# Patient Record
Sex: MALE | Race: WHITE | Employment: UNEMPLOYED | URBAN - METROPOLITAN AREA
[De-identification: names, ages, dates, MRNs, and addresses within clinical notes are randomized per-mention and may not be internally consistent; named-entity substitution may affect disease eponyms.]

---

## 2021-01-31 ENCOUNTER — HOSPITAL ENCOUNTER (EMERGENCY)
Age: 31
Discharge: HOME | End: 2021-01-31
Attending: EMERGENCY MEDICINE

## 2021-01-31 VITALS
RESPIRATION RATE: 16 BRPM | OXYGEN SATURATION: 97 % | SYSTOLIC BLOOD PRESSURE: 138 MMHG | HEART RATE: 86 BPM | TEMPERATURE: 97 F | DIASTOLIC BLOOD PRESSURE: 93 MMHG | WEIGHT: 172.1 LBS

## 2021-01-31 DIAGNOSIS — S51.812A LACERATION OF LEFT FOREARM, INITIAL ENCOUNTER: Primary | ICD-10-CM

## 2021-01-31 PROCEDURE — 12002 RPR S/N/AX/GEN/TRNK2.6-7.5CM: CPT

## 2021-01-31 PROCEDURE — 6370000000 HC RX 637 (ALT 250 FOR IP): Performed by: EMERGENCY MEDICINE

## 2021-01-31 PROCEDURE — 99284 EMERGENCY DEPT VISIT MOD MDM: CPT

## 2021-01-31 RX ORDER — IBUPROFEN 200 MG
TABLET ORAL
Qty: 1 TUBE | Refills: 1 | Status: SHIPPED | OUTPATIENT
Start: 2021-01-31

## 2021-01-31 RX ORDER — IBUPROFEN 600 MG/1
600 TABLET ORAL ONCE
Status: COMPLETED | OUTPATIENT
Start: 2021-01-31 | End: 2021-01-31

## 2021-01-31 RX ADMIN — Medication 3 ML: at 19:50

## 2021-01-31 RX ADMIN — IBUPROFEN 600 MG: 600 TABLET, FILM COATED ORAL at 19:50

## 2021-01-31 ASSESSMENT — PAIN DESCRIPTION - LOCATION
LOCATION: ARM
LOCATION: ARM

## 2021-01-31 ASSESSMENT — PAIN SCALES - GENERAL: PAINLEVEL_OUTOF10: 1

## 2021-01-31 ASSESSMENT — PAIN DESCRIPTION - ORIENTATION
ORIENTATION: LEFT;LOWER
ORIENTATION: LEFT;LOWER

## 2021-02-01 NOTE — ED TRIAGE NOTES
Nurse entered room wearing face mask, hair cover, face shield and gloves, patient also wearing face mask, encouraged repeatedly to place over mouth and nose, states, \"I can't see if it covers my nose\" encouraged patient to keep over mouth and nose

## 2021-02-01 NOTE — ED NOTES
Patient remains in pain, states, \"it doesn't hurt as much since it's numb\" EMD aware, reviewed instructions with patient, reviewed wound care, patient able to verb under, discharged home     Mali Barnes, [facility]  01/31/21 2025

## 2021-02-01 NOTE — ED NOTES
Wound cleansed, bacitracin, mepitel, 4X4's and wrapped with a benson, patient tolerated, patient tolerated procedure well      Marc Hart RN  01/31/21 2014

## 2021-02-01 NOTE — ED PROVIDER NOTES
[address] [facility]  [facility]      Pt Name: Arvin Paiz  MRN: [id number]  [place] 1990  Date of evaluation: 1/31/2021  Provider: Arsen Lan MD    CHIEF COMPLAINT       Chief Complaint   Patient presents with    Laceration     LFA, last night,\"construction in my closet\" large lac noted approximately 5 1/2 cm by 2, some gaping, N/V intact to hand, fingers, good cap refill, good radial pulse         HISTORY OF PRESENT ILLNESS   (Location/Symptom, Timing/Onset,Context/Setting, Quality, Duration, Modifying Factors, Severity)  Note limiting factors. Arvin Paiz is a 31 y.o. male who presents to the emergency department for laceration to his left forearm. The patient states that he was cut by a sheet metal almost 24 hours ago. The patient states he did not have a ride to come to the hospital earlier this he did not come in. He is complaining of pain to the area. No other injuries. He states that he has been able to move his hand and his fingers. Nursing notes were reviewed. REVIEW OF SYSTEMS    (2-9 systems for level 4, 10 or more for level 5)     Review of Systems    Positive and pertinent negative as per HPI. Except as noted above in the ROS, all other systems were reviewed and were negative. PAST MEDICAL HISTORY   History reviewed. No pertinent past medical history. SURGICALHISTORY     History reviewed. No pertinent surgical history. CURRENT MEDICATIONS       Discharge Medication List as of 1/31/2021  8:16 PM          ALLERGIES     Patient has no known allergies. FAMILY HISTORY     History reviewed. No pertinent family history.        SOCIAL HISTORY       Social History     Socioeconomic History    Marital status: Single     Spouse name: None    Number of children: None    Years of education: None    Highest education level: None   Occupational History    None   Social Needs    Financial resource strain: None   CheckInOn.Me approximately 4-1/2 cm laceration to the posterior lateral aspect of the left forearm. It is gaping. No active bleeding. No foreign body identified. I do not amended any tendon or vasculature. Patient has normal range of motion of the elbow wrist fingers. Normal sensation to soft touch. Normal capillary refill and radial pulse. Neurological:      Mental Status: He is alert. Psychiatric:         Mood and Affect: Mood normal.         Behavior: Behavior normal.             DIAGNOSTIC RESULTS     EKG: All EKG's are interpreted by the Emergency Department Physician who either signs or Co-signs this chart in the absence of a cardiologist.    12 lead EKG shows     RADIOLOGY:   Non-plain film images such as CT, Ultrasound and MRI are read by the radiologist. Plain radiographic images are visualized and preliminarily interpreted by the emergency physician with the below findings:        Interpretation per the Radiologist below, if available at the time of this note:    No orders to display         ED BEDSIDE ULTRASOUND:   Performed by ED Physician - none    LABS:  Labs Reviewed - No data to display    All other labs were within normal range or not returned as of this dictation. EMERGENCY DEPARTMENT COURSE and DIFFERENTIAL DIAGNOSIS/MDM:   Vitals:    Vitals:    01/31/21 1937   BP: (!) 138/93   Pulse: 86   Resp: 16   Temp: 97 °F (36.1 °C)   TempSrc: Infrared   SpO2: 97%   Weight: 172 lb 1.6 oz (78.1 kg)       Adult male who comes in for laceration. Has been almost 24 hours. The wound is irrigated after topical lidocaine is applied. Due to the duration time of the laceration I will not do any approximation. Bacitracin dressing is applied by nursing staff. Patient counseled to look for signs of infection and also educated on how he should take care of the wound. Referral to a PCP is given.                CRITICAL CARE TIME   None       CONSULTS:  None    PROCEDURES:  Unless otherwise noted above, none Procedures    FINAL IMPRESSION      1. Laceration of left forearm, initial encounter          DISPOSITION/PLAN   DISPOSITION Decision To Discharge 01/31/2021 08:13:50 PM      PATIENT REFERREDTO:  [facility]) Pre-Services  [phone]          DISCHARGEMEDICATIONS:  Discharge Medication List as of 1/31/2021  8:16 PM      START taking these medications    Details   neomycin-bacitracin-polymyxin (NEOSPORIN) 5-400-5000 ointment Apply topically 4 times daily. , Disp-1 Tube, R-1, Print                (Please note that portions of this note were completed with a voice recognition program.  Efforts were made to edit the dictations but occasionally words are mis-transcribed.)    Arsen Lan MD (electronically signed)  Attending Emergency Physician        Arsen Lan MD  02/01/21 4192

## 2022-10-17 ENCOUNTER — HOSPITAL ENCOUNTER (EMERGENCY)
Age: 32
Discharge: HOME | End: 2022-10-17
Payer: MEDICAID

## 2022-10-17 ENCOUNTER — APPOINTMENT (OUTPATIENT)
Dept: GENERAL RADIOLOGY | Age: 32
End: 2022-10-17
Payer: MEDICAID

## 2022-10-17 VITALS
RESPIRATION RATE: 19 BRPM | HEART RATE: 99 BPM | WEIGHT: 176 LBS | TEMPERATURE: 97.9 F | SYSTOLIC BLOOD PRESSURE: 175 MMHG | DIASTOLIC BLOOD PRESSURE: 93 MMHG | OXYGEN SATURATION: 100 %

## 2022-10-17 DIAGNOSIS — M54.50 ACUTE BILATERAL LOW BACK PAIN WITHOUT SCIATICA: Primary | ICD-10-CM

## 2022-10-17 PROCEDURE — 99284 EMERGENCY DEPT VISIT MOD MDM: CPT

## 2022-10-17 PROCEDURE — 72100 X-RAY EXAM L-S SPINE 2/3 VWS: CPT

## 2022-10-17 PROCEDURE — 72110 X-RAY EXAM L-2 SPINE 4/>VWS: CPT

## 2022-10-17 PROCEDURE — 6360000002 HC RX W HCPCS: Performed by: PHYSICIAN ASSISTANT

## 2022-10-17 PROCEDURE — 6370000000 HC RX 637 (ALT 250 FOR IP): Performed by: PHYSICIAN ASSISTANT

## 2022-10-17 PROCEDURE — 96372 THER/PROPH/DIAG INJ SC/IM: CPT

## 2022-10-17 RX ORDER — NAPROXEN 500 MG/1
500 TABLET ORAL 2 TIMES DAILY WITH MEALS
Qty: 30 TABLET | Refills: 0 | Status: SHIPPED | OUTPATIENT
Start: 2022-10-17

## 2022-10-17 RX ORDER — ORPHENADRINE CITRATE 30 MG/ML
60 INJECTION INTRAMUSCULAR; INTRAVENOUS ONCE
Status: COMPLETED | OUTPATIENT
Start: 2022-10-17 | End: 2022-10-17

## 2022-10-17 RX ORDER — CYCLOBENZAPRINE HCL 10 MG
10 TABLET ORAL 3 TIMES DAILY PRN
Qty: 20 TABLET | Refills: 0 | Status: SHIPPED | OUTPATIENT
Start: 2022-10-17 | End: 2022-10-24

## 2022-10-17 RX ORDER — ONDANSETRON 4 MG/1
4 TABLET, ORALLY DISINTEGRATING ORAL ONCE
Status: COMPLETED | OUTPATIENT
Start: 2022-10-17 | End: 2022-10-17

## 2022-10-17 RX ORDER — LIDOCAINE 4 G/G
1 PATCH TOPICAL ONCE
Status: DISCONTINUED | OUTPATIENT
Start: 2022-10-17 | End: 2022-10-17 | Stop reason: HOSPADM

## 2022-10-17 RX ORDER — LIDOCAINE 50 MG/G
1 PATCH TOPICAL DAILY
Qty: 30 PATCH | Refills: 0 | Status: SHIPPED | OUTPATIENT
Start: 2022-10-17

## 2022-10-17 RX ORDER — KETOROLAC TROMETHAMINE 30 MG/ML
30 INJECTION, SOLUTION INTRAMUSCULAR; INTRAVENOUS ONCE
Status: COMPLETED | OUTPATIENT
Start: 2022-10-17 | End: 2022-10-17

## 2022-10-17 RX ORDER — HYDROCODONE BITARTRATE AND ACETAMINOPHEN 5; 325 MG/1; MG/1
1 TABLET ORAL ONCE
Status: COMPLETED | OUTPATIENT
Start: 2022-10-17 | End: 2022-10-17

## 2022-10-17 RX ADMIN — ONDANSETRON 4 MG: 4 TABLET, ORALLY DISINTEGRATING ORAL at 11:47

## 2022-10-17 RX ADMIN — HYDROCODONE BITARTRATE AND ACETAMINOPHEN 1 TABLET: 5; 325 TABLET ORAL at 11:47

## 2022-10-17 RX ADMIN — KETOROLAC TROMETHAMINE 30 MG: 30 INJECTION, SOLUTION INTRAMUSCULAR at 11:47

## 2022-10-17 RX ADMIN — ORPHENADRINE CITRATE 60 MG: 30 INJECTION INTRAMUSCULAR; INTRAVENOUS at 11:48

## 2022-10-17 ASSESSMENT — ENCOUNTER SYMPTOMS
CHEST TIGHTNESS: 0
COUGH: 0
RESPIRATORY NEGATIVE: 1
ABDOMINAL PAIN: 0
CONSTIPATION: 0
SHORTNESS OF BREATH: 0
COLOR CHANGE: 0
VOMITING: 0
DIARRHEA: 0
BACK PAIN: 1
NAUSEA: 0

## 2022-10-17 ASSESSMENT — PAIN - FUNCTIONAL ASSESSMENT: PAIN_FUNCTIONAL_ASSESSMENT: 0-10

## 2022-10-17 ASSESSMENT — PAIN SCALES - GENERAL: PAINLEVEL_OUTOF10: 8

## 2022-10-17 NOTE — ED PROVIDER NOTES
[facility]        Pt Name: Lew Amin  MRN: [id number]  [place] 1990  Date of evaluation: 10/17/2022  Provider: ELVIA Miller  PCP: No primary care provider on file. Note Started: 2:21 PM EDT       ELICIA. I have evaluated this patient. My supervising physician was available for consultation. CHIEF COMPLAINT       Chief Complaint   Patient presents with    Back Pain     Pt reports moving last night and now has pain to lower left back        HISTORY OF PRESENT ILLNESS   (Location, Timing/Onset, Context/Setting, Quality, Duration, Modifying Factors, Severity, Associated Signs and Symptoms)  Note limiting factors. Chief Complaint: Back Pain    Lew Amin is a 29 y.o. male with no significant past medical history who presents to the ED with complaint of some back pain. Patient states last night he was helping caring someone in a wheelchair. Patient that he felt pop in his back. States since then he has had pain to his lower back. He denies any radiation down the legs. Denies significant history of back problems in the past.  He states he is concerned that he has a bulging disc/pinched nerve. States pain is aching rated 8/10. He denies bowel/bladder incontinence, urinary tension, saddle anesthesia or distal numbness/tingling. He has been able to ambulate this morning with associated pain. States pain is worsened with movement and palpation. He did take Flexeril and ibuprofen last night with minimal improvement of symptoms. Denies fever or chills. Denies rashes or lesions. Denies chest pain, shortness of breath, abdominal pain, nausea/vomiting, urinary symptoms or changes in bowel movements. Became concerned and came to the ED for further evaluation and treatment. Nursing Notes were all reviewed and agreed with or any disagreements were addressed in the HPI.     REVIEW OF SYSTEMS    (2-9 systems for level 4, 10 or more for level 5)     Review of Systems   Constitutional:  Positive for activity change. Negative for appetite change, chills, diaphoresis, fatigue and fever. Respiratory: Negative. Negative for cough, chest tightness and shortness of breath. Cardiovascular: Negative. Negative for chest pain, palpitations and leg swelling. Gastrointestinal:  Negative for abdominal pain, constipation, diarrhea, nausea and vomiting. Genitourinary:  Negative for decreased urine volume, difficulty urinating, dysuria, flank pain, frequency, hematuria and urgency. Musculoskeletal:  Positive for arthralgias, back pain, gait problem and myalgias. Negative for joint swelling, neck pain and neck stiffness. Skin:  Negative for color change, pallor, rash and wound. Neurological:  Negative for dizziness, weakness, light-headedness, numbness and headaches. Positives and Pertinent negatives as per HPI. Except as noted above in the ROS, all other systems were reviewed and negative. PAST MEDICAL HISTORY   History reviewed. No pertinent past medical history. SURGICAL HISTORY   History reviewed. No pertinent surgical history. [address]       Discharge Medication List as of 10/17/2022  1:06 PM        CONTINUE these medications which have NOT CHANGED    Details   neomycin-bacitracin-polymyxin (NEOSPORIN) 5-400-5000 ointment Apply topically 4 times daily. , Disp-1 Tube, R-1, Print               ALLERGIES     Patient has no known allergies. FAMILYHISTORY     History reviewed. No pertinent family history.        SOCIAL HISTORY       Social History     Tobacco Use    Smoking status: Every Day     Packs/day: 0.50     Types: Cigarettes    Smokeless tobacco: Never   Substance Use Topics    Alcohol use: Never    Drug use: Yes     Types: Marijuana (Weed)       SCREENINGS    Johnson Coma Scale  Eye Opening: Spontaneous  Best Verbal Response: Oriented  Best Motor Response: Obeys commands  Mk Coma Scale Score: 15        PHYSICAL EXAM    (up to 7 for level 4, 8 or more for level 5)     ED Triage Vitals [10/17/22 1118]   BP Temp Temp src Heart Rate Resp SpO2 Height Weight   (!) 175/93 97.9 °F (36.6 °C) -- 99 19 100 % -- 176 lb (79.8 kg)       Physical Exam  Constitutional:       General: He is not in acute distress. Appearance: Normal appearance. He is well-developed. He is not ill-appearing, toxic-appearing or diaphoretic. HENT:      Head: Normocephalic and atraumatic. Right Ear: External ear normal.      Left Ear: External ear normal.   Eyes:      General:         Right eye: No discharge. Left eye: No discharge. Conjunctiva/sclera: Conjunctivae normal.   Cardiovascular:      Rate and Rhythm: Normal rate and regular rhythm. Pulses: Normal pulses. Heart sounds: Normal heart sounds. No murmur heard. No friction rub. No gallop. Pulmonary:      Effort: Pulmonary effort is normal. No respiratory distress. Breath sounds: Normal breath sounds. No stridor. No wheezing, rhonchi or rales. Chest:      Chest wall: No tenderness. Abdominal:      General: Abdomen is flat. There is no distension. Palpations: Abdomen is soft. There is no mass. Tenderness: There is no abdominal tenderness. There is no right CVA tenderness, left CVA tenderness, guarding or rebound. Hernia: No hernia is present. Musculoskeletal:         General: Normal range of motion. Cervical back: Normal range of motion and neck supple. Comments: Upon examination patient is tender ovation over the midline lumbar spine associated lumbar paraspinal musculature bilaterally. Pain is reproducible with flexion extension of the lumbar spine. He has pain with sitting up from a seated position. He is able to ambulate here in the ED unassisted. Able to stand on tippy toes and heels without difficulty. There is positive straight leg raise bilaterally.   There is full range of motion strength of bilateral hips, knees and ankles. Full plantar flexion dorsiflexion. Sensation intact light touch. Skin:     General: Skin is warm and dry. Coloration: Skin is not pale. Findings: No erythema or rash. Neurological:      Mental Status: He is alert and oriented to person, place, and time. Sensory: Sensation is intact. No sensory deficit. Motor: Motor function is intact. No weakness. Coordination: Coordination is intact. Gait: Gait is intact. Gait normal.   Psychiatric:         Behavior: Behavior normal.       DIAGNOSTIC RESULTS   LABS:    Labs Reviewed - No data to display    When ordered only abnormal lab results are displayed. All other labs were within normal range or not returned as of this dictation. EKG: When ordered, EKG's are interpreted by the Emergency Department Physician in the absence of a cardiologist.  Please see their note for interpretation of EKG. RADIOLOGY:   Non-plain film images such as CT, Ultrasound and MRI are read by the radiologist. Plain radiographic images are visualized and preliminarily interpreted by the ED Provider with the below findings:        Interpretation per the Radiologist below, if available at the time of this note:    XR LUMBAR SPINE (2-3 VIEWS)   Final Result   No acute osseous abnormality identified. XR LUMBAR SPINE (2-3 VIEWS)    Result Date: 10/17/2022  EXAMINATION: 3 XRAY VIEWS OF THE LUMBAR SPINE 10/17/2022 12:00 pm COMPARISON: None. HISTORY: ORDERING SYSTEM PROVIDED HISTORY: back pain TECHNOLOGIST PROVIDED HISTORY: Reason for exam:->back pain Reason for Exam: Back Pain (Pt reports moving last night and now has pain to lower left back ) FINDINGS: 5 lumbar-type vertebral bodies are present. No evidence of acute fracture. Overall alignment is preserved. A nonobstructive bowel gas pattern is present. No radiopaque foreign bodies. No acute osseous abnormality identified.            PROCEDURES   Unless otherwise noted below, none     Procedures    CRITICAL CARE TIME       CONSULTS:  None      EMERGENCY DEPARTMENT COURSE and DIFFERENTIAL DIAGNOSIS/MDM:   Vitals:    Vitals:    10/17/22 1118   BP: (!) 175/93   Pulse: 99   Resp: 19   Temp: 97.9 °F (36.6 °C)   SpO2: 100%   Weight: 176 lb (79.8 kg)       Patient was given the following medications:  Medications   lidocaine 4 % external patch 1 patch (1 patch TransDERmal Patch Applied 10/17/22 1151)   orphenadrine (NORFLEX) injection 60 mg (60 mg IntraMUSCular Given 10/17/22 1148)   ketorolac (TORADOL) injection 30 mg (30 mg IntraMUSCular Given 10/17/22 1147)   HYDROcodone-acetaminophen (NORCO) 5-325 MG per tablet 1 tablet (1 tablet Oral Given 10/17/22 1147)   ondansetron (ZOFRAN-ODT) disintegrating tablet 4 mg (4 mg Oral Given 10/17/22 1147)         Is this patient to be included in the SEP-1 Core Measure due to severe sepsis or septic shock? No   Exclusion criteria - the patient is NOT to be included for SEP-1 Core Measure due to: Infection is not suspected    Patient is a 72-year-old male who presents to the ED with complaint of low back pain. Patient complaint of low back pain ever since he lifted somewhat up yesterday and felt a pop in his back. He states pain is worsened with movement and palpation. He has reassuring neurologic examination with no red flag symptoms. X-ray obtained and showed no acute abnormality. He was given lidocaine patch, Norflex, Toradol and Norco here in the ED. Upon repeat evaluation he states he is feeling slightly better but still having pain. He is able to ambulate without assistance. Believe can be safely discharged home with close outpatient follow-up.   Likely some from lower back pain which I believe most likely musculoskeletal.  Low sufficient for acute fracture, dislocation, septic arthritis, gout, cauda equina, epidural abscess, epidural hematoma, spinal stenosis, significant cord compression, AAA, dissection, retroperitoneal bleed, pyelonephritis, nephrolithiasis, surgical abdomen or other emergent etiology at this time. Patient be discharged home with lidocaine patches, Flexeril and Naprosyn. Referral to [address] [place] [address] back and spine. FINAL IMPRESSION      1.  Acute bilateral low back pain without sciatica          DISPOSITION/PLAN   DISPOSITION Decision To Discharge 10/17/2022 01:04:09 PM      PATIENT REFERRED TO:  [facility] Emergency Department  [address] [facility]  [phone]  Go to   As needed, If symptoms worsen      DISCHARGE MEDICATIONS:  Discharge Medication List as of 10/17/2022  1:06 PM        START taking these medications    Details   lidocaine (LIDODERM) 5 % Place 1 patch onto the skin daily 12 hours on, 12 hours off., Disp-30 patch, R-0Print      cyclobenzaprine (FLEXERIL) 10 MG tablet Take 1 tablet by mouth 3 times daily as needed for Muscle spasms, Disp-20 tablet, R-0Print      naproxen (NAPROSYN) 500 MG tablet Take 1 tablet by mouth 2 times daily (with meals), Disp-30 tablet, R-0Print             DISCONTINUED MEDICATIONS:  Discharge Medication List as of 10/17/2022  1:06 PM                 (Please note that portions of this note were completed with a voice recognition program.  Efforts were made to edit the dictations but occasionally words are mis-transcribed.)    ELVIA Brock (electronically signed)           Bubba Jeans, PA  10/17/22 5380

## 2022-10-19 ENCOUNTER — OFFICE VISIT (OUTPATIENT)
Dept: ORTHOPEDIC SURGERY | Age: 32
End: 2022-10-19

## 2022-10-19 ENCOUNTER — TELEPHONE (OUTPATIENT)
Dept: ORTHOPEDIC SURGERY | Age: 32
End: 2022-10-19

## 2022-10-19 VITALS — HEIGHT: 71 IN | BODY MASS INDEX: 24.63 KG/M2 | WEIGHT: 175.93 LBS

## 2022-10-19 DIAGNOSIS — M54.42 ACUTE MIDLINE LOW BACK PAIN WITH BILATERAL SCIATICA: ICD-10-CM

## 2022-10-19 DIAGNOSIS — M51.26 DISCOGENIC SYNDROME, LUMBAR: ICD-10-CM

## 2022-10-19 DIAGNOSIS — F19.11 HX OF SUBSTANCE ABUSE (HCC): ICD-10-CM

## 2022-10-19 DIAGNOSIS — M54.41 ACUTE MIDLINE LOW BACK PAIN WITH BILATERAL SCIATICA: ICD-10-CM

## 2022-10-19 PROCEDURE — L0627 LO SAG RI AN/POS PNL PRE CST: HCPCS | Performed by: INTERNAL MEDICINE

## 2022-10-19 PROCEDURE — 99204 OFFICE O/P NEW MOD 45 MIN: CPT | Performed by: INTERNAL MEDICINE

## 2022-10-19 RX ORDER — TRAMADOL HYDROCHLORIDE 50 MG/1
50 TABLET ORAL EVERY 6 HOURS PRN
Qty: 20 TABLET | Refills: 0 | Status: SHIPPED | OUTPATIENT
Start: 2022-10-19 | End: 2022-10-24

## 2022-10-19 RX ORDER — PREDNISONE 50 MG/1
50 TABLET ORAL DAILY
Qty: 6 TABLET | Refills: 0 | Status: SHIPPED | OUTPATIENT
Start: 2022-10-19 | End: 2022-10-25

## 2022-10-19 NOTE — TELEPHONE ENCOUNTER
Orders and auth were faxed to [facility] and received confirmation.   [employer] will contact pt to schedule

## 2022-10-19 NOTE — PROGRESS NOTES
Chief Complaint:   Chief Complaint   Patient presents with    Lower Back Pain     Lumbar pain that started on Omid 10/16/2022, was pulling a pt up stairs that was in a wheelchair. Once inside the house he felt severe pain in his low back and by the time he went to sit down he had severe numbness in both of his feet and his L leg had radiating burning and numbness down to his knee. History of Present Illness:       Patient is a 29 y.o. male presents with the above complaint. The symptoms began 3 daysago and started without an injury wherein he was pulling a wheelchair patient up for the steps of a porch. The pain intensified thereafter prompting evaluation in the emergency room setting he was treated and released and presents here for further evaluation. . The patient describes a aching, burning pain that does radiate. The symptoms are constant  and are are worsening since the onset. The symptoms of back pain do show a discogenic provacative pattern and is  improved with  line . There is not new onset weakness or progressive weakness of the lower extremities that has developed. The patient denies new onset bowel or bladder dysfunction. There is no history of previous spinal trauma. The patient does not have history or orthopaedic lumbar spine surgery. Pain localizes to the lumbar region    Pain levels: 9    There is lower limb pain . Work-up to date has included: X-ray  Prior treatment has included NSAID-Toradol followed by Naprosyn, muscle relaxant-Flexeril. This patient reports little improvement with this treatment. The patient has no history or autoimmune disease, inflammatory arthropathy or crystal arthropathy. Past Medical History:      No past medical history on file. No past surgical history on file.       Present Medications:         Current Outpatient Medications   Medication Sig Dispense Refill    predniSONE (DELTASONE) 50 MG tablet Take 1 tablet by mouth daily for 6 days 6 tablet 0    traMADol (ULTRAM) 50 MG tablet Take 1 tablet by mouth every 6 hours as needed for Pain for up to 5 days. 20 tablet 0    lidocaine (LIDODERM) 5 % Place 1 patch onto the skin daily 12 hours on, 12 hours off. 30 patch 0    cyclobenzaprine (FLEXERIL) 10 MG tablet Take 1 tablet by mouth 3 times daily as needed for Muscle spasms 20 tablet 0    naproxen (NAPROSYN) 500 MG tablet Take 1 tablet by mouth 2 times daily (with meals) 30 tablet 0    neomycin-bacitracin-polymyxin (NEOSPORIN) 5-400-5000 ointment Apply topically 4 times daily. 1 Tube 1     No current facility-administered medications for this visit. Allergies:      No Known Allergies     Social History:         Social History     Socioeconomic History    Marital status: Single     Spouse name: Not on file    Number of children: Not on file    Years of education: Not on file    Highest education level: Not on file   Occupational History    Not on file   Tobacco Use    Smoking status: Every Day     Packs/day: 0.50     Types: Cigarettes    Smokeless tobacco: Never   Substance and Sexual Activity    Alcohol use: Never    Drug use: Yes     Types: Marijuana Rafi Shoemaker)    Sexual activity: Not on file   Other Topics Concern    Not on file   Social History Narrative    Not on file     Social Determinants of Health     Financial Resource Strain: Not on file   Food Insecurity: Not on file   Transportation Needs: Not on file   Physical Activity: Not on file   Stress: Not on file   Social Connections: Not on file   Intimate Partner Violence: Not on file   Housing Stability: Not on file        Review of Symptoms:    Pertinent items are noted in HPI    Review of systems reviewed from Patient History Form dated on today's date and   available in the patient's chart under the Media tab. Vital Signs: There were no vitals filed for this visit.      General Exam:     Constitutional: Patient is adequately groomed with no evidence of malnutrition  Mental Status: The patient is oriented to time, place and person. The patient's mood and affect are appropriate. Vascular: Examination reveals no swelling or calf tenderness. Peripheral pulses are palpable and 2+. Lymphatics: no lymphadenopathy of the inguinal region or lower extremity      Physical Exam: lower back      Primary Exam:    Inspection: No deformity atrophy appreciable curvature      Palpation: No focal trigger point tenderness      Range of Motion: 15/0 pain with extension greater than flexion      Strength: Normal lower extremity      Special Tests: SLR positive on the right greater than left      Skin: There are no rashes, ulcerations or lesions. Gait: Minimally antalgic      Reflex intact lower     Additional Comments:        Additional Examinations:           Neurologic -Light touch sensation and manual muscle testing is normal C5-C8 . Biceps and triceps reflexes are symmetric and +2. Spurlling sign is negative            Office Imaging Results/Procedures PerformedToday:          Office Procedures:     Orders Placed This Encounter   Procedures    MRI LUMBAR SPINE WO CONTRAST     Standing Status:   Future     Standing Expiration Date:   10/19/2023     Scheduling Instructions:      Danis Cline, please contact pt to schedule, Darrell Abraham will obtain auth and fwd to your facility. Pt advised to f/u in clinic 2-3 days after MRI for results. Order Specific Question:   Reason for exam:     Answer:   R/O HNP / STENOSIS     Order Specific Question:   What is the sedation requirement? Answer:   None    Breg Quick Draw Lumbar Brace     Patient was prescribed a Breg Quick Draw Lumbar Brace. The lumbar spine will require stabilization / immobilization from this semi-rigid / rigid orthosis to improve their function. The orthosis will assist in protecting the affected area, provide functional support and facilitate healing.  This orthosis is required for the following reasons:    Reduce pain by restricting mobility of the trunk    The patient was educated and fit by a healthcare professional with expert knowledge and specialization in brace application while under the direct supervision of the physician. Verbal and written instructions for the use of and application of this item were provided. They were instructed to contact the office immediately should the brace result in increased pain, decreased sensation, increased swelling or worsening of the condition. Other Outside Imaging and Testing Personally Reviewed:    XR LUMBAR SPINE (2-3 VIEWS)    Result Date: 10/17/2022  EXAMINATION: 3 XRAY VIEWS OF THE LUMBAR SPINE 10/17/2022 12:00 pm COMPARISON: None. HISTORY: ORDERING SYSTEM PROVIDED HISTORY: back pain TECHNOLOGIST PROVIDED HISTORY: Reason for exam:->back pain Reason for Exam: Back Pain (Pt reports moving last night and now has pain to lower left back ) FINDINGS: 5 lumbar-type vertebral bodies are present. No evidence of acute fracture. Overall alignment is preserved. A nonobstructive bowel gas pattern is present. No radiopaque foreign bodies. No acute osseous abnormality identified. Assessment   Impression: . Encounter Diagnoses   Name Primary?     Discogenic syndrome, lumbar     Hx of substance abuse (HCC)     Acute midline low back pain with bilateral sciatica               Plan:     MRI lumbar spine evaluate severity of discopathy/ stenosis suspected clinically  Consider Him a candidate for spine intervention injection  Activity modification, lumbar spine precautions  lumbar spinestabilization home exercise program  Medical pain management: High-dose steroids-prednisone followed by Naprosyn as needed use of Ultram short-term minimize use of narcotics and caution relative to history of substance abuse in remission  ORT on follow-up    There is a high clinical suspicion of acute disc herniation and given his current level of pain and disability and history of substance abuse, opiate sparing treatment options such as L BOO will be prudent in managing his acute injury. Proceed as outlined above    Approximately 45 minutes was spent related to previewing pertinent medical documentation prior to the patient's visit along with counseling during the patient's visit with respect to treatment options inclusive of alternatives to treatment and the complications and risks related to those treatment options along with expectations of outcome related to those treatments and inclusive of time in the documentation and ordering of testing and treatment after the visit. The nature of the finding, probable diagnosis and likely treatment was thoroughly discussed with the patient. The options, risks, complications, alternative treatment as well as some of the differential diagnosis was discussed. The patient was thoroughly informed and all questions were answered. the patient indicated understanding and satisfaction with the discussion. Orders:        Orders Placed This Encounter   Procedures    MRI LUMBAR SPINE WO CONTRAST     Standing Status:   Future     Standing Expiration Date:   10/19/2023     Scheduling Instructions:      Hernán Lee, please contact pt to schedule, Darrell Abraham will obtain auth and fwd to your facility. Pt advised to f/u in clinic 2-3 days after MRI for results. Order Specific Question:   Reason for exam:     Answer:   R/O HNP / STENOSIS     Order Specific Question:   What is the sedation requirement? Answer:   None    Breg Quick Draw Lumbar Brace     Patient was prescribed a Breg Quick Draw Lumbar Brace. The lumbar spine will require stabilization / immobilization from this semi-rigid / rigid orthosis to improve their function. The orthosis will assist in protecting the affected area, provide functional support and facilitate healing.  This orthosis is required for the following reasons:    Reduce pain by restricting mobility of the trunk    The patient was educated and fit by a healthcare professional with expert knowledge and specialization in brace application while under the direct supervision of the physician. Verbal and written instructions for the use of and application of this item were provided. They were instructed to contact the office immediately should the brace result in increased pain, decreased sensation, increased swelling or worsening of the condition. Disclaimer: \"This note was dictated with voice recognition software. Though review and correction are routine, we apologize for any errors. \"

## 2022-10-19 NOTE — TELEPHONE ENCOUNTER
General Question     Subject: Diana Molina  Patient and /or Facility Request: Rut Barrett  Contact Number: [phone]    PATIENT CALLED IN TO GET AN SCHEDULE APPT FOR [facility] FOR HIS MRI TEST. Cheryl Melendez [facility] SATES THAT THEY DON'T HAVE AN COPY OF THE ORDERS YET. Cheryl Melendez PLEASE CALL PATIENT BACK AT THE ABOVE NUMBER. ..

## 2023-08-07 ENCOUNTER — OFFICE VISIT (OUTPATIENT)
Dept: URGENT CARE | Age: 33
End: 2023-08-07

## 2023-08-07 VITALS
HEART RATE: 57 BPM | OXYGEN SATURATION: 98 % | TEMPERATURE: 98 F | BODY MASS INDEX: 28 KG/M2 | WEIGHT: 200 LBS | HEIGHT: 71 IN | SYSTOLIC BLOOD PRESSURE: 144 MMHG | DIASTOLIC BLOOD PRESSURE: 88 MMHG | RESPIRATION RATE: 17 BRPM

## 2023-08-07 DIAGNOSIS — S91.311A LACERATION OF RIGHT FOOT, INITIAL ENCOUNTER: Primary | ICD-10-CM

## 2023-08-07 DIAGNOSIS — R03.0 ELEVATED BP WITHOUT DIAGNOSIS OF HYPERTENSION: ICD-10-CM

## 2023-08-07 RX ORDER — CEPHALEXIN 500 MG/1
500 CAPSULE ORAL 4 TIMES DAILY
Qty: 28 CAPSULE | Refills: 0 | Status: SHIPPED | OUTPATIENT
Start: 2023-08-07 | End: 2023-08-14

## 2023-08-07 NOTE — PATIENT INSTRUCTIONS
Complete antibiotics as prescribed. An over the counter probiotic is also recommended. TDap given today in the left deltoid  Cleanse left foot with antibacterial soap and apply antibiotic ointment 2-3 times daily  ER evaluation for any signs of infection (fever, red/warm foot or leg, pus draining from foot)  Monitor BP at home and call PCP if persistently elevated.    Establish care with PCP (referral placed)  New Prescriptions    CEPHALEXIN (KEFLEX) 500 MG CAPSULE    Take 1 capsule by mouth 4 times daily for 7 days

## 2023-08-16 ENCOUNTER — OFFICE VISIT (OUTPATIENT)
Dept: PRIMARY CARE CLINIC | Age: 33
End: 2023-08-16
Payer: COMMERCIAL

## 2023-08-16 VITALS
HEIGHT: 71 IN | TEMPERATURE: 96.7 F | BODY MASS INDEX: 28.14 KG/M2 | RESPIRATION RATE: 16 BRPM | OXYGEN SATURATION: 97 % | WEIGHT: 201 LBS | DIASTOLIC BLOOD PRESSURE: 90 MMHG | HEART RATE: 66 BPM | SYSTOLIC BLOOD PRESSURE: 122 MMHG

## 2023-08-16 DIAGNOSIS — F17.200 SMOKER: ICD-10-CM

## 2023-08-16 DIAGNOSIS — G43.019 INTRACTABLE MIGRAINE WITHOUT AURA AND WITHOUT STATUS MIGRAINOSUS: ICD-10-CM

## 2023-08-16 DIAGNOSIS — I10 ESSENTIAL HYPERTENSION: Primary | ICD-10-CM

## 2023-08-16 LAB
ALBUMIN SERPL-MCNC: 4.9 G/DL (ref 3.4–5)
ALBUMIN/GLOB SERPL: 2 {RATIO} (ref 1.1–2.2)
ALP SERPL-CCNC: 71 U/L (ref 40–129)
ALT SERPL-CCNC: 24 U/L (ref 10–40)
ANION GAP SERPL CALCULATED.3IONS-SCNC: 14 MMOL/L (ref 3–16)
AST SERPL-CCNC: 20 U/L (ref 15–37)
BASOPHILS # BLD: 0.1 K/UL (ref 0–0.2)
BASOPHILS NFR BLD: 1 %
BILIRUB SERPL-MCNC: 0.3 MG/DL (ref 0–1)
BUN SERPL-MCNC: 18 MG/DL (ref 7–20)
CALCIUM SERPL-MCNC: 9.3 MG/DL (ref 8.3–10.6)
CHLORIDE SERPL-SCNC: 102 MMOL/L (ref 99–110)
CHOLEST SERPL-MCNC: 221 MG/DL (ref 0–199)
CO2 SERPL-SCNC: 24 MMOL/L (ref 21–32)
CREAT SERPL-MCNC: 0.9 MG/DL (ref 0.9–1.3)
DEPRECATED RDW RBC AUTO: 14.6 % (ref 12.4–15.4)
EOSINOPHIL # BLD: 0.4 K/UL (ref 0–0.6)
EOSINOPHIL NFR BLD: 4.6 %
GFR SERPLBLD CREATININE-BSD FMLA CKD-EPI: >60 ML/MIN/{1.73_M2}
GLUCOSE P FAST SERPL-MCNC: 95 MG/DL (ref 70–99)
HCT VFR BLD AUTO: 47.1 % (ref 40.5–52.5)
HDLC SERPL-MCNC: 58 MG/DL (ref 40–60)
HGB BLD-MCNC: 15.9 G/DL (ref 13.5–17.5)
LDLC SERPL CALC-MCNC: 147 MG/DL
LYMPHOCYTES # BLD: 2.8 K/UL (ref 1–5.1)
LYMPHOCYTES NFR BLD: 37 %
MCH RBC QN AUTO: 30 PG (ref 26–34)
MCHC RBC AUTO-ENTMCNC: 33.8 G/DL (ref 31–36)
MCV RBC AUTO: 88.8 FL (ref 80–100)
MONOCYTES # BLD: 0.5 K/UL (ref 0–1.3)
MONOCYTES NFR BLD: 6.6 %
NEUTROPHILS # BLD: 3.9 K/UL (ref 1.7–7.7)
NEUTROPHILS NFR BLD: 50.8 %
PLATELET # BLD AUTO: 316 K/UL (ref 135–450)
PMV BLD AUTO: 8.1 FL (ref 5–10.5)
POTASSIUM SERPL-SCNC: 4.7 MMOL/L (ref 3.5–5.1)
PROT SERPL-MCNC: 7.3 G/DL (ref 6.4–8.2)
RBC # BLD AUTO: 5.31 M/UL (ref 4.2–5.9)
SODIUM SERPL-SCNC: 140 MMOL/L (ref 136–145)
TRIGL SERPL-MCNC: 81 MG/DL (ref 0–150)
VLDLC SERPL CALC-MCNC: 16 MG/DL
WBC # BLD AUTO: 7.7 K/UL (ref 4–11)

## 2023-08-16 PROCEDURE — 3080F DIAST BP >= 90 MM HG: CPT | Performed by: NURSE PRACTITIONER

## 2023-08-16 PROCEDURE — G8427 DOCREV CUR MEDS BY ELIG CLIN: HCPCS | Performed by: NURSE PRACTITIONER

## 2023-08-16 PROCEDURE — 4004F PT TOBACCO SCREEN RCVD TLK: CPT | Performed by: NURSE PRACTITIONER

## 2023-08-16 PROCEDURE — 36415 COLL VENOUS BLD VENIPUNCTURE: CPT | Performed by: NURSE PRACTITIONER

## 2023-08-16 PROCEDURE — 3074F SYST BP LT 130 MM HG: CPT | Performed by: NURSE PRACTITIONER

## 2023-08-16 PROCEDURE — 99203 OFFICE O/P NEW LOW 30 MIN: CPT | Performed by: NURSE PRACTITIONER

## 2023-08-16 PROCEDURE — G8419 CALC BMI OUT NRM PARAM NOF/U: HCPCS | Performed by: NURSE PRACTITIONER

## 2023-08-16 RX ORDER — LISINOPRIL 10 MG/1
10 TABLET ORAL DAILY
Qty: 30 TABLET | Refills: 5 | Status: SHIPPED | OUTPATIENT
Start: 2023-08-16

## 2023-08-16 RX ORDER — SUMATRIPTAN 50 MG/1
50 TABLET, FILM COATED ORAL
Qty: 1 TABLET | Refills: 0 | Status: SHIPPED | OUTPATIENT
Start: 2023-08-16 | End: 2023-08-16

## 2023-08-16 SDOH — ECONOMIC STABILITY: FOOD INSECURITY: WITHIN THE PAST 12 MONTHS, THE FOOD YOU BOUGHT JUST DIDN'T LAST AND YOU DIDN'T HAVE MONEY TO GET MORE.: NEVER TRUE

## 2023-08-16 SDOH — ECONOMIC STABILITY: HOUSING INSECURITY
IN THE LAST 12 MONTHS, WAS THERE A TIME WHEN YOU DID NOT HAVE A STEADY PLACE TO SLEEP OR SLEPT IN A SHELTER (INCLUDING NOW)?: NO

## 2023-08-16 SDOH — ECONOMIC STABILITY: FOOD INSECURITY: WITHIN THE PAST 12 MONTHS, YOU WORRIED THAT YOUR FOOD WOULD RUN OUT BEFORE YOU GOT MONEY TO BUY MORE.: NEVER TRUE

## 2023-08-16 SDOH — ECONOMIC STABILITY: INCOME INSECURITY: HOW HARD IS IT FOR YOU TO PAY FOR THE VERY BASICS LIKE FOOD, HOUSING, MEDICAL CARE, AND HEATING?: NOT HARD AT ALL

## 2023-08-16 ASSESSMENT — ENCOUNTER SYMPTOMS
VOMITING: 0
BACK PAIN: 0
NAUSEA: 0
SHORTNESS OF BREATH: 0
SORE THROAT: 0
COLOR CHANGE: 0
DIARRHEA: 0
RHINORRHEA: 1

## 2023-08-16 ASSESSMENT — PATIENT HEALTH QUESTIONNAIRE - PHQ9
1. LITTLE INTEREST OR PLEASURE IN DOING THINGS: 0
SUM OF ALL RESPONSES TO PHQ QUESTIONS 1-9: 0
2. FEELING DOWN, DEPRESSED OR HOPELESS: 0
SUM OF ALL RESPONSES TO PHQ QUESTIONS 1-9: 0
SUM OF ALL RESPONSES TO PHQ9 QUESTIONS 1 & 2: 0

## 2023-08-16 NOTE — ASSESSMENT & PLAN NOTE
begin lisinopril  Limit cigarettes  Monitor and record BP daily   Return in 4 weeks to discuss bp treatment and labs   Go to er for sob cp

## 2023-08-16 NOTE — PROGRESS NOTES
PROGRESS NOTE  Date of Service:  8/16/2023    SUBJECTIVE:  Patient ID: Giovanni Abebe is a 34 y.o. male    HPI: new patient exam  Was on on hydrochlorothiazide propanalol and lisinopril   Last time on medication one year ago   Current smoker   Last eye exam wear contacts   Soft stool   No blood in stool   Headache 2 times a week takes excedrin tylenol former imitrex and tompamax   Former neurology had scans   Some anxiety   Exercise manual labor only work   Diet all food groups   Etoh- not weekly     Past Medical History:   Diagnosis Date    Hypertension     Migraines       Past Surgical History:   Procedure Laterality Date    NOSE SURGERY        Social History     Tobacco Use    Smoking status: Every Day     Packs/day: 0.50     Types: Cigarettes    Smokeless tobacco: Never   Substance Use Topics    Alcohol use: Yes      Family History   Problem Relation Age of Onset    Depression Mother      Current Outpatient Medications on File Prior to Visit   Medication Sig Dispense Refill    lidocaine (LIDODERM) 5 % Place 1 patch onto the skin daily 12 hours on, 12 hours off. (Patient not taking: Reported on 8/7/2023) 30 patch 0    naproxen (NAPROSYN) 500 MG tablet Take 1 tablet by mouth 2 times daily (with meals) (Patient not taking: Reported on 8/7/2023) 30 tablet 0    neomycin-bacitracin-polymyxin (NEOSPORIN) 5-400-5000 ointment Apply topically 4 times daily. (Patient not taking: Reported on 8/7/2023) 1 Tube 1     No current facility-administered medications on file prior to visit. No Known Allergies     Review of Systems   Constitutional:  Negative for chills, fatigue and fever. HENT:  Positive for rhinorrhea. Negative for sore throat. Eyes:  Negative for visual disturbance. Respiratory:  Negative for shortness of breath. Cardiovascular:  Negative for chest pain. Gastrointestinal:  Negative for diarrhea, nausea and vomiting.    Genitourinary:  Negative for decreased urine volume, difficulty urinating,

## 2023-08-16 NOTE — ASSESSMENT & PLAN NOTE
take medication as needed for migraine   Seek ere help or office visit if no relief and worsening symptoms or change in cognition

## 2023-10-22 ENCOUNTER — HOSPITAL ENCOUNTER (INPATIENT)
Age: 33
LOS: 2 days | Discharge: HOME | DRG: 315 | End: 2023-10-24
Attending: EMERGENCY MEDICINE | Admitting: HOSPITALIST
Payer: COMMERCIAL

## 2023-10-22 ENCOUNTER — ANESTHESIA EVENT (OUTPATIENT)
Dept: OPERATING ROOM | Age: 33
DRG: 315 | End: 2023-10-22
Payer: COMMERCIAL

## 2023-10-22 ENCOUNTER — APPOINTMENT (OUTPATIENT)
Dept: GENERAL RADIOLOGY | Age: 33
DRG: 315 | End: 2023-10-22
Payer: COMMERCIAL

## 2023-10-22 ENCOUNTER — ANESTHESIA (OUTPATIENT)
Dept: OPERATING ROOM | Age: 33
DRG: 315 | End: 2023-10-22
Payer: COMMERCIAL

## 2023-10-22 DIAGNOSIS — S62.102B OPEN WRIST FRACTURE, LEFT, INITIAL ENCOUNTER: Primary | ICD-10-CM

## 2023-10-22 PROBLEM — S62.102A WRIST FRACTURE, CLOSED, LEFT, INITIAL ENCOUNTER: Status: ACTIVE | Noted: 2023-10-22

## 2023-10-22 LAB
ABO + RH BLD: NORMAL
ANION GAP SERPL CALCULATED.3IONS-SCNC: 22 MMOL/L (ref 3–16)
BASOPHILS # BLD: 0.2 K/UL (ref 0–0.2)
BASOPHILS NFR BLD: 1 %
BLD GP AB SCN SERPL QL: NORMAL
BUN SERPL-MCNC: 12 MG/DL (ref 7–20)
CALCIUM SERPL-MCNC: 8.9 MG/DL (ref 8.3–10.6)
CHLORIDE SERPL-SCNC: 103 MMOL/L (ref 99–110)
CO2 SERPL-SCNC: 16 MMOL/L (ref 21–32)
CREAT SERPL-MCNC: 1 MG/DL (ref 0.9–1.3)
DEPRECATED RDW RBC AUTO: 13.1 % (ref 12.4–15.4)
EOSINOPHIL # BLD: 0.2 K/UL (ref 0–0.6)
EOSINOPHIL NFR BLD: 1 %
GFR SERPLBLD CREATININE-BSD FMLA CKD-EPI: >60 ML/MIN/{1.73_M2}
GLUCOSE SERPL-MCNC: 120 MG/DL (ref 70–99)
HCT VFR BLD AUTO: 47.1 % (ref 40.5–52.5)
HGB BLD-MCNC: 16 G/DL (ref 13.5–17.5)
INR PPP: 0.92 (ref 0.84–1.16)
LYMPHOCYTES # BLD: 5.3 K/UL (ref 1–5.1)
LYMPHOCYTES NFR BLD: 33 %
MCH RBC QN AUTO: 30 PG (ref 26–34)
MCHC RBC AUTO-ENTMCNC: 34 G/DL (ref 31–36)
MCV RBC AUTO: 88 FL (ref 80–100)
MONOCYTES # BLD: 1 K/UL (ref 0–1.3)
MONOCYTES NFR BLD: 6 %
NEUTROPHILS # BLD: 9.6 K/UL (ref 1.7–7.7)
NEUTROPHILS NFR BLD: 58 %
NEUTS BAND NFR BLD MANUAL: 1 % (ref 0–7)
PATH INTERP BLD-IMP: YES
PLATELET # BLD AUTO: 411 K/UL (ref 135–450)
PMV BLD AUTO: 7.9 FL (ref 5–10.5)
POTASSIUM SERPL-SCNC: 3.9 MMOL/L (ref 3.5–5.1)
PROTHROMBIN TIME: 12.3 SEC (ref 11.5–14.8)
RBC # BLD AUTO: 5.36 M/UL (ref 4.2–5.9)
SODIUM SERPL-SCNC: 141 MMOL/L (ref 136–145)
WBC # BLD AUTO: 16.2 K/UL (ref 4–11)

## 2023-10-22 PROCEDURE — 7100000001 HC PACU RECOVERY - ADDTL 15 MIN: Performed by: ORTHOPAEDIC SURGERY

## 2023-10-22 PROCEDURE — 0PSL04Z REPOSITION LEFT ULNA WITH INTERNAL FIXATION DEVICE, OPEN APPROACH: ICD-10-PCS | Performed by: ORTHOPAEDIC SURGERY

## 2023-10-22 PROCEDURE — 86900 BLOOD TYPING SEROLOGIC ABO: CPT

## 2023-10-22 PROCEDURE — 96365 THER/PROPH/DIAG IV INF INIT: CPT

## 2023-10-22 PROCEDURE — 6360000002 HC RX W HCPCS: Performed by: ANESTHESIOLOGY

## 2023-10-22 PROCEDURE — 6360000002 HC RX W HCPCS: Performed by: ORTHOPAEDIC SURGERY

## 2023-10-22 PROCEDURE — 2720000010 HC SURG SUPPLY STERILE: Performed by: ORTHOPAEDIC SURGERY

## 2023-10-22 PROCEDURE — 3600000004 HC SURGERY LEVEL 4 BASE: Performed by: ORTHOPAEDIC SURGERY

## 2023-10-22 PROCEDURE — 96376 TX/PRO/DX INJ SAME DRUG ADON: CPT

## 2023-10-22 PROCEDURE — 85025 COMPLETE CBC W/AUTO DIFF WBC: CPT

## 2023-10-22 PROCEDURE — 2709999900 HC NON-CHARGEABLE SUPPLY: Performed by: ORTHOPAEDIC SURGERY

## 2023-10-22 PROCEDURE — 73090 X-RAY EXAM OF FOREARM: CPT

## 2023-10-22 PROCEDURE — 80048 BASIC METABOLIC PNL TOTAL CA: CPT

## 2023-10-22 PROCEDURE — 90471 IMMUNIZATION ADMIN: CPT | Performed by: EMERGENCY MEDICINE

## 2023-10-22 PROCEDURE — 73100 X-RAY EXAM OF WRIST: CPT

## 2023-10-22 PROCEDURE — 6370000000 HC RX 637 (ALT 250 FOR IP): Performed by: STUDENT IN AN ORGANIZED HEALTH CARE EDUCATION/TRAINING PROGRAM

## 2023-10-22 PROCEDURE — 2580000003 HC RX 258: Performed by: EMERGENCY MEDICINE

## 2023-10-22 PROCEDURE — 2580000003 HC RX 258: Performed by: ORTHOPAEDIC SURGERY

## 2023-10-22 PROCEDURE — 96375 TX/PRO/DX INJ NEW DRUG ADDON: CPT

## 2023-10-22 PROCEDURE — 85610 PROTHROMBIN TIME: CPT

## 2023-10-22 PROCEDURE — 3700000001 HC ADD 15 MINUTES (ANESTHESIA): Performed by: ORTHOPAEDIC SURGERY

## 2023-10-22 PROCEDURE — 99223 1ST HOSP IP/OBS HIGH 75: CPT | Performed by: ORTHOPAEDIC SURGERY

## 2023-10-22 PROCEDURE — 1200000000 HC SEMI PRIVATE

## 2023-10-22 PROCEDURE — 3600000014 HC SURGERY LEVEL 4 ADDTL 15MIN: Performed by: ORTHOPAEDIC SURGERY

## 2023-10-22 PROCEDURE — 2580000003 HC RX 258: Performed by: HOSPITALIST

## 2023-10-22 PROCEDURE — 6370000000 HC RX 637 (ALT 250 FOR IP): Performed by: ORTHOPAEDIC SURGERY

## 2023-10-22 PROCEDURE — 6360000002 HC RX W HCPCS: Performed by: EMERGENCY MEDICINE

## 2023-10-22 PROCEDURE — 6360000002 HC RX W HCPCS: Performed by: HOSPITALIST

## 2023-10-22 PROCEDURE — C1769 GUIDE WIRE: HCPCS | Performed by: ORTHOPAEDIC SURGERY

## 2023-10-22 PROCEDURE — A4217 STERILE WATER/SALINE, 500 ML: HCPCS | Performed by: ORTHOPAEDIC SURGERY

## 2023-10-22 PROCEDURE — 90715 TDAP VACCINE 7 YRS/> IM: CPT | Performed by: EMERGENCY MEDICINE

## 2023-10-22 PROCEDURE — 86850 RBC ANTIBODY SCREEN: CPT

## 2023-10-22 PROCEDURE — 2500000003 HC RX 250 WO HCPCS: Performed by: ANESTHESIOLOGY

## 2023-10-22 PROCEDURE — 99285 EMERGENCY DEPT VISIT HI MDM: CPT

## 2023-10-22 PROCEDURE — 7100000000 HC PACU RECOVERY - FIRST 15 MIN: Performed by: ORTHOPAEDIC SURGERY

## 2023-10-22 PROCEDURE — 3700000000 HC ANESTHESIA ATTENDED CARE: Performed by: ORTHOPAEDIC SURGERY

## 2023-10-22 PROCEDURE — 86901 BLOOD TYPING SEROLOGIC RH(D): CPT

## 2023-10-22 PROCEDURE — C1713 ANCHOR/SCREW BN/BN,TIS/BN: HCPCS | Performed by: ORTHOPAEDIC SURGERY

## 2023-10-22 PROCEDURE — 0PSJ04Z REPOSITION LEFT RADIUS WITH INTERNAL FIXATION DEVICE, OPEN APPROACH: ICD-10-PCS | Performed by: ORTHOPAEDIC SURGERY

## 2023-10-22 DEVICE — BONE SCREW, FULLY THREADED, T8
Type: IMPLANTABLE DEVICE | Site: ARM | Status: FUNCTIONAL
Brand: VARIAX

## 2023-10-22 DEVICE — LOCKING SCREW, FULLY THREADED,T8
Type: IMPLANTABLE DEVICE | Site: ARM | Status: FUNCTIONAL
Brand: VARIAX

## 2023-10-22 DEVICE — BIOACTIVE BONE GRAFT SUBSTITUTE, FOAM PACK; BETA-TRICALCIUM PHOSPHATE, TYPE I BOVINE COLLAGEN, AND BIOACTIVE GLASS
Type: IMPLANTABLE DEVICE | Site: ARM | Status: FUNCTIONAL
Brand: VITOSS BBTRAUMA

## 2023-10-22 DEVICE — COMPRESSION PLATE BROAD STRAIGHT
Type: IMPLANTABLE DEVICE | Site: ARM | Status: FUNCTIONAL
Brand: VARIAX

## 2023-10-22 DEVICE — BONE SCREW
Type: IMPLANTABLE DEVICE | Site: ARM | Status: FUNCTIONAL
Brand: VARIAX

## 2023-10-22 DEVICE — BROAD LOCKING PLATE, 2.7
Type: IMPLANTABLE DEVICE | Site: ARM | Status: FUNCTIONAL
Brand: VARIAX

## 2023-10-22 RX ORDER — SODIUM CHLORIDE 9 MG/ML
INJECTION, SOLUTION INTRAVENOUS CONTINUOUS
Status: DISCONTINUED | OUTPATIENT
Start: 2023-10-22 | End: 2023-10-22

## 2023-10-22 RX ORDER — BISACODYL 5 MG/1
5 TABLET, DELAYED RELEASE ORAL DAILY
Status: DISCONTINUED | OUTPATIENT
Start: 2023-10-22 | End: 2023-10-24 | Stop reason: HOSPADM

## 2023-10-22 RX ORDER — SODIUM CHLORIDE 0.9 % (FLUSH) 0.9 %
5-40 SYRINGE (ML) INJECTION EVERY 12 HOURS SCHEDULED
Status: DISCONTINUED | OUTPATIENT
Start: 2023-10-22 | End: 2023-10-24 | Stop reason: HOSPADM

## 2023-10-22 RX ORDER — OXYCODONE HYDROCHLORIDE 10 MG/1
10 TABLET ORAL EVERY 4 HOURS PRN
Status: DISCONTINUED | OUTPATIENT
Start: 2023-10-22 | End: 2023-10-24

## 2023-10-22 RX ORDER — SODIUM CHLORIDE 0.9 % (FLUSH) 0.9 %
5-40 SYRINGE (ML) INJECTION PRN
Status: DISCONTINUED | OUTPATIENT
Start: 2023-10-22 | End: 2023-10-22 | Stop reason: HOSPADM

## 2023-10-22 RX ORDER — KETOROLAC TROMETHAMINE 30 MG/ML
30 INJECTION, SOLUTION INTRAMUSCULAR; INTRAVENOUS ONCE
Status: COMPLETED | OUTPATIENT
Start: 2023-10-22 | End: 2023-10-22

## 2023-10-22 RX ORDER — ONDANSETRON 2 MG/ML
4 INJECTION INTRAMUSCULAR; INTRAVENOUS
Status: DISCONTINUED | OUTPATIENT
Start: 2023-10-22 | End: 2023-10-22 | Stop reason: HOSPADM

## 2023-10-22 RX ORDER — MORPHINE SULFATE 2 MG/ML
2 INJECTION, SOLUTION INTRAMUSCULAR; INTRAVENOUS
Status: DISCONTINUED | OUTPATIENT
Start: 2023-10-22 | End: 2023-10-22

## 2023-10-22 RX ORDER — ONDANSETRON 4 MG/1
4 TABLET, ORALLY DISINTEGRATING ORAL EVERY 8 HOURS PRN
Status: DISCONTINUED | OUTPATIENT
Start: 2023-10-22 | End: 2023-10-24 | Stop reason: HOSPADM

## 2023-10-22 RX ORDER — MORPHINE SULFATE 4 MG/ML
4 INJECTION, SOLUTION INTRAMUSCULAR; INTRAVENOUS
Status: DISCONTINUED | OUTPATIENT
Start: 2023-10-22 | End: 2023-10-22

## 2023-10-22 RX ORDER — FENTANYL CITRATE 50 UG/ML
INJECTION, SOLUTION INTRAMUSCULAR; INTRAVENOUS PRN
Status: DISCONTINUED | OUTPATIENT
Start: 2023-10-22 | End: 2023-10-22 | Stop reason: SDUPTHER

## 2023-10-22 RX ORDER — ASPIRIN 81 MG/1
81 TABLET ORAL 2 TIMES DAILY
Status: DISCONTINUED | OUTPATIENT
Start: 2023-10-22 | End: 2023-10-24 | Stop reason: HOSPADM

## 2023-10-22 RX ORDER — MORPHINE SULFATE 10 MG/ML
10 INJECTION, SOLUTION INTRAMUSCULAR; INTRAVENOUS ONCE
Status: COMPLETED | OUTPATIENT
Start: 2023-10-22 | End: 2023-10-22

## 2023-10-22 RX ORDER — DEXAMETHASONE SODIUM PHOSPHATE 4 MG/ML
INJECTION, SOLUTION INTRA-ARTICULAR; INTRALESIONAL; INTRAMUSCULAR; INTRAVENOUS; SOFT TISSUE PRN
Status: DISCONTINUED | OUTPATIENT
Start: 2023-10-22 | End: 2023-10-22 | Stop reason: SDUPTHER

## 2023-10-22 RX ORDER — SODIUM CHLORIDE 0.9 % (FLUSH) 0.9 %
5-40 SYRINGE (ML) INJECTION PRN
Status: DISCONTINUED | OUTPATIENT
Start: 2023-10-22 | End: 2023-10-22

## 2023-10-22 RX ORDER — SODIUM CHLORIDE 9 MG/ML
INJECTION, SOLUTION INTRAVENOUS PRN
Status: DISCONTINUED | OUTPATIENT
Start: 2023-10-22 | End: 2023-10-22 | Stop reason: HOSPADM

## 2023-10-22 RX ORDER — ONDANSETRON 2 MG/ML
4 INJECTION INTRAMUSCULAR; INTRAVENOUS EVERY 6 HOURS PRN
Status: DISCONTINUED | OUTPATIENT
Start: 2023-10-22 | End: 2023-10-22

## 2023-10-22 RX ORDER — SENNA AND DOCUSATE SODIUM 50; 8.6 MG/1; MG/1
1 TABLET, FILM COATED ORAL 2 TIMES DAILY
Status: DISCONTINUED | OUTPATIENT
Start: 2023-10-22 | End: 2023-10-24 | Stop reason: HOSPADM

## 2023-10-22 RX ORDER — SODIUM CHLORIDE 0.9 % (FLUSH) 0.9 %
5-40 SYRINGE (ML) INJECTION PRN
Status: DISCONTINUED | OUTPATIENT
Start: 2023-10-22 | End: 2023-10-24 | Stop reason: HOSPADM

## 2023-10-22 RX ORDER — OXYCODONE HYDROCHLORIDE 5 MG/1
5 TABLET ORAL PRN
Status: DISCONTINUED | OUTPATIENT
Start: 2023-10-22 | End: 2023-10-22 | Stop reason: HOSPADM

## 2023-10-22 RX ORDER — PROPOFOL 10 MG/ML
INJECTION, EMULSION INTRAVENOUS PRN
Status: DISCONTINUED | OUTPATIENT
Start: 2023-10-22 | End: 2023-10-22 | Stop reason: SDUPTHER

## 2023-10-22 RX ORDER — ACETAMINOPHEN 325 MG/1
650 TABLET ORAL EVERY 4 HOURS PRN
Status: DISCONTINUED | OUTPATIENT
Start: 2023-10-22 | End: 2023-10-22

## 2023-10-22 RX ORDER — BUPIVACAINE HYDROCHLORIDE 5 MG/ML
INJECTION, SOLUTION EPIDURAL; INTRACAUDAL
Status: COMPLETED | OUTPATIENT
Start: 2023-10-22 | End: 2023-10-22

## 2023-10-22 RX ORDER — POLYETHYLENE GLYCOL 3350 17 G/17G
17 POWDER, FOR SOLUTION ORAL DAILY
Status: DISCONTINUED | OUTPATIENT
Start: 2023-10-22 | End: 2023-10-24 | Stop reason: HOSPADM

## 2023-10-22 RX ORDER — SUMATRIPTAN 50 MG/1
50 TABLET, FILM COATED ORAL
Status: ACTIVE | OUTPATIENT
Start: 2023-10-22 | End: 2023-10-23

## 2023-10-22 RX ORDER — SODIUM CHLORIDE 0.9 % (FLUSH) 0.9 %
5-40 SYRINGE (ML) INJECTION EVERY 12 HOURS SCHEDULED
Status: DISCONTINUED | OUTPATIENT
Start: 2023-10-22 | End: 2023-10-22 | Stop reason: HOSPADM

## 2023-10-22 RX ORDER — OXYCODONE HYDROCHLORIDE 10 MG/1
10 TABLET ORAL PRN
Status: DISCONTINUED | OUTPATIENT
Start: 2023-10-22 | End: 2023-10-22 | Stop reason: HOSPADM

## 2023-10-22 RX ORDER — ONDANSETRON 2 MG/ML
4 INJECTION INTRAMUSCULAR; INTRAVENOUS EVERY 6 HOURS PRN
Status: DISCONTINUED | OUTPATIENT
Start: 2023-10-22 | End: 2023-10-24 | Stop reason: HOSPADM

## 2023-10-22 RX ORDER — KETOROLAC TROMETHAMINE 30 MG/ML
30 INJECTION, SOLUTION INTRAMUSCULAR; INTRAVENOUS EVERY 6 HOURS PRN
Status: DISCONTINUED | OUTPATIENT
Start: 2023-10-22 | End: 2023-10-22

## 2023-10-22 RX ORDER — NICOTINE 21 MG/24HR
1 PATCH, TRANSDERMAL 24 HOURS TRANSDERMAL DAILY
Status: DISCONTINUED | OUTPATIENT
Start: 2023-10-22 | End: 2023-10-24 | Stop reason: HOSPADM

## 2023-10-22 RX ORDER — ROCURONIUM BROMIDE 10 MG/ML
INJECTION, SOLUTION INTRAVENOUS PRN
Status: DISCONTINUED | OUTPATIENT
Start: 2023-10-22 | End: 2023-10-22 | Stop reason: SDUPTHER

## 2023-10-22 RX ORDER — POLYETHYLENE GLYCOL 3350 17 G/17G
17 POWDER, FOR SOLUTION ORAL DAILY PRN
Status: DISCONTINUED | OUTPATIENT
Start: 2023-10-22 | End: 2023-10-22

## 2023-10-22 RX ORDER — MIDAZOLAM HYDROCHLORIDE 1 MG/ML
INJECTION INTRAMUSCULAR; INTRAVENOUS PRN
Status: DISCONTINUED | OUTPATIENT
Start: 2023-10-22 | End: 2023-10-22 | Stop reason: SDUPTHER

## 2023-10-22 RX ORDER — ONDANSETRON 4 MG/1
4 TABLET, ORALLY DISINTEGRATING ORAL EVERY 8 HOURS PRN
Status: DISCONTINUED | OUTPATIENT
Start: 2023-10-22 | End: 2023-10-22

## 2023-10-22 RX ORDER — CEFAZOLIN SODIUM 1 G/3ML
INJECTION, POWDER, FOR SOLUTION INTRAMUSCULAR; INTRAVENOUS PRN
Status: DISCONTINUED | OUTPATIENT
Start: 2023-10-22 | End: 2023-10-22 | Stop reason: SDUPTHER

## 2023-10-22 RX ORDER — ACETAMINOPHEN 325 MG/1
650 TABLET ORAL EVERY 6 HOURS
Status: DISCONTINUED | OUTPATIENT
Start: 2023-10-22 | End: 2023-10-22 | Stop reason: SDUPTHER

## 2023-10-22 RX ORDER — MORPHINE SULFATE 4 MG/ML
4 INJECTION, SOLUTION INTRAMUSCULAR; INTRAVENOUS ONCE
Status: COMPLETED | OUTPATIENT
Start: 2023-10-22 | End: 2023-10-22

## 2023-10-22 RX ORDER — KETOROLAC TROMETHAMINE 30 MG/ML
30 INJECTION, SOLUTION INTRAMUSCULAR; INTRAVENOUS EVERY 6 HOURS
Status: DISCONTINUED | OUTPATIENT
Start: 2023-10-22 | End: 2023-10-24 | Stop reason: HOSPADM

## 2023-10-22 RX ORDER — ONDANSETRON 2 MG/ML
INJECTION INTRAMUSCULAR; INTRAVENOUS PRN
Status: DISCONTINUED | OUTPATIENT
Start: 2023-10-22 | End: 2023-10-22 | Stop reason: SDUPTHER

## 2023-10-22 RX ORDER — SODIUM CHLORIDE 9 MG/ML
INJECTION, SOLUTION INTRAVENOUS CONTINUOUS
Status: ACTIVE | OUTPATIENT
Start: 2023-10-22 | End: 2023-10-23

## 2023-10-22 RX ORDER — SODIUM CHLORIDE 9 MG/ML
INJECTION, SOLUTION INTRAVENOUS PRN
Status: DISCONTINUED | OUTPATIENT
Start: 2023-10-22 | End: 2023-10-22

## 2023-10-22 RX ORDER — LIDOCAINE HYDROCHLORIDE 10 MG/ML
INJECTION, SOLUTION EPIDURAL; INFILTRATION; INTRACAUDAL; PERINEURAL PRN
Status: DISCONTINUED | OUTPATIENT
Start: 2023-10-22 | End: 2023-10-22 | Stop reason: SDUPTHER

## 2023-10-22 RX ORDER — ACETAMINOPHEN 325 MG/1
650 TABLET ORAL EVERY 6 HOURS
Status: DISCONTINUED | OUTPATIENT
Start: 2023-10-22 | End: 2023-10-24 | Stop reason: HOSPADM

## 2023-10-22 RX ORDER — FAMOTIDINE 20 MG/1
20 TABLET, FILM COATED ORAL 2 TIMES DAILY
Status: DISCONTINUED | OUTPATIENT
Start: 2023-10-22 | End: 2023-10-24 | Stop reason: HOSPADM

## 2023-10-22 RX ORDER — SODIUM CHLORIDE 0.9 % (FLUSH) 0.9 %
5-40 SYRINGE (ML) INJECTION EVERY 12 HOURS SCHEDULED
Status: DISCONTINUED | OUTPATIENT
Start: 2023-10-22 | End: 2023-10-22

## 2023-10-22 RX ORDER — MAGNESIUM HYDROXIDE 1200 MG/15ML
LIQUID ORAL CONTINUOUS PRN
Status: COMPLETED | OUTPATIENT
Start: 2023-10-22 | End: 2023-10-22

## 2023-10-22 RX ORDER — MAGNESIUM HYDROXIDE/ALUMINUM HYDROXICE/SIMETHICONE 120; 1200; 1200 MG/30ML; MG/30ML; MG/30ML
15 SUSPENSION ORAL EVERY 6 HOURS PRN
Status: DISCONTINUED | OUTPATIENT
Start: 2023-10-22 | End: 2023-10-24 | Stop reason: HOSPADM

## 2023-10-22 RX ORDER — SODIUM CHLORIDE 9 MG/ML
INJECTION, SOLUTION INTRAVENOUS PRN
Status: DISCONTINUED | OUTPATIENT
Start: 2023-10-22 | End: 2023-10-24 | Stop reason: HOSPADM

## 2023-10-22 RX ORDER — OXYCODONE HYDROCHLORIDE 5 MG/1
5 TABLET ORAL EVERY 4 HOURS PRN
Status: DISCONTINUED | OUTPATIENT
Start: 2023-10-22 | End: 2023-10-24

## 2023-10-22 RX ORDER — LISINOPRIL 10 MG/1
10 TABLET ORAL DAILY
Status: DISCONTINUED | OUTPATIENT
Start: 2023-10-22 | End: 2023-10-22

## 2023-10-22 RX ORDER — HYDRALAZINE HYDROCHLORIDE 20 MG/ML
10 INJECTION INTRAMUSCULAR; INTRAVENOUS ONCE
Status: COMPLETED | OUTPATIENT
Start: 2023-10-22 | End: 2023-10-22

## 2023-10-22 RX ADMIN — HYDROMORPHONE HYDROCHLORIDE 0.5 MG: 1 INJECTION, SOLUTION INTRAMUSCULAR; INTRAVENOUS; SUBCUTANEOUS at 10:20

## 2023-10-22 RX ADMIN — MORPHINE SULFATE 10 MG: 10 INJECTION, SOLUTION INTRAMUSCULAR; INTRAVENOUS at 00:53

## 2023-10-22 RX ADMIN — FENTANYL CITRATE 100 MCG: 50 INJECTION INTRAMUSCULAR; INTRAVENOUS at 10:02

## 2023-10-22 RX ADMIN — ACETAMINOPHEN 650 MG: 325 TABLET ORAL at 22:59

## 2023-10-22 RX ADMIN — SODIUM CHLORIDE 2000 MG: 900 INJECTION INTRAVENOUS at 16:50

## 2023-10-22 RX ADMIN — MORPHINE SULFATE 4 MG: 4 INJECTION, SOLUTION INTRAMUSCULAR; INTRAVENOUS at 05:11

## 2023-10-22 RX ADMIN — KETOROLAC TROMETHAMINE 30 MG: 30 INJECTION, SOLUTION INTRAMUSCULAR; INTRAVENOUS at 20:41

## 2023-10-22 RX ADMIN — DEXAMETHASONE SODIUM PHOSPHATE 8 MG: 4 INJECTION, SOLUTION INTRAMUSCULAR; INTRAVENOUS at 12:47

## 2023-10-22 RX ADMIN — OXYCODONE HYDROCHLORIDE 10 MG: 10 TABLET ORAL at 20:40

## 2023-10-22 RX ADMIN — MORPHINE SULFATE 4 MG: 4 INJECTION, SOLUTION INTRAMUSCULAR; INTRAVENOUS at 02:42

## 2023-10-22 RX ADMIN — SODIUM CHLORIDE 2000 MG: 900 INJECTION INTRAVENOUS at 01:01

## 2023-10-22 RX ADMIN — SODIUM CHLORIDE, PRESERVATIVE FREE 10 ML: 5 INJECTION INTRAVENOUS at 20:41

## 2023-10-22 RX ADMIN — FAMOTIDINE 20 MG: 20 TABLET ORAL at 20:40

## 2023-10-22 RX ADMIN — SODIUM CHLORIDE, PRESERVATIVE FREE 10 ML: 5 INJECTION INTRAVENOUS at 08:40

## 2023-10-22 RX ADMIN — MORPHINE SULFATE 4 MG: 4 INJECTION, SOLUTION INTRAMUSCULAR; INTRAVENOUS at 14:40

## 2023-10-22 RX ADMIN — LIDOCAINE HYDROCHLORIDE 30 MG: 10 INJECTION, SOLUTION EPIDURAL; INFILTRATION; INTRACAUDAL; PERINEURAL at 10:02

## 2023-10-22 RX ADMIN — KETOROLAC TROMETHAMINE 30 MG: 30 INJECTION, SOLUTION INTRAMUSCULAR; INTRAVENOUS at 07:10

## 2023-10-22 RX ADMIN — ROCURONIUM BROMIDE 40 MG: 10 INJECTION INTRAVENOUS at 10:05

## 2023-10-22 RX ADMIN — HYDROMORPHONE HYDROCHLORIDE 0.5 MG: 1 INJECTION, SOLUTION INTRAMUSCULAR; INTRAVENOUS; SUBCUTANEOUS at 13:36

## 2023-10-22 RX ADMIN — HYDRALAZINE HYDROCHLORIDE 10 MG: 20 INJECTION INTRAMUSCULAR; INTRAVENOUS at 13:43

## 2023-10-22 RX ADMIN — HYDROMORPHONE HYDROCHLORIDE 0.25 MG: 1 INJECTION, SOLUTION INTRAMUSCULAR; INTRAVENOUS; SUBCUTANEOUS at 13:47

## 2023-10-22 RX ADMIN — KETOROLAC TROMETHAMINE 30 MG: 30 INJECTION, SOLUTION INTRAMUSCULAR; INTRAVENOUS at 02:42

## 2023-10-22 RX ADMIN — HYDROMORPHONE HYDROCHLORIDE 0.5 MG: 1 INJECTION, SOLUTION INTRAMUSCULAR; INTRAVENOUS; SUBCUTANEOUS at 11:10

## 2023-10-22 RX ADMIN — SENNOSIDES AND DOCUSATE SODIUM 1 TABLET: 8.6; 5 TABLET ORAL at 20:40

## 2023-10-22 RX ADMIN — MIDAZOLAM 2 MG: 1 INJECTION INTRAMUSCULAR; INTRAVENOUS at 10:02

## 2023-10-22 RX ADMIN — HYDROMORPHONE HYDROCHLORIDE 0.5 MG: 1 INJECTION, SOLUTION INTRAMUSCULAR; INTRAVENOUS; SUBCUTANEOUS at 12:55

## 2023-10-22 RX ADMIN — OXYCODONE HYDROCHLORIDE 10 MG: 10 TABLET ORAL at 16:43

## 2023-10-22 RX ADMIN — PROPOFOL 200 MG: 10 INJECTION, EMULSION INTRAVENOUS at 10:05

## 2023-10-22 RX ADMIN — HYDROMORPHONE HYDROCHLORIDE 0.5 MG: 1 INJECTION, SOLUTION INTRAMUSCULAR; INTRAVENOUS; SUBCUTANEOUS at 22:58

## 2023-10-22 RX ADMIN — ACETAMINOPHEN 650 MG: 325 TABLET ORAL at 16:43

## 2023-10-22 RX ADMIN — ASPIRIN 81 MG: 81 TABLET, COATED ORAL at 20:40

## 2023-10-22 RX ADMIN — HYDROMORPHONE HYDROCHLORIDE 0.5 MG: 1 INJECTION, SOLUTION INTRAMUSCULAR; INTRAVENOUS; SUBCUTANEOUS at 10:35

## 2023-10-22 RX ADMIN — HYDROMORPHONE HYDROCHLORIDE 0.5 MG: 1 INJECTION, SOLUTION INTRAMUSCULAR; INTRAVENOUS; SUBCUTANEOUS at 18:22

## 2023-10-22 RX ADMIN — KETOROLAC TROMETHAMINE 30 MG: 30 INJECTION, SOLUTION INTRAMUSCULAR; INTRAVENOUS at 14:23

## 2023-10-22 RX ADMIN — MORPHINE SULFATE 4 MG: 4 INJECTION, SOLUTION INTRAMUSCULAR; INTRAVENOUS at 08:36

## 2023-10-22 RX ADMIN — CEFAZOLIN SODIUM 2 G: 1 INJECTION, POWDER, FOR SOLUTION INTRAMUSCULAR; INTRAVENOUS at 10:12

## 2023-10-22 RX ADMIN — HYDROMORPHONE HYDROCHLORIDE 0.5 MG: 1 INJECTION, SOLUTION INTRAMUSCULAR; INTRAVENOUS; SUBCUTANEOUS at 13:56

## 2023-10-22 RX ADMIN — ONDANSETRON 4 MG: 2 INJECTION INTRAMUSCULAR; INTRAVENOUS at 12:47

## 2023-10-22 RX ADMIN — SODIUM CHLORIDE: 9 INJECTION, SOLUTION INTRAVENOUS at 05:15

## 2023-10-22 RX ADMIN — HYDROMORPHONE HYDROCHLORIDE 0.25 MG: 1 INJECTION, SOLUTION INTRAMUSCULAR; INTRAVENOUS; SUBCUTANEOUS at 13:27

## 2023-10-22 ASSESSMENT — PAIN DESCRIPTION - ONSET
ONSET: ON-GOING
ONSET: ON-GOING
ONSET: AWAKENED FROM SLEEP
ONSET: ON-GOING
ONSET: ON-GOING

## 2023-10-22 ASSESSMENT — ENCOUNTER SYMPTOMS
DIARRHEA: 0
SHORTNESS OF BREATH: 0
NAUSEA: 0
CHOKING: 0
CONSTIPATION: 0
ANAL BLEEDING: 0
COLOR CHANGE: 0
APNEA: 0
BLOOD IN STOOL: 0
EYE REDNESS: 0
PHOTOPHOBIA: 0
STRIDOR: 0
RECTAL PAIN: 0
ABDOMINAL PAIN: 0
VOMITING: 0
EYE ITCHING: 0
CHEST TIGHTNESS: 0
WHEEZING: 0
EYE DISCHARGE: 0
COUGH: 0
ABDOMINAL DISTENTION: 0
BACK PAIN: 0
SHORTNESS OF BREATH: 0
EYE PAIN: 0

## 2023-10-22 ASSESSMENT — PAIN - FUNCTIONAL ASSESSMENT
PAIN_FUNCTIONAL_ASSESSMENT: PREVENTS OR INTERFERES SOME ACTIVE ACTIVITIES AND ADLS

## 2023-10-22 ASSESSMENT — PAIN DESCRIPTION - LOCATION
LOCATION: ARM
LOCATION: WRIST
LOCATION: ARM

## 2023-10-22 ASSESSMENT — PAIN DESCRIPTION - ORIENTATION
ORIENTATION: LEFT
ORIENTATION: RIGHT
ORIENTATION: RIGHT
ORIENTATION: LEFT

## 2023-10-22 ASSESSMENT — PAIN DESCRIPTION - DESCRIPTORS
DESCRIPTORS: SHARP;SHOOTING
DESCRIPTORS: THROBBING;SHARP
DESCRIPTORS: ACHING
DESCRIPTORS: SHARP;SHOOTING
DESCRIPTORS: ACHING

## 2023-10-22 ASSESSMENT — PAIN SCALES - GENERAL
PAINLEVEL_OUTOF10: 10
PAINLEVEL_OUTOF10: 7
PAINLEVEL_OUTOF10: 5
PAINLEVEL_OUTOF10: 6
PAINLEVEL_OUTOF10: 2
PAINLEVEL_OUTOF10: 4
PAINLEVEL_OUTOF10: 7
PAINLEVEL_OUTOF10: 7
PAINLEVEL_OUTOF10: 5
PAINLEVEL_OUTOF10: 6
PAINLEVEL_OUTOF10: 10
PAINLEVEL_OUTOF10: 9
PAINLEVEL_OUTOF10: 9

## 2023-10-22 ASSESSMENT — PAIN SCALES - WONG BAKER: WONGBAKER_NUMERICALRESPONSE: 4

## 2023-10-22 ASSESSMENT — PAIN DESCRIPTION - PAIN TYPE
TYPE: SURGICAL PAIN

## 2023-10-22 ASSESSMENT — PAIN DESCRIPTION - FREQUENCY
FREQUENCY: CONTINUOUS

## 2023-10-22 NOTE — ED PROVIDER NOTES
DISPOSITION/PLAN   DISPOSITION      Admission    (Please note that portions ofthis note were completed with a voice recognition program.  Efforts were made to edit the dictations but occasionally words are mis-transcribed.)    Nitin Hooper MD(electronically signed)  Attending Emergency Physician        Nitin Hooper MD  10/22/23 2320

## 2023-10-22 NOTE — H&P
V2.0  History and Physical      Name:  Arvind Stanton /Age/Sex: 1990  (34 y.o. male)   MRN & CSN:  [id number] & [id number] Encounter Date/Time: 10/22/2023 7:45 AM EDT   Location:  [id number] PCP: Saud Olson, APRN [address] Day: 1    Assessment and Plan:   Arvind Stanton is a 34 y.o. male with a pmh of tobacco use disorder, anxiety, remote opiate use disorder who presents with Wrist fracture, closed, left, initial encounter    Hospital Problems             Last Modified POA    * (Principal) Wrist fracture, closed, left, initial encounter 10/22/2023 Yes       Plan:  Displaced fractures of the distal shaft of the left radius and ulna n.p.o., IV analgesia and antiemetics. IV fluids consult orthopedics for surgical intervention 2. Hypertension: Medications. Patient has a prescription for lisinopril which she intends to get filled upon discharge  Tobacco use disorder: Quarter pack per day smoker nicotine replacement therapy ordered  Marijuana use: Patient last used 3 weeks ago. opiate use disorder: Reports he has not    Disposition:   Current Living situation: Home  Expected Disposition: Home  Estimated D/C: In 2 days  Diet Diet NPO   DVT Prophylaxis [] Lovenox, []  Heparin, [x] SCDs, [] Ambulation,  [] Eliquis, [] Xarelto, [] Coumadin   Code Status Full Code   Surrogate Decision Maker/ POA      Personally reviewed Lab Studies and Imaging             History from:     patient    History of Present Illness:     Chief Complaint: Left wrist pain  Arvind Stanton is a 34 y.o. male with pmh of hypertension, anxiety who presents status post fall with a left wrist fracture. Patient was outside playing with his son. He was chasing around a corner when he lost his footing and fell on his left wrist.  At present his distal left arm is splinted there is visible blood on the bandages and his pain is uncontrolled.   Review of Systems:        Pertinent positives and negatives discussed in HPI

## 2023-10-22 NOTE — CONSULTS
[facility] Orthopedic Surgery  Consult Note        This patient is seen in consultation at the request of Dr William Fish MD    Reason for Consult:  Open left radius ulna fracture    CHIEF COMPLAINT:  same    History Obtained From:  patient, electronic medical record    HISTORY OF PRESENT ILLNESS:    Shawn Cohen is a 34 y.o. male who presents to the emergency department with forearm fracture which happened around 0030 on 10/22/2023, early hours of the morning. Patient presented with mechanical fall. Reports explained with his kids in a parking lot and he fell broke his left arm as it went into a flexed wrist position. 10 of 10 sharp pain no other associated symptoms. Mild numbness but no escalating pain or parasthesias. He is RHD. Past Medical History:        Diagnosis Date    Hypertension     Migraines        Past Surgical History:        Procedure Laterality Date    NOSE SURGERY         Medications prior to admission:   Prior to Admission medications    Medication Sig Start Date End Date Taking?  Authorizing Provider   SUMAtriptan (IMITREX) 50 MG tablet Take 1 tablet by mouth once as needed for Migraine 8/16/23 8/16/23  REBA Arias - CNP       Current Medications:   Current Facility-Administered Medications: tetanus-diphth-acell pertussis (BOOSTRIX) injection 0.5 mL, 0.5 mL, IntraMUSCular, Once  sodium chloride flush 0.9 % injection 5-40 mL, 5-40 mL, IntraVENous, 2 times per day  sodium chloride flush 0.9 % injection 5-40 mL, 5-40 mL, IntraVENous, PRN  0.9 % sodium chloride infusion, , IntraVENous, PRN  ondansetron (ZOFRAN-ODT) disintegrating tablet 4 mg, 4 mg, Oral, Q8H PRN **OR** ondansetron (ZOFRAN) injection 4 mg, 4 mg, IntraVENous, Q6H PRN  polyethylene glycol (GLYCOLAX) packet 17 g, 17 g, Oral, Daily PRN  acetaminophen (TYLENOL) tablet 650 mg, 650 mg, Oral, Q4H PRN  morphine (PF) injection 2 mg, 2 mg, IntraVENous, Q2H PRN **OR** morphine (PF) injection 4 mg, 4 mg, IntraVENous, Q2H

## 2023-10-22 NOTE — ED NOTES
Report called to [facility] on 3W. All questions answered.      Hammad Hugo RN  10/22/23 2248

## 2023-10-22 NOTE — OP NOTE
check in 3-5 days, then  wound check . And staple removal in 2 weeks, with ROM as tolerated but no weight bearing/heavy lifting for 6-8 weeks until evidence of radiographic/clinical union. Sincerely,  . Sincerely,    Storm Calabrese MD [address] [place] [address], [address] [place] [address], [facility]  Email: [email or web address]  Office: [phone]    10/22/23  2:01 PM        Electronically signed by Storm Calabrese MD on 10/22/2023 at 2:01 PM

## 2023-10-22 NOTE — PLAN OF CARE
Problem: Discharge Planning  Goal: Discharge to home or other facility with appropriate resources  10/22/2023 1736 by Guicho Garcia RN  Outcome: Progressing  Flowsheets (Taken 10/22/2023 1500)  Discharge to home or other facility with appropriate resources: Identify barriers to discharge with patient and caregiver  10/22/2023 0530 by Paul Laird RN  Outcome: Progressing  Flowsheets (Taken 10/22/2023 0530)  Discharge to home or other facility with appropriate resources:   Identify barriers to discharge with patient and caregiver   Identify discharge learning needs (meds, wound care, etc)   Refer to discharge planning if patient needs post-hospital services based on physician order or complex needs related to functional status, cognitive ability or social support system   Arrange for needed discharge resources and transportation as appropriate   Arrange for interpreters to assist at discharge as needed     Problem: Safety - Adult  Goal: Free from fall injury  10/22/2023 1736 by Guicho Garcia RN  Outcome: Progressing  Flowsheets (Taken 10/22/2023 1734)  Free From Fall Injury: Instruct family/caregiver on patient safety  10/22/2023 0530 by Paul Laird RN  Outcome: Progressing  Flowsheets (Taken 10/22/2023 0530)  Free From Fall Injury:   Instruct family/caregiver on patient safety   Based on caregiver fall risk screen, instruct family/caregiver to ask for assistance with transferring infant if caregiver noted to have fall risk factors     Problem: ABCDS Injury Assessment  Goal: Absence of physical injury  Outcome: Progressing

## 2023-10-22 NOTE — PLAN OF CARE
Problem: Discharge Planning  Goal: Discharge to home or other facility with appropriate resources  Outcome: Progressing  Flowsheets (Taken 10/22/2023 0530)  Discharge to home or other facility with appropriate resources:   Identify barriers to discharge with patient and caregiver   Identify discharge learning needs (meds, wound care, etc)   Refer to discharge planning if patient needs post-hospital services based on physician order or complex needs related to functional status, cognitive ability or social support system   Arrange for needed discharge resources and transportation as appropriate   Arrange for interpreters to assist at discharge as needed     Problem: Safety - Adult  Goal: Free from fall injury  Outcome: Progressing  Flowsheets (Taken 10/22/2023 0530)  Free From Fall Injury:   Instruct family/caregiver on patient safety   Based on caregiver fall risk screen, instruct family/caregiver to ask for assistance with transferring infant if caregiver noted to have fall risk factors

## 2023-10-23 LAB
ANION GAP SERPL CALCULATED.3IONS-SCNC: 12 MMOL/L (ref 3–16)
BASOPHILS # BLD: 0 K/UL (ref 0–0.2)
BASOPHILS NFR BLD: 0.2 %
BUN SERPL-MCNC: 11 MG/DL (ref 7–20)
CALCIUM SERPL-MCNC: 8 MG/DL (ref 8.3–10.6)
CHLORIDE SERPL-SCNC: 104 MMOL/L (ref 99–110)
CO2 SERPL-SCNC: 22 MMOL/L (ref 21–32)
CREAT SERPL-MCNC: 0.9 MG/DL (ref 0.9–1.3)
DEPRECATED RDW RBC AUTO: 13 % (ref 12.4–15.4)
EOSINOPHIL # BLD: 0 K/UL (ref 0–0.6)
EOSINOPHIL NFR BLD: 0.1 %
GFR SERPLBLD CREATININE-BSD FMLA CKD-EPI: >60 ML/MIN/{1.73_M2}
GLUCOSE SERPL-MCNC: 111 MG/DL (ref 70–99)
HCT VFR BLD AUTO: 39.9 % (ref 40.5–52.5)
HGB BLD-MCNC: 13.8 G/DL (ref 13.5–17.5)
LYMPHOCYTES # BLD: 1.8 K/UL (ref 1–5.1)
LYMPHOCYTES NFR BLD: 13.8 %
MCH RBC QN AUTO: 30.1 PG (ref 26–34)
MCHC RBC AUTO-ENTMCNC: 34.5 G/DL (ref 31–36)
MCV RBC AUTO: 87.1 FL (ref 80–100)
MONOCYTES # BLD: 1.2 K/UL (ref 0–1.3)
MONOCYTES NFR BLD: 9.3 %
NEUTROPHILS # BLD: 10 K/UL (ref 1.7–7.7)
NEUTROPHILS NFR BLD: 76.6 %
PATH INTERP BLD-IMP: NORMAL
PLATELET # BLD AUTO: 297 K/UL (ref 135–450)
PMV BLD AUTO: 7.6 FL (ref 5–10.5)
POTASSIUM SERPL-SCNC: 4.1 MMOL/L (ref 3.5–5.1)
RBC # BLD AUTO: 4.58 M/UL (ref 4.2–5.9)
SODIUM SERPL-SCNC: 138 MMOL/L (ref 136–145)
WBC # BLD AUTO: 13.1 K/UL (ref 4–11)

## 2023-10-23 PROCEDURE — 6360000002 HC RX W HCPCS: Performed by: NURSE PRACTITIONER

## 2023-10-23 PROCEDURE — 94760 N-INVAS EAR/PLS OXIMETRY 1: CPT

## 2023-10-23 PROCEDURE — 36415 COLL VENOUS BLD VENIPUNCTURE: CPT

## 2023-10-23 PROCEDURE — 6370000000 HC RX 637 (ALT 250 FOR IP): Performed by: ORTHOPAEDIC SURGERY

## 2023-10-23 PROCEDURE — 6370000000 HC RX 637 (ALT 250 FOR IP): Performed by: STUDENT IN AN ORGANIZED HEALTH CARE EDUCATION/TRAINING PROGRAM

## 2023-10-23 PROCEDURE — 80048 BASIC METABOLIC PNL TOTAL CA: CPT

## 2023-10-23 PROCEDURE — 6360000002 HC RX W HCPCS: Performed by: ORTHOPAEDIC SURGERY

## 2023-10-23 PROCEDURE — 97165 OT EVAL LOW COMPLEX 30 MIN: CPT

## 2023-10-23 PROCEDURE — 2580000003 HC RX 258: Performed by: NURSE PRACTITIONER

## 2023-10-23 PROCEDURE — 2580000003 HC RX 258: Performed by: ORTHOPAEDIC SURGERY

## 2023-10-23 PROCEDURE — 97530 THERAPEUTIC ACTIVITIES: CPT

## 2023-10-23 PROCEDURE — 85025 COMPLETE CBC W/AUTO DIFF WBC: CPT

## 2023-10-23 PROCEDURE — 1200000000 HC SEMI PRIVATE

## 2023-10-23 RX ORDER — CEPHALEXIN 500 MG/1
500 CAPSULE ORAL 3 TIMES DAILY
Qty: 21 CAPSULE | Refills: 0 | Status: SHIPPED | OUTPATIENT
Start: 2023-10-23 | End: 2023-10-30

## 2023-10-23 RX ORDER — OXYCODONE HYDROCHLORIDE 5 MG/1
5-10 TABLET ORAL EVERY 6 HOURS PRN
Qty: 40 TABLET | Refills: 0 | Status: SHIPPED | OUTPATIENT
Start: 2023-10-23 | End: 2023-10-24 | Stop reason: HOSPADM

## 2023-10-23 RX ADMIN — HYDROMORPHONE HYDROCHLORIDE 0.5 MG: 1 INJECTION, SOLUTION INTRAMUSCULAR; INTRAVENOUS; SUBCUTANEOUS at 15:59

## 2023-10-23 RX ADMIN — SODIUM CHLORIDE 2000 MG: 900 INJECTION INTRAVENOUS at 01:36

## 2023-10-23 RX ADMIN — ACETAMINOPHEN 650 MG: 325 TABLET ORAL at 10:28

## 2023-10-23 RX ADMIN — ACETAMINOPHEN 650 MG: 325 TABLET ORAL at 05:37

## 2023-10-23 RX ADMIN — ASPIRIN 81 MG: 81 TABLET, COATED ORAL at 20:31

## 2023-10-23 RX ADMIN — HYDROMORPHONE HYDROCHLORIDE 0.5 MG: 1 INJECTION, SOLUTION INTRAMUSCULAR; INTRAVENOUS; SUBCUTANEOUS at 03:51

## 2023-10-23 RX ADMIN — HYDROMORPHONE HYDROCHLORIDE 0.5 MG: 1 INJECTION, SOLUTION INTRAMUSCULAR; INTRAVENOUS; SUBCUTANEOUS at 08:20

## 2023-10-23 RX ADMIN — FAMOTIDINE 20 MG: 20 TABLET ORAL at 08:20

## 2023-10-23 RX ADMIN — KETOROLAC TROMETHAMINE 30 MG: 30 INJECTION, SOLUTION INTRAMUSCULAR; INTRAVENOUS at 13:53

## 2023-10-23 RX ADMIN — KETOROLAC TROMETHAMINE 30 MG: 30 INJECTION, SOLUTION INTRAMUSCULAR; INTRAVENOUS at 08:20

## 2023-10-23 RX ADMIN — SODIUM CHLORIDE, PRESERVATIVE FREE 10 ML: 5 INJECTION INTRAVENOUS at 08:26

## 2023-10-23 RX ADMIN — OXYCODONE HYDROCHLORIDE 10 MG: 10 TABLET ORAL at 05:37

## 2023-10-23 RX ADMIN — HYDROMORPHONE HYDROCHLORIDE 0.5 MG: 1 INJECTION, SOLUTION INTRAMUSCULAR; INTRAVENOUS; SUBCUTANEOUS at 22:46

## 2023-10-23 RX ADMIN — OXYCODONE HYDROCHLORIDE 10 MG: 10 TABLET ORAL at 13:53

## 2023-10-23 RX ADMIN — CEFAZOLIN 1000 MG: 1 INJECTION, POWDER, FOR SOLUTION INTRAMUSCULAR; INTRAVENOUS at 18:08

## 2023-10-23 RX ADMIN — ASPIRIN 81 MG: 81 TABLET, COATED ORAL at 08:20

## 2023-10-23 RX ADMIN — OXYCODONE HYDROCHLORIDE 10 MG: 10 TABLET ORAL at 10:20

## 2023-10-23 RX ADMIN — ACETAMINOPHEN 650 MG: 325 TABLET ORAL at 18:05

## 2023-10-23 RX ADMIN — KETOROLAC TROMETHAMINE 30 MG: 30 INJECTION, SOLUTION INTRAMUSCULAR; INTRAVENOUS at 03:51

## 2023-10-23 RX ADMIN — CEFAZOLIN 1000 MG: 1 INJECTION, POWDER, FOR SOLUTION INTRAMUSCULAR; INTRAVENOUS at 10:34

## 2023-10-23 RX ADMIN — OXYCODONE HYDROCHLORIDE 10 MG: 10 TABLET ORAL at 18:05

## 2023-10-23 RX ADMIN — FAMOTIDINE 20 MG: 20 TABLET ORAL at 20:31

## 2023-10-23 RX ADMIN — HYDROMORPHONE HYDROCHLORIDE 0.5 MG: 1 INJECTION, SOLUTION INTRAMUSCULAR; INTRAVENOUS; SUBCUTANEOUS at 12:19

## 2023-10-23 RX ADMIN — SODIUM CHLORIDE, PRESERVATIVE FREE 10 ML: 5 INJECTION INTRAVENOUS at 20:34

## 2023-10-23 RX ADMIN — KETOROLAC TROMETHAMINE 30 MG: 30 INJECTION, SOLUTION INTRAMUSCULAR; INTRAVENOUS at 20:33

## 2023-10-23 RX ADMIN — OXYCODONE HYDROCHLORIDE 10 MG: 10 TABLET ORAL at 01:00

## 2023-10-23 ASSESSMENT — PAIN DESCRIPTION - ORIENTATION
ORIENTATION: LEFT

## 2023-10-23 ASSESSMENT — PAIN SCALES - GENERAL
PAINLEVEL_OUTOF10: 7
PAINLEVEL_OUTOF10: 8
PAINLEVEL_OUTOF10: 5
PAINLEVEL_OUTOF10: 7
PAINLEVEL_OUTOF10: 7
PAINLEVEL_OUTOF10: 2
PAINLEVEL_OUTOF10: 8
PAINLEVEL_OUTOF10: 7
PAINLEVEL_OUTOF10: 5
PAINLEVEL_OUTOF10: 6
PAINLEVEL_OUTOF10: 7
PAINLEVEL_OUTOF10: 7

## 2023-10-23 ASSESSMENT — PAIN DESCRIPTION - ONSET: ONSET: ON-GOING

## 2023-10-23 ASSESSMENT — PAIN DESCRIPTION - LOCATION
LOCATION: ARM
LOCATION: WRIST
LOCATION: ARM
LOCATION: WRIST
LOCATION: WRIST
LOCATION: ARM
LOCATION: ARM
LOCATION: WRIST

## 2023-10-23 ASSESSMENT — PAIN DESCRIPTION - DESCRIPTORS
DESCRIPTORS: ACHING;DISCOMFORT
DESCRIPTORS: STABBING

## 2023-10-23 ASSESSMENT — PAIN DESCRIPTION - PAIN TYPE: TYPE: SURGICAL PAIN

## 2023-10-23 ASSESSMENT — PAIN SCALES - WONG BAKER
WONGBAKER_NUMERICALRESPONSE: 2

## 2023-10-23 ASSESSMENT — PAIN - FUNCTIONAL ASSESSMENT: PAIN_FUNCTIONAL_ASSESSMENT: PREVENTS OR INTERFERES SOME ACTIVE ACTIVITIES AND ADLS

## 2023-10-23 ASSESSMENT — PAIN DESCRIPTION - FREQUENCY: FREQUENCY: CONTINUOUS

## 2023-10-23 NOTE — PLAN OF CARE
Problem: Discharge Planning  Goal: Discharge to home or other facility with appropriate resources  10/23/2023 0505 by Mary Alva RN  Outcome: Progressing  Flowsheets (Taken 10/22/2023 2053)  Discharge to home or other facility with appropriate resources:   Identify barriers to discharge with patient and caregiver   Arrange for needed discharge resources and transportation as appropriate   Identify discharge learning needs (meds, wound care, etc)  10/22/2023 1736 by Conrad Daley RN  Outcome: Progressing  Flowsheets (Taken 10/22/2023 1500)  Discharge to home or other facility with appropriate resources: Identify barriers to discharge with patient and caregiver     Problem: Safety - Adult  Goal: Free from fall injury  10/23/2023 0505 by Mary Alva RN  Outcome: Progressing  Flowsheets (Taken 10/22/2023 2327)  Free From Fall Injury: Instruct family/caregiver on patient safety  10/22/2023 1736 by Conrad Daley RN  Outcome: Progressing  Flowsheets (Taken 10/22/2023 1734)  Free From Fall Injury: Instruct family/caregiver on patient safety     Problem: ABCDS Injury Assessment  Goal: Absence of physical injury  10/23/2023 0505 by Mary Alva RN  Outcome: Progressing  Flowsheets (Taken 10/22/2023 2327)  Absence of Physical Injury: Implement safety measures based on patient assessment  10/22/2023 1736 by Conrad Daley RN  Outcome: Progressing

## 2023-10-23 NOTE — DISCHARGE INSTRUCTIONS
Call for increase in pain or swelling in operative extremity. Call for loss of movement or sensation in operative extremity. Call for fever over 101 F. Elevate and ice left wrist to reduce pain and swelling  Nonweightbearing on left hand  Keep left wrist splint and ACE clean and dry, DO NOT remove  Take antibiotics as ordered. Followup Dr Chelsey Garcia in office in 2 weeks  Alannah Lopes MD [facility] and [address] [place] [address] [place], [address] [place] [address], [facility]  Email: [email or web address]  Office: [phone]

## 2023-10-23 NOTE — PLAN OF CARE
Problem: Discharge Planning  Goal: Discharge to home or other facility with appropriate resources  10/23/2023 1112 by Lidya Ireland RN  Outcome: Progressing  Flowsheets (Taken 10/23/2023 7481)  Discharge to home or other facility with appropriate resources: Identify barriers to discharge with patient and caregiver  10/23/2023 0505 by Monique Cain RN  Outcome: Progressing  Flowsheets (Taken 10/22/2023 2053)  Discharge to home or other facility with appropriate resources:   Identify barriers to discharge with patient and caregiver   Arrange for needed discharge resources and transportation as appropriate   Identify discharge learning needs (meds, wound care, etc)     Problem: Safety - Adult  Goal: Free from fall injury  10/23/2023 1112 by Lidya Ireland RN  Outcome: Progressing  Flowsheets (Taken 10/23/2023 0842)  Free From Fall Injury: Instruct family/caregiver on patient safety  10/23/2023 0505 by Monique Cain RN  Outcome: Progressing  Flowsheets (Taken 10/22/2023 2327)  Free From Fall Injury: Instruct family/caregiver on patient safety     Problem: ABCDS Injury Assessment  Goal: Absence of physical injury  10/23/2023 1112 by Lidya Ireland RN  Outcome: Progressing  Flowsheets (Taken 10/23/2023 0842)  Absence of Physical Injury: Implement safety measures based on patient assessment  10/23/2023 0505 by Monique Cain RN  Outcome: Progressing  Flowsheets (Taken 10/22/2023 2327)  Absence of Physical Injury: Implement safety measures based on patient assessment

## 2023-10-23 NOTE — CARE COORDINATION
A quick chart review reveals that a full assessment is not required at this time. He is from home. Waiting on ortho clearance. OT signed off today. He is a likely discharge in the morning if we can wean down his pain medication overnight. Respectfully submitted,    Isabella Angela MSW, [facility]   [phone]    Electronically signed by DEWEY Rousseau, LSW on 10/23/2023 at 4:02 PM

## 2023-10-24 VITALS
DIASTOLIC BLOOD PRESSURE: 87 MMHG | WEIGHT: 196.21 LBS | BODY MASS INDEX: 27.37 KG/M2 | RESPIRATION RATE: 18 BRPM | TEMPERATURE: 98.9 F | HEART RATE: 76 BPM | SYSTOLIC BLOOD PRESSURE: 148 MMHG | OXYGEN SATURATION: 97 %

## 2023-10-24 PROCEDURE — 2580000003 HC RX 258: Performed by: NURSE PRACTITIONER

## 2023-10-24 PROCEDURE — 6360000002 HC RX W HCPCS: Performed by: NURSE PRACTITIONER

## 2023-10-24 PROCEDURE — 6370000000 HC RX 637 (ALT 250 FOR IP): Performed by: NURSE PRACTITIONER

## 2023-10-24 PROCEDURE — 6370000000 HC RX 637 (ALT 250 FOR IP): Performed by: STUDENT IN AN ORGANIZED HEALTH CARE EDUCATION/TRAINING PROGRAM

## 2023-10-24 PROCEDURE — 94760 N-INVAS EAR/PLS OXIMETRY 1: CPT

## 2023-10-24 PROCEDURE — 6360000002 HC RX W HCPCS: Performed by: ORTHOPAEDIC SURGERY

## 2023-10-24 PROCEDURE — 6370000000 HC RX 637 (ALT 250 FOR IP): Performed by: ORTHOPAEDIC SURGERY

## 2023-10-24 RX ORDER — HYDROMORPHONE HYDROCHLORIDE 2 MG/1
2 TABLET ORAL EVERY 6 HOURS PRN
Qty: 20 TABLET | Refills: 0 | Status: SHIPPED | OUTPATIENT
Start: 2023-10-24 | End: 2023-10-29

## 2023-10-24 RX ORDER — HYDROMORPHONE HYDROCHLORIDE 2 MG/1
2 TABLET ORAL EVERY 6 HOURS PRN
Status: DISCONTINUED | OUTPATIENT
Start: 2023-10-24 | End: 2023-10-24 | Stop reason: HOSPADM

## 2023-10-24 RX ORDER — GABAPENTIN 100 MG/1
200 CAPSULE ORAL ONCE
Status: COMPLETED | OUTPATIENT
Start: 2023-10-24 | End: 2023-10-24

## 2023-10-24 RX ORDER — ASPIRIN 81 MG/1
81 TABLET ORAL 2 TIMES DAILY
Qty: 28 TABLET | Refills: 0 | Status: SHIPPED | OUTPATIENT
Start: 2023-10-24 | End: 2023-11-07

## 2023-10-24 RX ORDER — HYDROMORPHONE HYDROCHLORIDE 2 MG/1
1 TABLET ORAL EVERY 4 HOURS PRN
Status: DISCONTINUED | OUTPATIENT
Start: 2023-10-24 | End: 2023-10-24

## 2023-10-24 RX ADMIN — HYDROMORPHONE HYDROCHLORIDE 2 MG: 2 TABLET ORAL at 10:55

## 2023-10-24 RX ADMIN — ACETAMINOPHEN 650 MG: 325 TABLET ORAL at 00:11

## 2023-10-24 RX ADMIN — ASPIRIN 81 MG: 81 TABLET, COATED ORAL at 10:02

## 2023-10-24 RX ADMIN — KETOROLAC TROMETHAMINE 30 MG: 30 INJECTION, SOLUTION INTRAMUSCULAR; INTRAVENOUS at 14:45

## 2023-10-24 RX ADMIN — KETOROLAC TROMETHAMINE 30 MG: 30 INJECTION, SOLUTION INTRAMUSCULAR; INTRAVENOUS at 03:20

## 2023-10-24 RX ADMIN — HYDROMORPHONE HYDROCHLORIDE 0.5 MG: 1 INJECTION, SOLUTION INTRAMUSCULAR; INTRAVENOUS; SUBCUTANEOUS at 01:51

## 2023-10-24 RX ADMIN — KETOROLAC TROMETHAMINE 30 MG: 30 INJECTION, SOLUTION INTRAMUSCULAR; INTRAVENOUS at 10:06

## 2023-10-24 RX ADMIN — CEFAZOLIN 1000 MG: 1 INJECTION, POWDER, FOR SOLUTION INTRAMUSCULAR; INTRAVENOUS at 10:22

## 2023-10-24 RX ADMIN — CEFAZOLIN 1000 MG: 1 INJECTION, POWDER, FOR SOLUTION INTRAMUSCULAR; INTRAVENOUS at 03:26

## 2023-10-24 RX ADMIN — OXYCODONE HYDROCHLORIDE 10 MG: 10 TABLET ORAL at 06:32

## 2023-10-24 RX ADMIN — ACETAMINOPHEN 650 MG: 325 TABLET ORAL at 11:50

## 2023-10-24 RX ADMIN — GABAPENTIN 200 MG: 100 CAPSULE ORAL at 01:51

## 2023-10-24 RX ADMIN — ACETAMINOPHEN 650 MG: 325 TABLET ORAL at 06:32

## 2023-10-24 RX ADMIN — FAMOTIDINE 20 MG: 20 TABLET ORAL at 10:03

## 2023-10-24 ASSESSMENT — PAIN DESCRIPTION - ORIENTATION
ORIENTATION: LEFT

## 2023-10-24 ASSESSMENT — PAIN SCALES - GENERAL
PAINLEVEL_OUTOF10: 5
PAINLEVEL_OUTOF10: 7
PAINLEVEL_OUTOF10: 9
PAINLEVEL_OUTOF10: 7
PAINLEVEL_OUTOF10: 7

## 2023-10-24 ASSESSMENT — PAIN - FUNCTIONAL ASSESSMENT
PAIN_FUNCTIONAL_ASSESSMENT: PREVENTS OR INTERFERES SOME ACTIVE ACTIVITIES AND ADLS

## 2023-10-24 ASSESSMENT — PAIN DESCRIPTION - DESCRIPTORS
DESCRIPTORS: STABBING;THROBBING
DESCRIPTORS: THROBBING;STABBING
DESCRIPTORS: THROBBING;STABBING
DESCRIPTORS: STABBING;THROBBING

## 2023-10-24 ASSESSMENT — PAIN DESCRIPTION - LOCATION
LOCATION: ARM
LOCATION: ARM;HAND

## 2023-10-24 NOTE — PLAN OF CARE
Problem: Discharge Planning  Goal: Discharge to home or other facility with appropriate resources  Outcome: Progressing  Flowsheets (Taken 10/23/2023 2036)  Discharge to home or other facility with appropriate resources:   Identify barriers to discharge with patient and caregiver   Arrange for needed discharge resources and transportation as appropriate   Identify discharge learning needs (meds, wound care, etc)     Problem: Safety - Adult  Goal: Free from fall injury  Outcome: Progressing  Flowsheets (Taken 10/24/2023 0451)  Free From Fall Injury: Instruct family/caregiver on patient safety     Problem: ABCDS Injury Assessment  Goal: Absence of physical injury  Outcome: Progressing  Flowsheets (Taken 10/24/2023 0451)  Absence of Physical Injury: Implement safety measures based on patient assessment     Electronically signed by Phuong Ernst RN on 10/24/2023 at 4:51 AM

## 2023-10-24 NOTE — PLAN OF CARE
Problem: Discharge Planning  Goal: Discharge to home or other facility with appropriate resources  10/24/2023 0451 by Bertha Rizvi RN  Outcome: Progressing     Problem: Safety - Adult  Goal: Free from fall injury  10/24/2023 0451 by Bertha Rizvi RN  Outcome: Progressing     Problem: ABCDS Injury Assessment  Goal: Absence of physical injury  10/24/2023 0451 by Bertha Rizvi RN  Outcome: Progressing

## 2023-10-24 NOTE — DISCHARGE SUMMARY
V2.0  Discharge Summary    Name:  Avani Goldstein /Age/Sex: 1990 (34 y.o. male)   Admit Date: 10/22/2023  Discharge Date: 10/24/23    MRN & CSN:  [id number] & [id number] Encounter Date and Time 10/24/23 12:57 PM EDT    Attending:  Sandra Brooks MD Discharging Provider: Sandra Brooks MD       Hospital Course:     Patient is a 37-year-old male past medical history of tobacco abuse, hypertension, who was admitted for displaced open fracture of distal shaft of left radius and ulna. Patient was seen by orthopedics. He underwent open reduction total fixation. He was continued on IV antibiotics and IV pain medications. He was then weaned off of IV pain medications. He was then cleared for discharge by orthopedic surgery on oral antibiotics and oral pain medications. Since patient has benefited the most from hospitalization and since orthopedic surgery has cleared him for discharge, he will be discharged back home in stable condition. He was advised to follow-up with orthopedic surgery outpatient for transition of care. Discharge Instruction:     Primary care physician: REBA Davis CNP within 2 weeks  Diet: regular diet   Activity: activity as tolerated  Disposition: Discharged to:   [x]Home, [][facility], []SNF, []Acute Rehab, []Hospice   Condition on discharge: Stable    Discharge Medications:        Medication List        START taking these medications      aspirin 81 MG EC tablet  Take 1 tablet by mouth in the morning and at bedtime for 14 days     cephALEXin 500 MG capsule  Commonly known as: KEFLEX  Take 1 capsule by mouth 3 times daily for 7 days     HYDROmorphone 2 MG tablet  Commonly known as: DILAUDID  Take 1 tablet by mouth every 6 hours as needed for Pain for up to 5 days.  Max Daily Amount: 8 mg            CONTINUE taking these medications      SUMAtriptan 50 MG tablet  Commonly known as: IMITREX  Take 1 tablet by mouth once as needed for Migraine               Where to Get Your

## 2023-10-25 ENCOUNTER — TELEPHONE (OUTPATIENT)
Dept: PRIMARY CARE CLINIC | Age: 33
End: 2023-10-25

## 2023-10-25 NOTE — TELEPHONE ENCOUNTER
LVM for call back. Need to:   Schedule HOSPITAL FOLLOW UP visit type. Need MA to document TCM notes in this phone encounter. Once both tasks are completed, please close phone encounter.

## 2023-10-26 NOTE — TELEPHONE ENCOUNTER
Care Transitions Initial Follow Up Call    Outreach made within 2 business days of discharge: Yes    Patient: Brenda Teran Patient : 1990   MRN: [id number]  Reason for Admission: Open Wound Fracture of his left wrist. Pt stated that he got plates and screws in his radius and ulna. Discharge Date: 10/24/23       Spoke with: Brenda Teran     Discharge department/facility: [facility] Interactive Patient Contact:  Was patient able to fill all prescriptions: Yes  Was patient instructed to bring all medications to the follow-up visit: Yes  Is patient taking all medications as directed in the discharge summary?  Yes  Does patient understand their discharge instructions: Yes  Does patient have questions or concerns that need addressed prior to 7-14 day follow up office visit: no    Scheduled appointment with PCP within 7-14 days    Follow Up  Future Appointments   Date Time Provider [address] [place] [address]   10/27/2023 10:45 AM REBA Henriquez - CNP [address] [facility] [place]

## 2023-10-26 NOTE — TELEPHONE ENCOUNTER
LVM for pt to give the office a call back to complete TCM. Pt has an appointment scheduled with NP on 10/27/2023.

## 2023-10-27 ENCOUNTER — TELEPHONE (OUTPATIENT)
Dept: ORTHOPEDIC SURGERY | Age: 33
End: 2023-10-27

## 2023-10-27 ENCOUNTER — OFFICE VISIT (OUTPATIENT)
Dept: PRIMARY CARE CLINIC | Age: 33
End: 2023-10-27
Payer: COMMERCIAL

## 2023-10-27 VITALS
DIASTOLIC BLOOD PRESSURE: 90 MMHG | RESPIRATION RATE: 16 BRPM | HEART RATE: 83 BPM | WEIGHT: 204 LBS | TEMPERATURE: 98.5 F | HEIGHT: 71 IN | BODY MASS INDEX: 28.56 KG/M2 | OXYGEN SATURATION: 98 % | SYSTOLIC BLOOD PRESSURE: 140 MMHG

## 2023-10-27 DIAGNOSIS — Z09 FOLLOW-UP EXAM: Primary | ICD-10-CM

## 2023-10-27 DIAGNOSIS — Z09 HOSPITAL DISCHARGE FOLLOW-UP: ICD-10-CM

## 2023-10-27 DIAGNOSIS — S62.102A WRIST FRACTURE, CLOSED, LEFT, INITIAL ENCOUNTER: Primary | ICD-10-CM

## 2023-10-27 DIAGNOSIS — F17.200 SMOKER: ICD-10-CM

## 2023-10-27 DIAGNOSIS — S62.102B: ICD-10-CM

## 2023-10-27 DIAGNOSIS — I10 ESSENTIAL HYPERTENSION: ICD-10-CM

## 2023-10-27 DIAGNOSIS — G43.019 INTRACTABLE MIGRAINE WITHOUT AURA AND WITHOUT STATUS MIGRAINOSUS: ICD-10-CM

## 2023-10-27 PROCEDURE — 3080F DIAST BP >= 90 MM HG: CPT | Performed by: NURSE PRACTITIONER

## 2023-10-27 PROCEDURE — 1111F DSCHRG MED/CURRENT MED MERGE: CPT | Performed by: NURSE PRACTITIONER

## 2023-10-27 PROCEDURE — G8484 FLU IMMUNIZE NO ADMIN: HCPCS | Performed by: NURSE PRACTITIONER

## 2023-10-27 PROCEDURE — G8427 DOCREV CUR MEDS BY ELIG CLIN: HCPCS | Performed by: NURSE PRACTITIONER

## 2023-10-27 PROCEDURE — G8419 CALC BMI OUT NRM PARAM NOF/U: HCPCS | Performed by: NURSE PRACTITIONER

## 2023-10-27 PROCEDURE — 99213 OFFICE O/P EST LOW 20 MIN: CPT | Performed by: NURSE PRACTITIONER

## 2023-10-27 PROCEDURE — 3077F SYST BP >= 140 MM HG: CPT | Performed by: NURSE PRACTITIONER

## 2023-10-27 PROCEDURE — 4004F PT TOBACCO SCREEN RCVD TLK: CPT | Performed by: NURSE PRACTITIONER

## 2023-10-27 RX ORDER — LISINOPRIL 10 MG/1
10 TABLET ORAL DAILY
Qty: 90 TABLET | Refills: 1 | Status: SHIPPED | OUTPATIENT
Start: 2023-10-27 | End: 2024-01-25

## 2023-10-27 RX ORDER — SUMATRIPTAN 50 MG/1
50 TABLET, FILM COATED ORAL AS NEEDED
Qty: 9 TABLET | Refills: 2 | Status: SHIPPED | OUTPATIENT
Start: 2023-10-27

## 2023-10-27 RX ORDER — LISINOPRIL 10 MG/1
TABLET ORAL
COMMUNITY
Start: 2023-10-24 | End: 2023-10-27

## 2023-10-27 ASSESSMENT — ENCOUNTER SYMPTOMS
SHORTNESS OF BREATH: 0
DIARRHEA: 0
CHEST TIGHTNESS: 0
NAUSEA: 0
VOMITING: 0

## 2023-10-27 NOTE — TELEPHONE ENCOUNTER
Prescription Refill     Medication Name:  PAIN MED     Pharmacy: [facility] [id number] Myra Lloyd, [address] [place] [phone]    Patient Contact Number:  [phone]     Pt WENT TO PCP AND ALL LOOKS WELL, BUT HE WILL BE OUT OF PAIN MED, SO NEW RX WILL NEED TO BE SENT TO HIS PHARMACY, PLEASE?

## 2023-10-30 ENCOUNTER — TELEPHONE (OUTPATIENT)
Dept: PRIMARY CARE CLINIC | Age: 33
End: 2023-10-30

## 2023-10-30 RX ORDER — HYDROMORPHONE HYDROCHLORIDE 2 MG/1
2 TABLET ORAL EVERY 6 HOURS PRN
Qty: 12 TABLET | Refills: 0 | Status: SHIPPED | OUTPATIENT
Start: 2023-10-30 | End: 2023-11-02

## 2023-10-30 NOTE — TELEPHONE ENCOUNTER
PLEASE ADVISE    PATIENT IS OUT OF RX. LOOKING TO HAVE REFILL SENT TO LOCAL PHARMACY PRIOR TO UPCOMING FU APPT.      Pharmacy: [facility] [id number] - [address] [place] [address], [address] [place] [address] Road -  [phone]

## 2023-10-30 NOTE — TELEPHONE ENCOUNTER
Patient continues to call regarding pain medication request.  Post-op from 10/22/23. Please call patient or notify staff of appropriate medication being called into pharmacy and staff will call patient.

## 2023-10-30 NOTE — TELEPHONE ENCOUNTER
Please advise. Patient underwent RADIUS AND ULNA OPEN REDUCTION INTERNAL FIXATION, IRRIGATION AND DEBRIDEMENT OPEN FRACTURE LEFT ARM 10/22/23. Post-op appointment scheduled for 11/9/23.

## 2023-10-30 NOTE — TELEPHONE ENCOUNTER
Pt called in stating he seen INNA Salazar 10/27/23 and she stated to contact the surgeon for medication     Pt stated he contacted the surgeons office and it sent him to the call center and they told the patient they have sent in a message and the pt still has not received any information     Would like a call back with what INNA Salazar suggests to do

## 2023-10-31 NOTE — TELEPHONE ENCOUNTER
Please let pt know the surgeon is the responsible person for his pain management. Please contact the surgeon for their recommendations for pain management.   Thank you   Dimas Phelan

## 2023-11-09 ENCOUNTER — OFFICE VISIT (OUTPATIENT)
Dept: ORTHOPEDIC SURGERY | Age: 33
End: 2023-11-09

## 2023-11-09 VITALS — RESPIRATION RATE: 12 BRPM | WEIGHT: 204 LBS | HEIGHT: 71 IN | BODY MASS INDEX: 28.56 KG/M2

## 2023-11-09 DIAGNOSIS — Z87.81 STATUS POST OPEN REDUCTION AND INTERNAL FIXATION (ORIF) OF FRACTURE: Primary | ICD-10-CM

## 2023-11-09 DIAGNOSIS — Z98.890 STATUS POST OPEN REDUCTION AND INTERNAL FIXATION (ORIF) OF FRACTURE: Primary | ICD-10-CM

## 2023-11-09 RX ORDER — ASCORBIC ACID 500 MG
1000 TABLET ORAL DAILY
Qty: 120 TABLET | Refills: 0 | Status: SHIPPED | OUTPATIENT
Start: 2023-11-09 | End: 2024-01-08

## 2023-11-09 RX ORDER — GABAPENTIN 100 MG/1
100 CAPSULE ORAL 2 TIMES DAILY
Qty: 60 CAPSULE | Refills: 2 | Status: SHIPPED | OUTPATIENT
Start: 2023-11-09 | End: 2024-02-07

## 2023-11-09 RX ORDER — ACETAMINOPHEN 500 MG
1000 TABLET ORAL EVERY 6 HOURS
Qty: 240 TABLET | Refills: 1 | Status: SHIPPED | OUTPATIENT
Start: 2023-11-09 | End: 2024-01-08

## 2023-11-09 NOTE — PROGRESS NOTES
History of Present Illness: George Morrell is a pleasant 34 y.o. male who presents for a post operative visit. He is 3 out following a left radius/ulna ORIF. Overall He is doing okay and feels that their pain is well controlled with current pain medications. He has been compliant with wearing the splint and weight bearing restrictions at all times. He plans to do physical therapy once cleared by us . He denies fevers, chills, numbness, tingling, and shortness of breath. Medical History:  Patient's medications, allergies, past medical, surgical, social and family histories were reviewed and updated as appropriate. No notes on file    Review of Systems  A 14 point review of systems was completed by the patient     and is available in the media section of the scanned medical record and was reviewed on 11/9/2023. Vital Signs:  Vitals:    11/09/23 1119   Resp: 12       General/Appearance: Alert and oriented and in no apparent distress. Skin:  There are no skin lesions, cellulitis, or extreme edema. The patient has warm and well-perfused Bilateral upper extremities with brisk capillary refill. Left forearm/ Wrist Exam:    Inspection: Cast  off  Staples  still in place. Mild ecchymosis and swelling are present as can be expected. There is no erythema, drainage or other signs of infection    Palpation:  No crepitus to gentle motion    Active Range of Motion: Full finger ROM, thumb extension, finger abduction, extension, and thumb apposition and flexion    Passive Range of Motion:  some pain with passive motion of the fingers    Strength:  WNL     Special Tests:  Deferred. Neurovascular: Sensation to light touch is intact, no motor deficits, palpable radial pulses 2+    Radiology:     Plain radiographs of the left rad/ulna wrist  comprising 2 views:   were obtained and reviewed in the office: Shows postsurgical changes from the   open reduction and internal fixation.   All the fixation remains in good

## 2023-11-13 ENCOUNTER — TELEPHONE (OUTPATIENT)
Dept: ORTHOPEDIC SURGERY | Age: 33
End: 2023-11-13

## 2023-11-13 DIAGNOSIS — Z98.890 STATUS POST OPEN REDUCTION AND INTERNAL FIXATION (ORIF) OF FRACTURE: Primary | ICD-10-CM

## 2023-11-13 DIAGNOSIS — Z87.81 STATUS POST OPEN REDUCTION AND INTERNAL FIXATION (ORIF) OF FRACTURE: Primary | ICD-10-CM

## 2023-11-13 NOTE — TELEPHONE ENCOUNTER
General Question     Subject: CALLING REGARDING A PRESCRIPTION CREAM THAT IS TO BE SENT TO HIS HOUSE.   Patient: Rika Dunbar  Contact Number: [phone]

## 2023-11-14 ENCOUNTER — TELEPHONE (OUTPATIENT)
Dept: ORTHOPEDIC SURGERY | Age: 33
End: 2023-11-14

## 2023-11-14 NOTE — TELEPHONE ENCOUNTER
General Question     Subject: MEDICATION CLARIFICATION  Patient and /or Facility Request: Eemlina Osbornei  Contact Number: [phone]    PHARMACY CALLED ASKING TO SPEAK WITH CLINIC REGARDING DICLOFENAC SODIUM POWDER, REQUESTING A CALL BACK FOR CLARIFICATION ON PRESCRIPTION. PLEASE CALL PT BACK AT THE ABOVE NUMBER.

## 2024-01-09 ENCOUNTER — OFFICE VISIT (OUTPATIENT)
Dept: ORTHOPEDIC SURGERY | Age: 34
End: 2024-01-09

## 2024-01-09 VITALS — RESPIRATION RATE: 12 BRPM | WEIGHT: 204 LBS | HEIGHT: 71 IN | BODY MASS INDEX: 28.56 KG/M2

## 2024-01-09 DIAGNOSIS — Z87.81 STATUS POST OPEN REDUCTION AND INTERNAL FIXATION (ORIF) OF FRACTURE: Primary | ICD-10-CM

## 2024-01-09 DIAGNOSIS — Z98.890 STATUS POST OPEN REDUCTION AND INTERNAL FIXATION (ORIF) OF FRACTURE: Primary | ICD-10-CM

## 2024-01-09 PROCEDURE — 99024 POSTOP FOLLOW-UP VISIT: CPT

## 2024-01-09 RX ORDER — CYCLOBENZAPRINE HCL 10 MG
10 TABLET ORAL NIGHTLY PRN
Qty: 10 TABLET | Refills: 0 | Status: SHIPPED | OUTPATIENT
Start: 2024-01-09 | End: 2024-01-19

## 2024-01-09 NOTE — PROGRESS NOTES
History of Present Illness:  Carson Goldstein is a pleasant 33 y.o. male who presents for a post operative visit. He is 2.5 months out following a left radius/ulna ORIF. Overall He is doing okay. He feels limitation in his ROM and occasional muscle spasms.  He has been compliant with wearing the splint and weight bearing restrictions at all times.   He denies fevers, chills, numbness, tingling, and shortness of breath.    Medical History:  Patient's medications, allergies, past medical, surgical, social and family histories were reviewed and updated as appropriate.    No notes on file    Review of Systems  A 14 point review of systems was completed by the patient     and is available in the media section of the scanned medical record and was reviewed on 1/9/2024.      Vital Signs:  Vitals:    01/09/24 1354   Resp: 12       General/Appearance: Alert and oriented and in no apparent distress.    Skin:  There are no skin lesions, cellulitis, or extreme edema. The patient has warm and well-perfused Bilateral upper extremities with brisk capillary refill.      Left forearm/ Wrist Exam:    Inspection: Incisions well healed. There is no erythema, drainage or other signs of infection    Palpation:  No crepitus to gentle motion    Active Range of Motion: Full finger ROM, thumb extension, finger abduction, extension, and thumb apposition and flexion    Passive Range of Motion:  Full passive ROM with some discomfort.     Strength:  4+/5  strength, 4+/5 wrist extension/flexion    Special Tests:  Deferred.    Neurovascular: Sensation to light touch is intact, no motor deficits, palpable radial pulses 2+    Radiology:     Plain radiographs of the left rad/ulna wrist  comprising 2 views:   were obtained and reviewed in the office: Shows postsurgical changes from the   open reduction and internal fixation.  All the fixation remains in good placement without any signs of loosening, fractures, or hardware penetration.     Impression:

## 2024-01-16 ENCOUNTER — HOSPITAL ENCOUNTER (OUTPATIENT)
Dept: OCCUPATIONAL THERAPY | Age: 34
Setting detail: THERAPIES SERIES
Discharge: HOME | End: 2024-01-16
Payer: COMMERCIAL

## 2024-01-16 PROCEDURE — 97112 NEUROMUSCULAR REEDUCATION: CPT | Performed by: OCCUPATIONAL THERAPIST

## 2024-01-16 PROCEDURE — 97022 WHIRLPOOL THERAPY: CPT | Performed by: OCCUPATIONAL THERAPIST

## 2024-01-16 PROCEDURE — 97165 OT EVAL LOW COMPLEX 30 MIN: CPT | Performed by: OCCUPATIONAL THERAPIST

## 2024-01-16 NOTE — FLOWSHEET NOTE
[facility]- Outpatient Rehabilitation and Therapy [address], [place] [address] office: [phone] fax: [phone]      Physical Therapy: TREATMENT/PROGRESS NOTE   Patient: Carson Goldstein (33 y.o. male)   Treatment Date: 2024   :  1990 MRN: [id number]   Visit #: Visit count could not be calculated. Make sure you are using a visit which is associated with an episode.   Insurance Allowable Auth Needed   ? []Yes    []No    Insurance: Payor: [employer] / Plan: [employer] [place] MEDICAID / Product Type: *No Product type* /   Insurance ID: [id number] - (Medicaid Managed)  Secondary Insurance (if applicable):    Treatment Diagnosis:   L wrist pain M25.531   Medical Diagnosis:    Z98.890, Z87.81 (ICD-10-CM) - Status post open reduction and internal fixation (ORIF) of fracture      Referring Physician: Summer Carr PA  PCP: Haylee Becker APRN - [place]                             Plan of care signed (Y/N):     Date of Patient follow up with Physician:      Progress Report/POC: EVAL today  POC update due: (10 visits /OR AUTH LIMITS, whichever is less)    2/15/2024     Precautions/ Contra-indications:   Latex allergy:   No  Pacemaker:     No  Othe    Preferred Language for Healthcare:   [x]English       []other:    SUBJECTIVE EXAMINATION     Patient Report/Comments: see eval     Test used Initial score  2024   Pain Summary VAS 3-6/10    Functional questionnaire Quick DASH   75%    Other:                OBJECTIVE EXAMINATION     Observation:     Test measurements: see eval    Exercises/Interventions:     Therapeutic Ex (79534)                                                                              Manual Intervention (06101)                                          NMR re-education (91961)        Pt educ HEP A/PROM forearm and wrist                                  Therapeutic Activity (97869)                              Modalities: Fluido

## 2024-01-16 NOTE — PLAN OF CARE
presentation with stable and/or uncomplicated characteristics; no personal factors and/or comorbidities that impact the plan of care  [x] Clinical decision making of LOW (07703 - Typically 20 minutes face-to-face) complexity using standardized patient assessment instrument and/or measurable assessment of functional outcome.    GOALS     Patient stated goal: \"To get the use of my hand back\"    [] Progressing: [] Met: [] Not Met: [] Adjusted    Therapist goals for Patient:   Short Term Goals: To be achieved in: 2 weeks  1. Independent in HEP and progression per patient tolerance, in order to prevent re-injury.     [] Progressing: [] Met: [] Not Met: [] Adjusted   2. Patient will have a decrease in pain to facilitate improvement in movement, function, and ADLs as indicated by Functional Deficits.    [] Progressing: [] Met: [] Not Met: [] Adjusted    Long Term Goals to be achieved in 8 weeks (through 3/16/24), including patient directed goals to address patient identified performance deficits:  1) Pt to be independent in graded HEP progression with a good level of effort and compliance.  [] Progressing: [] Met: [] Not Met: [] Adjusted   2) Pt to report a score of </= 25 % on the Quick DASH disability questionnaire for increased performance with carrying, moving, and handling objects.  [] Progressing: [] Met: [] Not Met: [] Adjusted   3) Pt will demonstrate increased ROM to WFL for improved independence with self care tasks, home mgt tasks, recreational endeavors, and vocational tasks such as manipulating hand tools and power tools. .  [] Progressing: [] Met: [] Not Met: [] Adjusted   4) Pt will demonstrate increased strength to at least 50# for improved independence with self care tasks, home mgt tasks, recreational endeavors, and vocational tasks such as manipulating hand tools and power tools. ..  [] Progressing: [] Met: [] Not Met: [] Adjusted   5) Pt will have a decrease in pain to 2/10 to facilitate self care

## 2024-01-17 RX ORDER — CEPHALEXIN 500 MG/1
CAPSULE ORAL
Qty: 21 CAPSULE | OUTPATIENT
Start: 2024-01-17

## 2024-01-18 RX ORDER — CYCLOBENZAPRINE HCL 10 MG
TABLET ORAL
Qty: 21 TABLET | Refills: 0 | Status: SHIPPED | OUTPATIENT
Start: 2024-01-18

## 2024-01-18 RX ORDER — CEPHALEXIN 500 MG/1
CAPSULE ORAL
Qty: 21 CAPSULE | OUTPATIENT
Start: 2024-01-18

## 2024-01-18 RX ORDER — MULTIVIT WITH MINERALS/LUTEIN
1000 TABLET ORAL DAILY
Qty: 60 TABLET | Refills: 2 | Status: SHIPPED | OUTPATIENT
Start: 2024-01-18

## 2024-01-29 ENCOUNTER — HOSPITAL ENCOUNTER (OUTPATIENT)
Dept: OCCUPATIONAL THERAPY | Age: 34
Setting detail: THERAPIES SERIES
Discharge: HOME | End: 2024-01-29
Payer: COMMERCIAL

## 2024-01-29 PROCEDURE — 97140 MANUAL THERAPY 1/> REGIONS: CPT | Performed by: OCCUPATIONAL THERAPIST

## 2024-01-29 PROCEDURE — 97022 WHIRLPOOL THERAPY: CPT | Performed by: OCCUPATIONAL THERAPIST

## 2024-01-29 PROCEDURE — 97110 THERAPEUTIC EXERCISES: CPT | Performed by: OCCUPATIONAL THERAPIST

## 2024-01-29 NOTE — FLOWSHEET NOTE
[facility]- Outpatient Rehabilitation and Therapy [address], [address], [place] [address] office: [phone] fax: [phone]      Physical Therapy: TREATMENT/PROGRESS NOTE   Patient: Carson Goldstein (33 y.o. male)   Treatment Date: 2024   :  1990 MRN: [id number]   Visit #: Visit count could not be calculated. Make sure you are using a visit which is associated with an episode.   Insurance Allowable Auth Needed   ? []Yes    []No    Insurance: Payor: [employer] / Plan: [employer] [place] MEDICAID / Product Type: *No Product type* /   Insurance ID: [id number] - (Medicaid Managed)  Secondary Insurance (if applicable):    Treatment Diagnosis:   L wrist pain M25.531   Medical Diagnosis:    Z98.890, Z87.81 (ICD-10-CM) - Status post open reduction and internal fixation (ORIF) of fracture      Referring Physician: Johan Payan MD  PCP: Haylee Becker APRN - [place]                             Plan of care signed (Y/N):     Date of Patient follow up with Physician:      Progress Report/POC: EVAL today  POC update due: (10 visits /OR AUTH LIMITS, whichever is less)    2024     Precautions/ Contra-indications:   Latex allergy:   No  Pacemaker:     No  Othe    Preferred Language for Healthcare:   [x]English       []other:    SUBJECTIVE EXAMINATION     Patient Report/Comments: Pt reports that he is making good progress with motion and decreased pain     Test used Initial score  2024   Pain Summary VAS 3-6/10    Functional questionnaire Quick DASH   75%    Other:                OBJECTIVE EXAMINATION         Objective Measures:      PAIN 3-6/10 4-5/10   Quick DASH %    Digits tip to DPFC in cm WFL    Thumb ROM MP  IP    Thumb opposition  R:  L:    Thumb Radial/Palmar abd ROM R:  L:    Wrist ROM Ext/Flex R:  L:57/35   70/60   Rad/Uln dev ROM R:  L:    Forearm ROM  Sup/pron R:  L:   30/61   Elbow ROM Ext/flex R:  L:    Shoulder Flex  Shoulder Abd  Shoulder IR/ER

## 2024-01-31 ENCOUNTER — HOSPITAL ENCOUNTER (OUTPATIENT)
Dept: OCCUPATIONAL THERAPY | Age: 34
Setting detail: THERAPIES SERIES
Discharge: HOME | End: 2024-01-31
Payer: COMMERCIAL

## 2024-01-31 PROCEDURE — 97140 MANUAL THERAPY 1/> REGIONS: CPT | Performed by: OCCUPATIONAL THERAPIST

## 2024-01-31 PROCEDURE — 97110 THERAPEUTIC EXERCISES: CPT | Performed by: OCCUPATIONAL THERAPIST

## 2024-01-31 PROCEDURE — 97035 APP MDLTY 1+ULTRASOUND EA 15: CPT | Performed by: OCCUPATIONAL THERAPIST

## 2024-01-31 PROCEDURE — 97022 WHIRLPOOL THERAPY: CPT | Performed by: OCCUPATIONAL THERAPIST

## 2024-02-05 ENCOUNTER — HOSPITAL ENCOUNTER (OUTPATIENT)
Dept: OCCUPATIONAL THERAPY | Age: 34
Setting detail: THERAPIES SERIES
Discharge: HOME | End: 2024-02-05
Payer: COMMERCIAL

## 2024-02-05 PROCEDURE — 97140 MANUAL THERAPY 1/> REGIONS: CPT | Performed by: OCCUPATIONAL THERAPIST

## 2024-02-05 PROCEDURE — 97022 WHIRLPOOL THERAPY: CPT | Performed by: OCCUPATIONAL THERAPIST

## 2024-02-05 PROCEDURE — 97110 THERAPEUTIC EXERCISES: CPT | Performed by: OCCUPATIONAL THERAPIST

## 2024-02-05 NOTE — FLOWSHEET NOTE
[facility]- Outpatient Rehabilitation and Therapy [address], [address], [place] [address] office: [phone] fax: [phone]      Physical Therapy: TREATMENT/PROGRESS NOTE   Patient: Carson Goldstein (33 y.o. male)   Treatment Date: 2024   :  1990 MRN: [id number]   Visit #: Visit count could not be calculated. Make sure you are using a visit which is associated with an episode.   Insurance Allowable Auth Needed   ? []Yes    []No    Insurance: Payor: [employer] / Plan: [employer] [place] MEDICAID / Product Type: *No Product type* /   Insurance ID: [id number] - (Medicaid Managed)  Secondary Insurance (if applicable):    Treatment Diagnosis:   L wrist pain M25.531   Medical Diagnosis:    Z98.890, Z87.81 (ICD-10-CM) - Status post open reduction and internal fixation (ORIF) of fracture      Referring Physician: Johan Payan MD  PCP: Haylee Becker APRN - CNP         Date of Injury:10/23/24     Date and type of Surgery:10/24/23                          Plan of care signed (Y/N):     Date of Patient follow up with Physician:      Progress Report/POC: EVAL today  POC update due: (10 visits /OR AUTH LIMITS, whichever is less)    3/6/2024     Precautions/ Contra-indications:   Latex allergy:   No  Pacemaker:     No  Othe    Preferred Language for Healthcare:   [x]English       []other:    SUBJECTIVE EXAMINATION     Patient Report/Comments: Pt reports tightness at dorsal wrist and hand     Test used Initial score  2024   Pain Summary VAS 3-6/10    Functional questionnaire Quick DASH   75%    Other:                OBJECTIVE EXAMINATION         Objective Measures:   24   PAIN 3-6/10 4-5/10    Quick DASH %     Digits tip to DPFC in cm WFL     Thumb ROM MP  IP     Thumb opposition  R:  L:     Thumb Radial/Palmar abd ROM R:  L:     Wrist ROM Ext/Flex R:  L:57/35   70/60    Rad/Uln dev ROM R:  L:     Forearm ROM  Sup/pron R:  L:   30 42/70   Elbow ROM

## 2024-02-07 ENCOUNTER — APPOINTMENT (OUTPATIENT)
Dept: OCCUPATIONAL THERAPY | Age: 34
End: 2024-02-07
Payer: COMMERCIAL

## 2024-02-12 ENCOUNTER — HOSPITAL ENCOUNTER (OUTPATIENT)
Dept: OCCUPATIONAL THERAPY | Age: 34
Setting detail: THERAPIES SERIES
Discharge: HOME | End: 2024-02-12
Payer: COMMERCIAL

## 2024-02-12 PROCEDURE — 97140 MANUAL THERAPY 1/> REGIONS: CPT | Performed by: OCCUPATIONAL THERAPIST

## 2024-02-12 PROCEDURE — 97035 APP MDLTY 1+ULTRASOUND EA 15: CPT | Performed by: OCCUPATIONAL THERAPIST

## 2024-02-12 PROCEDURE — 97110 THERAPEUTIC EXERCISES: CPT | Performed by: OCCUPATIONAL THERAPIST

## 2024-02-12 PROCEDURE — 97112 NEUROMUSCULAR REEDUCATION: CPT | Performed by: OCCUPATIONAL THERAPIST

## 2024-02-12 NOTE — FLOWSHEET NOTE
[facility]- Outpatient Rehabilitation and Therapy [address], [address], [place] [address] office: [phone] fax: [phone]      Physical Therapy: TREATMENT/PROGRESS NOTE   Patient: Carson Goldstein (33 y.o. male)   Treatment Date: 2024   :  1990 MRN: [id number]   Visit #: Visit count could not be calculated. Make sure you are using a visit which is associated with an episode.   Insurance Allowable Auth Needed   ? []Yes    []No    Insurance: Payor: [employer] / Plan: [employer] [place] MEDICAID / Product Type: *No Product type* /   Insurance ID: [id number] - (Medicaid Managed)  Secondary Insurance (if applicable):    Treatment Diagnosis:   L wrist pain M25.531   Medical Diagnosis:    Z98.890, Z87.81 (ICD-10-CM) - Status post open reduction and internal fixation (ORIF) of fracture      Referring Physician: Johan Payan MD  PCP: Haylee Becker APRN - CNP         Date of Injury:10/23/24     Date and type of Surgery:10/24/23                          Plan of care signed (Y/N):     Date of Patient follow up with Physician:      Progress Report/POC: EVAL today  POC update due: (10 visits /OR AUTH LIMITS, whichever is less)    3/13/2024     Precautions/ Contra-indications:   Latex allergy:   No  Pacemaker:     No  Othe    Preferred Language for Healthcare:   [x]English       []other:    SUBJECTIVE EXAMINATION     Patient Report/Comments: Pt reports tightness at dorsal wrist and hand     Test used Initial score  2024   Pain Summary VAS 3-6/10    Functional questionnaire Quick DASH   75%    Other:                OBJECTIVE EXAMINATION         Objective Measures:   24   PAIN 3-6/10 4-5/10    Quick DASH %     Digits tip to DPFC in cm WFL     Thumb ROM MP  IP     Thumb opposition  R:  L:     Thumb Radial/Palmar abd ROM R:  L:     Wrist ROM Ext/Flex R:  L:57/35   70/60    Rad/Uln dev ROM R:  L:     Forearm ROM  Sup/pron R:  L:   30 42/70   Elbow ROM

## 2024-02-14 ENCOUNTER — HOSPITAL ENCOUNTER (OUTPATIENT)
Dept: OCCUPATIONAL THERAPY | Age: 34
Setting detail: THERAPIES SERIES
Discharge: HOME | End: 2024-02-14
Payer: COMMERCIAL

## 2024-02-14 PROCEDURE — 97110 THERAPEUTIC EXERCISES: CPT | Performed by: OCCUPATIONAL THERAPIST

## 2024-02-14 PROCEDURE — 97022 WHIRLPOOL THERAPY: CPT | Performed by: OCCUPATIONAL THERAPIST

## 2024-02-14 PROCEDURE — 97035 APP MDLTY 1+ULTRASOUND EA 15: CPT | Performed by: OCCUPATIONAL THERAPIST

## 2024-02-14 PROCEDURE — 97140 MANUAL THERAPY 1/> REGIONS: CPT | Performed by: OCCUPATIONAL THERAPIST

## 2024-02-14 NOTE — FLOWSHEET NOTE
in the patients goals  The patient has a musculoskeletal condition(s) with a corresponding ICD-10 code that is of complexity and severity that require skilled therapeutic intervention. This has a direct and significant impact on the need for therapy and significantly impacts the rate of recovery.     Treatment/Activity Tolerance:  [x] Patient tolerated treatment well [] Patient limited by fatique  [] Patient limited by pain  [] Patient limited by other medical complications  [] Other:     Return to Play: NA    Prognosis for POC: [x] Good [] Fair  [] Poor    Patient requires continued skilled intervention: [x] Yes  [] No      GOALS     Patient stated goal: \"To get the use of my hand back\"    [] Progressing: [] Met: [] Not Met: [] Adjusted     Therapist goals for Patient:   Short Term Goals: To be achieved in: 2 weeks  1. Independent in HEP and progression per patient tolerance, in order to prevent re-injury.     [] Progressing: [] Met: [] Not Met: [] Adjusted   2. Patient will have a decrease in pain to facilitate improvement in movement, function, and ADLs as indicated by Functional Deficits.    [] Progressing: [] Met: [] Not Met: [] Adjusted     Long Term Goals to be achieved in 8 weeks (through 3/16/24), including patient directed goals to address patient identified performance deficits:  1) Pt to be independent in graded HEP progression with a good level of effort and compliance.  [] Progressing: [] Met: [] Not Met: [] Adjusted   2) Pt to report a score of </= 25 % on the Quick DASH disability questionnaire for increased performance with carrying, moving, and handling objects.  [] Progressing: [] Met: [] Not Met: [] Adjusted   3) Pt will demonstrate increased ROM to WFL for improved independence with self care tasks, home mgt tasks, recreational endeavors, and vocational tasks such as manipulating hand tools and power tools. .  [] Progressing: [] Met: [] Not Met: [] Adjusted   4) Pt will demonstrate increased

## 2024-02-18 SDOH — HEALTH STABILITY: PHYSICAL HEALTH: ON AVERAGE, HOW MANY MINUTES DO YOU ENGAGE IN EXERCISE AT THIS LEVEL?: 30 MIN

## 2024-02-18 SDOH — HEALTH STABILITY: PHYSICAL HEALTH: ON AVERAGE, HOW MANY DAYS PER WEEK DO YOU ENGAGE IN MODERATE TO STRENUOUS EXERCISE (LIKE A BRISK WALK)?: 3 DAYS

## 2024-02-19 ENCOUNTER — OFFICE VISIT (OUTPATIENT)
Dept: FAMILY MEDICINE CLINIC | Age: 34
End: 2024-02-19
Payer: COMMERCIAL

## 2024-02-19 VITALS
WEIGHT: 212.2 LBS | OXYGEN SATURATION: 97 % | SYSTOLIC BLOOD PRESSURE: 165 MMHG | HEIGHT: 70 IN | DIASTOLIC BLOOD PRESSURE: 103 MMHG | HEART RATE: 83 BPM | BODY MASS INDEX: 30.38 KG/M2

## 2024-02-19 DIAGNOSIS — I10 ESSENTIAL HYPERTENSION: Primary | ICD-10-CM

## 2024-02-19 DIAGNOSIS — G43.019 INTRACTABLE MIGRAINE WITHOUT AURA AND WITHOUT STATUS MIGRAINOSUS: ICD-10-CM

## 2024-02-19 DIAGNOSIS — K21.9 GASTROESOPHAGEAL REFLUX DISEASE, UNSPECIFIED WHETHER ESOPHAGITIS PRESENT: ICD-10-CM

## 2024-02-19 PROCEDURE — 3077F SYST BP >= 140 MM HG: CPT | Performed by: SURGERY

## 2024-02-19 PROCEDURE — G8427 DOCREV CUR MEDS BY ELIG CLIN: HCPCS | Performed by: SURGERY

## 2024-02-19 PROCEDURE — 4004F PT TOBACCO SCREEN RCVD TLK: CPT | Performed by: SURGERY

## 2024-02-19 PROCEDURE — G8419 CALC BMI OUT NRM PARAM NOF/U: HCPCS | Performed by: SURGERY

## 2024-02-19 PROCEDURE — G8484 FLU IMMUNIZE NO ADMIN: HCPCS | Performed by: SURGERY

## 2024-02-19 PROCEDURE — 3080F DIAST BP >= 90 MM HG: CPT | Performed by: SURGERY

## 2024-02-19 PROCEDURE — 99204 OFFICE O/P NEW MOD 45 MIN: CPT | Performed by: SURGERY

## 2024-02-19 RX ORDER — GABAPENTIN 100 MG/1
100 CAPSULE ORAL 2 TIMES DAILY
Qty: 60 CAPSULE | Refills: 2 | OUTPATIENT
Start: 2024-02-19

## 2024-02-19 RX ORDER — OMEPRAZOLE 20 MG/1
20 CAPSULE, DELAYED RELEASE ORAL
Qty: 30 CAPSULE | Refills: 0 | Status: SHIPPED | OUTPATIENT
Start: 2024-02-19

## 2024-02-19 RX ORDER — LOSARTAN POTASSIUM AND HYDROCHLOROTHIAZIDE 12.5; 5 MG/1; MG/1
1 TABLET ORAL DAILY
Qty: 30 TABLET | Refills: 0 | Status: SHIPPED | OUTPATIENT
Start: 2024-02-19

## 2024-02-19 RX ORDER — CEPHALEXIN 500 MG/1
CAPSULE ORAL
Qty: 21 CAPSULE | OUTPATIENT
Start: 2024-02-19

## 2024-02-19 RX ORDER — CYCLOBENZAPRINE HCL 10 MG
TABLET ORAL
Qty: 21 TABLET | Refills: 0 | Status: SHIPPED | OUTPATIENT
Start: 2024-02-19

## 2024-02-19 ASSESSMENT — ENCOUNTER SYMPTOMS
SORE THROAT: 0
SHORTNESS OF BREATH: 0
COUGH: 0
DIARRHEA: 0
NAUSEA: 0
CONSTIPATION: 0
ABDOMINAL PAIN: 0

## 2024-02-19 ASSESSMENT — PATIENT HEALTH QUESTIONNAIRE - PHQ9
2. FEELING DOWN, DEPRESSED OR HOPELESS: 0
1. LITTLE INTEREST OR PLEASURE IN DOING THINGS: 0
SUM OF ALL RESPONSES TO PHQ QUESTIONS 1-9: 0
SUM OF ALL RESPONSES TO PHQ9 QUESTIONS 1 & 2: 0
SUM OF ALL RESPONSES TO PHQ QUESTIONS 1-9: 0

## 2024-02-19 NOTE — PROGRESS NOTES
2/19/2024    This is a 33 y.o. male   Chief Complaint   Patient presents with    Established New Doctor     HTN meds not  working, itching in the throat x 4 months acid reflux x ongoing    .    Here to establish care.    He has had high blood pressure since he was 20 occasions in the past he says he does not feel lisinopril has ever really worked well for him.  He does check blood pressures at at home he says his low number is 140 over around 93 but not really ever lower than that.    He also gets migraines typically once a week.  He gets no aura but does get photophobia nausea and sometimes vomiting.  Sumatriptan works pretty well most of the time.  He has had these ever since he was a child.    E also notes itching sensation in his throat. He thinks possibly this is from damage done when he used to snort drugs. He has not used drugs for a few years now and the itching only started in the past 4 months or so.     He does get reflux and has been using famotidine PRN, additionally his girlfriend notes he is snoring more than he used to. His weight is up ome since decreased activity due to arm fracture and repair. He plans to address this now that he is more functional again with his arm.          Past Medical History:   Diagnosis Date    Hypertension     Migraines        Past Surgical History:   Procedure Laterality Date    FOREARM SURGERY Left 10/22/2023    RADIUS AND ULNA OPEN REDUCTION INTERNAL FIXATION, IRRIGATION AND DEBRIDEMENT OPEN FRACTURE LEFT ARM performed by Johan Payan MD at [facility] OR    NOSE SURGERY         Social History     Socioeconomic History    Marital status: Single     Spouse name: Not on file    Number of children: Not on file    Years of education: Not on file    Highest education level: Not on file   Occupational History    Not on file   Tobacco Use    Smoking status: Former     Current packs/day: 1.00     Average packs/day: 0.5 packs/day for 16.1 years (8.1 ttl pk-yrs)     Types:

## 2024-02-21 ENCOUNTER — HOSPITAL ENCOUNTER (OUTPATIENT)
Dept: OCCUPATIONAL THERAPY | Age: 34
Setting detail: THERAPIES SERIES
Discharge: HOME | End: 2024-02-21
Payer: COMMERCIAL

## 2024-02-21 DIAGNOSIS — Z98.890 STATUS POST OPEN REDUCTION AND INTERNAL FIXATION (ORIF) OF FRACTURE: Primary | ICD-10-CM

## 2024-02-21 DIAGNOSIS — S62.102D CLOSED FRACTURE OF LEFT WRIST WITH ROUTINE HEALING, SUBSEQUENT ENCOUNTER: ICD-10-CM

## 2024-02-21 DIAGNOSIS — Z87.81 STATUS POST OPEN REDUCTION AND INTERNAL FIXATION (ORIF) OF FRACTURE: Primary | ICD-10-CM

## 2024-02-21 PROCEDURE — 97035 APP MDLTY 1+ULTRASOUND EA 15: CPT | Performed by: OCCUPATIONAL THERAPIST

## 2024-02-21 PROCEDURE — 97022 WHIRLPOOL THERAPY: CPT | Performed by: OCCUPATIONAL THERAPIST

## 2024-02-21 PROCEDURE — 97140 MANUAL THERAPY 1/> REGIONS: CPT | Performed by: OCCUPATIONAL THERAPIST

## 2024-02-21 PROCEDURE — 97110 THERAPEUTIC EXERCISES: CPT | Performed by: OCCUPATIONAL THERAPIST

## 2024-02-21 RX ORDER — GABAPENTIN 100 MG/1
100 CAPSULE ORAL 2 TIMES DAILY
Qty: 60 CAPSULE | Refills: 2 | OUTPATIENT
Start: 2024-02-21 | End: 2024-05-21

## 2024-02-21 NOTE — FLOWSHEET NOTE
[facility]- Outpatient Rehabilitation and Therapy [address], [place] [address] office: [phone] fax: [phone]      Physical Therapy: TREATMENT/PROGRESS NOTE   Patient: Carson Goldstein (33 y.o. male)   Treatment Date: 2024   :  1990 MRN: [id number]   Visit #: Visit count could not be calculated. Make sure you are using a visit which is associated with an episode.   Insurance Allowable Auth Needed   ? []Yes    []No    Insurance: Payor: [employer] / Plan: [employer] [place] MEDICAID / Product Type: *No Product type* /   Insurance ID: [id number] - (Medicaid Managed)  Secondary Insurance (if applicable):    Treatment Diagnosis:   L wrist pain M25.531   Medical Diagnosis:    Z98.890, Z87.81 (ICD-10-CM) - Status post open reduction and internal fixation (ORIF) of fracture      Referring Physician: Johan Payan MD  PCP: Mary Ann Aparicio DO         Date of Injury:10/23/24     Date and type of Surgery:10/24/23                          Plan of care signed (Y/N):     Date of Patient follow up with Physician:      Progress Report/POC: EVAL today  POC update due: (10 visits /OR AUTH LIMITS, whichever is less)    3/22/2024     Precautions/ Contra-indications:   Latex allergy:   No  Pacemaker:     No  Othe    Preferred Language for Healthcare:   [x]English       []other:    SUBJECTIVE EXAMINATION     Patient Report/Comments: Pt reports tightness at dorsal wrist and hand.  Received order today for dry needling. Will address next session     Test used Initial score  2024   Pain Summary VAS 3-6/10    Functional questionnaire Quick DASH   75%    Other:                OBJECTIVE EXAMINATION         Objective Measures:   24   PAIN 3-6/10 4-5/10     Quick DASH %      Digits tip to DPFC in cm WFL      Thumb ROM MP  IP      Thumb opposition  R:  L:      Thumb Radial/Palmar abd ROM R:  L:      Wrist ROM Ext/Flex R:  L:57/35   70/60     Rad/Uln dev

## 2024-03-04 ENCOUNTER — HOSPITAL ENCOUNTER (OUTPATIENT)
Dept: OCCUPATIONAL THERAPY | Age: 34
Setting detail: THERAPIES SERIES
Discharge: HOME | End: 2024-03-04
Payer: COMMERCIAL

## 2024-03-04 PROCEDURE — 97035 APP MDLTY 1+ULTRASOUND EA 15: CPT | Performed by: OCCUPATIONAL THERAPIST

## 2024-03-04 PROCEDURE — 97140 MANUAL THERAPY 1/> REGIONS: CPT | Performed by: OCCUPATIONAL THERAPIST

## 2024-03-04 PROCEDURE — 97022 WHIRLPOOL THERAPY: CPT | Performed by: OCCUPATIONAL THERAPIST

## 2024-03-04 PROCEDURE — 97110 THERAPEUTIC EXERCISES: CPT | Performed by: OCCUPATIONAL THERAPIST

## 2024-03-04 NOTE — FLOWSHEET NOTE
[facility]- Outpatient Rehabilitation and Therapy [address], [place] [address] office: [phone] fax: [phone]      Physical Therapy: TREATMENT/PROGRESS NOTE   Patient: Carson Goldstein (33 y.o. male)   Treatment Date: 2024   :  1990 MRN: [id number]   Visit #: Visit count could not be calculated. Make sure you are using a visit which is associated with an episode.   Insurance Allowable Auth Needed   ? []Yes    []No    Insurance: Payor: [employer] / Plan: [employer] [place] MEDICAID / Product Type: *No Product type* /   Insurance ID: [id number] - (Medicaid Managed)  Secondary Insurance (if applicable):    Treatment Diagnosis:   L wrist pain M25.531   Medical Diagnosis:    Z98.890, Z87.81 (ICD-10-CM) - Status post open reduction and internal fixation (ORIF) of fracture      Referring Physician: Johan Payan MD  PCP: Mary Ann Aparicio DO         Date of Injury:10/23/24     Date and type of Surgery:10/24/23                          Plan of care signed (Y/N):     Date of Patient follow up with Physician:      Progress Report/POC: EVAL today  POC update due: (10 visits /OR AUTH LIMITS, whichever is less)    4/3/2024     Precautions/ Contra-indications:   Latex allergy:   No  Pacemaker:     No  Othe    Preferred Language for Healthcare:   [x]English       []other:    SUBJECTIVE EXAMINATION     Patient Report/Comments: Pt reports tightness at dorsal wrist and hand.  Received order today for dry needling. Will address next session     Test used Initial score  2024   Pain Summary VAS 3-6/10    Functional questionnaire Quick DASH   75%    Other:                OBJECTIVE EXAMINATION         Objective Measures:   1/29/24 2/5/24 2/21/24 3/4/24   PAIN 3-610 -5/10      Quick DASH %       Digits tip to DPFC in cm WFL       Thumb ROM MP  IP       Thumb opposition  R:  L:       Thumb Radial/Palmar abd ROM R:  L:       Wrist ROM Ext/Flex R:  L:57/35   70/60

## 2024-03-06 ENCOUNTER — HOSPITAL ENCOUNTER (OUTPATIENT)
Dept: OCCUPATIONAL THERAPY | Age: 34
Setting detail: THERAPIES SERIES
Discharge: HOME | End: 2024-03-06
Payer: COMMERCIAL

## 2024-03-06 PROCEDURE — 97022 WHIRLPOOL THERAPY: CPT | Performed by: OCCUPATIONAL THERAPIST

## 2024-03-06 PROCEDURE — 97035 APP MDLTY 1+ULTRASOUND EA 15: CPT | Performed by: OCCUPATIONAL THERAPIST

## 2024-03-06 PROCEDURE — 20560 NDL INSJ W/O NJX 1 OR 2 MUSC: CPT | Performed by: OCCUPATIONAL THERAPIST

## 2024-03-06 PROCEDURE — 97140 MANUAL THERAPY 1/> REGIONS: CPT | Performed by: OCCUPATIONAL THERAPIST

## 2024-03-06 PROCEDURE — 97110 THERAPEUTIC EXERCISES: CPT | Performed by: OCCUPATIONAL THERAPIST

## 2024-03-06 NOTE — FLOWSHEET NOTE
[facility]- Outpatient Rehabilitation and Therapy [address], [address], [place] [address] office: [phone] fax: [phone]      Physical Therapy: TREATMENT/PROGRESS NOTE   Patient: Carson Goldstein (33 y.o. male)   Treatment Date: 2024   :  1990 MRN: [id number]   Visit #: Visit count could not be calculated. Make sure you are using a visit which is associated with an episode.   Insurance Allowable Auth Needed   ? []Yes    []No    Insurance: Payor: [employer] / Plan: [employer] [place] MEDICAID / Product Type: *No Product type* /   Insurance ID: [id number] - (Medicaid Managed)  Secondary Insurance (if applicable):    Treatment Diagnosis:   L wrist pain M25.531   Medical Diagnosis:    Z98.890, Z87.81 (ICD-10-CM) - Status post open reduction and internal fixation (ORIF) of fracture      Referring Physician: Johan Payan MD  PCP: Mary Ann Aparicio DO         Date of Injury:10/23/24     Date and type of Surgery:10/24/23                          Plan of care signed (Y/N):     Date of Patient follow up with Physician:      Progress Report/POC: EVAL today  POC update due: (10 visits /OR AUTH LIMITS, whichever is less)    2024     Precautions/ Contra-indications:   Latex allergy:   No  Pacemaker:     No  Othe    Preferred Language for Healthcare:   [x]English       []other:    SUBJECTIVE EXAMINATION     Patient Report/Comments: Pt reports tightness in L forearm. Pt reports being eager to try dry needling today.     Test used Initial score  2024   Pain Summary VAS 3-6/10    Functional questionnaire Quick DASH   75%    Other:                OBJECTIVE EXAMINATION         Objective Measures:   1/29/24 2/5/24 2/21/24 3/4/24    PAIN 3-6/10 4-5/10       Quick DASH %        Digits tip to DPFC in cm WFL        Thumb ROM MP  IP        Thumb opposition  R:  L:        Thumb Radial/Palmar abd ROM R:  L:        Wrist ROM Ext/Flex R:  L:57/35   70/60       Rad/Uln dev ROM

## 2024-03-11 ENCOUNTER — HOSPITAL ENCOUNTER (OUTPATIENT)
Dept: OCCUPATIONAL THERAPY | Age: 34
Setting detail: THERAPIES SERIES
End: 2024-03-11
Payer: COMMERCIAL

## 2024-03-13 ENCOUNTER — HOSPITAL ENCOUNTER (OUTPATIENT)
Dept: OCCUPATIONAL THERAPY | Age: 34
Setting detail: THERAPIES SERIES
Discharge: HOME | End: 2024-03-13
Payer: COMMERCIAL

## 2024-03-13 PROCEDURE — 97022 WHIRLPOOL THERAPY: CPT | Performed by: OCCUPATIONAL THERAPIST

## 2024-03-13 PROCEDURE — 97140 MANUAL THERAPY 1/> REGIONS: CPT | Performed by: OCCUPATIONAL THERAPIST

## 2024-03-13 PROCEDURE — 20560 NDL INSJ W/O NJX 1 OR 2 MUSC: CPT | Performed by: OCCUPATIONAL THERAPIST

## 2024-03-13 PROCEDURE — 97110 THERAPEUTIC EXERCISES: CPT | Performed by: OCCUPATIONAL THERAPIST

## 2024-03-13 NOTE — FLOWSHEET NOTE
dev ROM R:  L:        Forearm ROM  Sup/pron R:  L:19/61   30/61 42/70 60/78 69/70 68/72   Elbow ROM Ext/flex R:  L:        Shoulder Flex  Shoulder Abd  Shoulder IR/ER          Edema in cm circumf.  MCPJs R:  L:        Edema in cm circumf.  Wrist R:  L:         strength in lbs R:107#  L:42#     L 66#     Pinch Strengthin lbs: lat  R:  L:        Pinch Strength in lbs:  3 point R:  L:           MMT:           Observations:  (including splints, bandages, incisions, scars):              Exercises/Interventions:     Therapeutic Ex (15510)  2/12/24 2/14/24 2/21/24 3/4/24 3/6/24 3/13/24                               Yellow putty  Mallet, pulling, rolling, pinching  Yellow putty  Mallet, pulling, rolling, pinching Yellow putty  Mallet, pulling, rolling, pinching Yellow putty  Mallet, pulling, rolling, pinching              #5  6' #6  6' 6 #7  #8 6 6' #6    Green flex bar  Forearm and wrist x 25 ea direction Green flex bar  Forearm and wrist x 25 ea direction          Mallet sup/pron Mallet sup/pron Mallet sup/pron Mallet sup/pron    Pitch back #2 x 100 Pitch back #2 x 100  #4 x 100              Manual Intervention (03070)          STM, DTM, PROM STM, DTM, PROM STM, DTM, PROM; jt mob STM, DTM, PROM; jt mob STM, DTM, PROM; jt mob STM, DTM, PROM; jt mob                          DN  DN            NMR re-education (09762)          Verbal and tactile cues                                            Therapeutic Activity (43300)                                      Modalities:  fluido fluido fluido fluido fluido    8'     8' 8' 8' 8' 8'       Spoke with   regarding the use of Dry Needling     Dry needling manual therapy: consisted on the placement of 3 needles in the following muscles:  prox forearm extensor wad..  A 40 mm needle was inserted, piston, rotated, and coned to produce intramuscular mobilization.  These techniques were used to restore functional range of motion, reduce muscle spasm and induce healing in the

## 2024-03-20 ENCOUNTER — APPOINTMENT (OUTPATIENT)
Dept: OCCUPATIONAL THERAPY | Age: 34
End: 2024-03-20
Payer: COMMERCIAL

## 2024-03-21 ENCOUNTER — OFFICE VISIT (OUTPATIENT)
Dept: FAMILY MEDICINE CLINIC | Age: 34
End: 2024-03-21
Payer: COMMERCIAL

## 2024-03-21 VITALS
HEIGHT: 70 IN | SYSTOLIC BLOOD PRESSURE: 126 MMHG | WEIGHT: 217.2 LBS | DIASTOLIC BLOOD PRESSURE: 70 MMHG | BODY MASS INDEX: 31.09 KG/M2 | HEART RATE: 86 BPM | OXYGEN SATURATION: 97 %

## 2024-03-21 DIAGNOSIS — K59.1 FUNCTIONAL DIARRHEA: Primary | ICD-10-CM

## 2024-03-21 DIAGNOSIS — K21.9 GASTROESOPHAGEAL REFLUX DISEASE, UNSPECIFIED WHETHER ESOPHAGITIS PRESENT: ICD-10-CM

## 2024-03-21 DIAGNOSIS — I10 ESSENTIAL HYPERTENSION: ICD-10-CM

## 2024-03-21 PROCEDURE — 99214 OFFICE O/P EST MOD 30 MIN: CPT | Performed by: SURGERY

## 2024-03-21 PROCEDURE — G8417 CALC BMI ABV UP PARAM F/U: HCPCS | Performed by: SURGERY

## 2024-03-21 PROCEDURE — 4004F PT TOBACCO SCREEN RCVD TLK: CPT | Performed by: SURGERY

## 2024-03-21 PROCEDURE — G8484 FLU IMMUNIZE NO ADMIN: HCPCS | Performed by: SURGERY

## 2024-03-21 PROCEDURE — 3074F SYST BP LT 130 MM HG: CPT | Performed by: SURGERY

## 2024-03-21 PROCEDURE — 3078F DIAST BP <80 MM HG: CPT | Performed by: SURGERY

## 2024-03-21 PROCEDURE — G8427 DOCREV CUR MEDS BY ELIG CLIN: HCPCS | Performed by: SURGERY

## 2024-03-21 RX ORDER — LOSARTAN POTASSIUM AND HYDROCHLOROTHIAZIDE 12.5; 5 MG/1; MG/1
1 TABLET ORAL DAILY
Qty: 30 TABLET | Refills: 0 | Status: SHIPPED | OUTPATIENT
Start: 2024-03-21

## 2024-03-21 RX ORDER — LOPERAMIDE HYDROCHLORIDE 2 MG/1
2 CAPSULE ORAL 4 TIMES DAILY PRN
Qty: 80 CAPSULE | Refills: 0 | Status: SHIPPED | OUTPATIENT
Start: 2024-03-21 | End: 2024-04-10

## 2024-03-21 RX ORDER — OMEPRAZOLE 20 MG/1
20 CAPSULE, DELAYED RELEASE ORAL 2 TIMES DAILY
Qty: 30 CAPSULE | Refills: 0 | Status: SHIPPED | OUTPATIENT
Start: 2024-03-21

## 2024-03-21 ASSESSMENT — ENCOUNTER SYMPTOMS
SHORTNESS OF BREATH: 0
RHINORRHEA: 0
SORE THROAT: 0
ABDOMINAL DISTENTION: 0
PHOTOPHOBIA: 0
COUGH: 0
DIARRHEA: 1
VOMITING: 0
RECTAL PAIN: 0
ABDOMINAL PAIN: 0
BLOOD IN STOOL: 0

## 2024-03-21 NOTE — PROGRESS NOTES
1 tablet by mouth daily  Dispense: 30 tablet; Refill: 0    2. Gastroesophageal reflux disease, unspecified whether esophagitis present  - Good daytime control wearing off in evening will increase to twice daily  - omeprazole (PRILOSEC) 20 MG delayed release capsule; Take 1 capsule by mouth 2 times daily  Dispense: 30 capsule; Refill: 0    3. Functional diarrhea  -May be a side effect of above medications, no signs or symptoms of infectious etiology, no red flags  - loperamide (ANTI-DIARRHEAL) 2 MG capsule; Take 1 capsule by mouth 4 times daily as needed for Diarrhea  Dispense: 80 capsule; Refill: 0      Orders Placed This Encounter   Medications    losartan-hydroCHLOROthiazide (HYZAAR) 50-12.5 MG per tablet     Sig: Take 1 tablet by mouth daily     Dispense:  30 tablet     Refill:  0    omeprazole (PRILOSEC) 20 MG delayed release capsule     Sig: Take 1 capsule by mouth 2 times daily     Dispense:  30 capsule     Refill:  0    loperamide (ANTI-DIARRHEAL) 2 MG capsule     Sig: Take 1 capsule by mouth 4 times daily as needed for Diarrhea     Dispense:  80 capsule     Refill:  0       Return in about 3 months (around 6/21/2024).    Mary Ann Aparicio,       
admitted with STEMI, history DM2, pt on heparin gtt and tridil gtt

## 2024-03-22 ENCOUNTER — TELEPHONE (OUTPATIENT)
Dept: ORTHOPEDIC SURGERY | Age: 34
End: 2024-03-22

## 2024-03-28 ENCOUNTER — TELEPHONE (OUTPATIENT)
Dept: FAMILY MEDICINE CLINIC | Age: 34
End: 2024-03-28

## 2024-04-01 DIAGNOSIS — K21.9 GASTROESOPHAGEAL REFLUX DISEASE, UNSPECIFIED WHETHER ESOPHAGITIS PRESENT: ICD-10-CM

## 2024-04-01 RX ORDER — OMEPRAZOLE 20 MG/1
20 CAPSULE, DELAYED RELEASE ORAL 2 TIMES DAILY
Qty: 30 CAPSULE | Refills: 0 | Status: SHIPPED | OUTPATIENT
Start: 2024-04-01

## 2024-04-01 NOTE — TELEPHONE ENCOUNTER
3/21/2024    Future Appointments   Date Time Provider Department Center   4/9/2024  2:00 PM Summer Carr PA [facility]

## 2024-04-02 RX ORDER — SUMATRIPTAN 50 MG/1
TABLET, FILM COATED ORAL
Qty: 9 TABLET | Refills: 2 | OUTPATIENT
Start: 2024-04-02

## 2024-04-03 RX ORDER — GABAPENTIN 100 MG/1
100 CAPSULE ORAL 2 TIMES DAILY
Qty: 60 CAPSULE | Refills: 2 | Status: SHIPPED | OUTPATIENT
Start: 2024-04-03 | End: 2024-07-02

## 2024-04-03 RX ORDER — GABAPENTIN 100 MG/1
100 CAPSULE ORAL 2 TIMES DAILY
Qty: 60 CAPSULE | Refills: 2 | Status: SHIPPED | OUTPATIENT
Start: 2024-04-03 | End: 2024-04-03

## 2024-04-10 ENCOUNTER — TELEPHONE (OUTPATIENT)
Dept: FAMILY MEDICINE CLINIC | Age: 34
End: 2024-04-10

## 2024-04-10 DIAGNOSIS — K21.9 GASTROESOPHAGEAL REFLUX DISEASE, UNSPECIFIED WHETHER ESOPHAGITIS PRESENT: ICD-10-CM

## 2024-04-10 RX ORDER — OMEPRAZOLE 20 MG/1
20 CAPSULE, DELAYED RELEASE ORAL 2 TIMES DAILY
Qty: 30 CAPSULE | Refills: 0 | Status: SHIPPED | OUTPATIENT
Start: 2024-04-10

## 2024-04-10 NOTE — TELEPHONE ENCOUNTER
L[facility] called.  They knocked patient's Omeprazole 20 mg into the sink. (They had it open while he took his medication and the cat knocked into the sink)  Can we send for an early fill to [facility]

## 2024-04-11 ENCOUNTER — OFFICE VISIT (OUTPATIENT)
Dept: ORTHOPEDIC SURGERY | Age: 34
End: 2024-04-11
Payer: COMMERCIAL

## 2024-04-11 ENCOUNTER — TELEPHONE (OUTPATIENT)
Dept: FAMILY MEDICINE CLINIC | Age: 34
End: 2024-04-11

## 2024-04-11 VITALS — BODY MASS INDEX: 30.1 KG/M2 | HEIGHT: 71 IN | WEIGHT: 215 LBS

## 2024-04-11 DIAGNOSIS — Z87.81 STATUS POST OPEN REDUCTION AND INTERNAL FIXATION (ORIF) OF FRACTURE: Primary | ICD-10-CM

## 2024-04-11 DIAGNOSIS — Z98.890 STATUS POST OPEN REDUCTION AND INTERNAL FIXATION (ORIF) OF FRACTURE: Primary | ICD-10-CM

## 2024-04-11 DIAGNOSIS — S62.102D CLOSED FRACTURE OF LEFT WRIST WITH ROUTINE HEALING, SUBSEQUENT ENCOUNTER: ICD-10-CM

## 2024-04-11 PROCEDURE — 99213 OFFICE O/P EST LOW 20 MIN: CPT

## 2024-04-11 PROCEDURE — G8427 DOCREV CUR MEDS BY ELIG CLIN: HCPCS

## 2024-04-11 PROCEDURE — G8417 CALC BMI ABV UP PARAM F/U: HCPCS

## 2024-04-11 PROCEDURE — 4004F PT TOBACCO SCREEN RCVD TLK: CPT

## 2024-04-11 NOTE — TELEPHONE ENCOUNTER
Patient sent a message from my chart.  If I make him an appointment will you do this?    Message from patient: Letter to probation for my medical marijuana for migraines. I've stated they help me and they requested a letter from you. I have medical marijuana card, if you need to see it.    Please respond via my chart

## 2024-04-12 NOTE — PROGRESS NOTES
History of Present Illness:  Carson Goldstein is a pleasant 33 y.o. male who presents for a post operative visit. He is 6 months out following a left radius/ulna ORIF. Overall He is doing okay. He continues to have occasional muscle spasms in the anterior forearm and pain at the DRUJ with wrist extension and sharp pains at the base of  his left thumb.  He has not yet returned to work.   He denies fevers, chills, numbness, tingling, and shortness of breath.    Medical History:  Patient's medications, allergies, past medical, surgical, social and family histories were reviewed and updated as appropriate.    No notes on file    Review of Systems  A 14 point review of systems was completed by the patient     and is available in the media section of the scanned medical record and was reviewed on 4/12/2024.      Vital Signs:  There were no vitals filed for this visit.      General/Appearance: Alert and oriented and in no apparent distress.    Skin:  There are no skin lesions, cellulitis, or extreme edema. The patient has warm and well-perfused Bilateral upper extremities with brisk capillary refill.      Left forearm/ Wrist Exam:    Inspection: Incisions well healed. There is no erythema, drainage or other signs of infection    Palpation:  No crepitus to gentle motion. Mild tenderness over distal ulnar plate and DRUJ.     Active Range of Motion: Full finger ROM, thumb extension, finger abduction, extension, and thumb apposition and flexion    Passive Range of Motion:  Full passive ROM with some discomfort.     Strength:  4+/5  strength, 4+/5 wrist extension/flexion    Special Tests:  Deferred.    Neurovascular: Sensation to light touch is intact, no motor deficits, palpable radial pulses 2+    Radiology:     Plain radiographs of the left rad/ulna wrist  comprising 2 views:   were obtained and reviewed in the office: Shows postsurgical changes from the   open reduction and internal fixation.  All the fixation remains in

## 2024-04-15 DIAGNOSIS — K21.9 GASTROESOPHAGEAL REFLUX DISEASE, UNSPECIFIED WHETHER ESOPHAGITIS PRESENT: ICD-10-CM

## 2024-04-15 RX ORDER — OMEPRAZOLE 20 MG/1
20 CAPSULE, DELAYED RELEASE ORAL 2 TIMES DAILY
Qty: 30 CAPSULE | Refills: 0 | OUTPATIENT
Start: 2024-04-15

## 2024-04-15 NOTE — TELEPHONE ENCOUNTER
Pharmacy is requesting a 90 day supply     Last ov 03/21/2024   Future Appointments   Date Time Provider Department Center   4/17/2024  7:20 AM Sulema Tineo OT [facility] [place] [facility]   4/18/2024  8:20 AM Mary Ann Aparicio DO [facility] [place] [facility]   4/22/2024  7:20 AM Sulema Tineo OT [facility] [place] [facility]   4/24/2024  7:20 AM Sulema Tineo OT [facility] [place] [facility]   4/29/2024  7:20 AM Sulema Tineo OT [facility] [place] [facility]   5/1/2024  7:20 AM Sulema Tineo OT [facility] [place] [facility]

## 2024-04-18 ENCOUNTER — OFFICE VISIT (OUTPATIENT)
Dept: FAMILY MEDICINE CLINIC | Age: 34
End: 2024-04-18
Payer: COMMERCIAL

## 2024-04-18 VITALS
BODY MASS INDEX: 30.8 KG/M2 | SYSTOLIC BLOOD PRESSURE: 165 MMHG | HEART RATE: 79 BPM | WEIGHT: 220 LBS | OXYGEN SATURATION: 98 % | DIASTOLIC BLOOD PRESSURE: 92 MMHG | HEIGHT: 71 IN

## 2024-04-18 DIAGNOSIS — G43.019 INTRACTABLE MIGRAINE WITHOUT AURA AND WITHOUT STATUS MIGRAINOSUS: Primary | ICD-10-CM

## 2024-04-18 DIAGNOSIS — I10 ESSENTIAL HYPERTENSION: ICD-10-CM

## 2024-04-18 PROCEDURE — 3080F DIAST BP >= 90 MM HG: CPT | Performed by: SURGERY

## 2024-04-18 PROCEDURE — 99214 OFFICE O/P EST MOD 30 MIN: CPT | Performed by: SURGERY

## 2024-04-18 PROCEDURE — G8417 CALC BMI ABV UP PARAM F/U: HCPCS | Performed by: SURGERY

## 2024-04-18 PROCEDURE — 4004F PT TOBACCO SCREEN RCVD TLK: CPT | Performed by: SURGERY

## 2024-04-18 PROCEDURE — 3077F SYST BP >= 140 MM HG: CPT | Performed by: SURGERY

## 2024-04-18 PROCEDURE — G8427 DOCREV CUR MEDS BY ELIG CLIN: HCPCS | Performed by: SURGERY

## 2024-04-18 RX ORDER — LOSARTAN POTASSIUM AND HYDROCHLOROTHIAZIDE 12.5; 1 MG/1; MG/1
1 TABLET ORAL DAILY
Qty: 90 TABLET | Refills: 1 | Status: SHIPPED | OUTPATIENT
Start: 2024-04-18

## 2024-04-18 NOTE — PROGRESS NOTES
4/18/2024    This is a 33 y.o. male   Chief Complaint   Patient presents with    Letter     For Medical marijuana.    .    Here today for a letter to his  regarding medical marijuana usage.  See letter for full details.     Additionally he notes he has been monitoring his blood pressure at home and it has not been less than 155 systolic.  He denies any symptoms of hypertension.         Patient Active Problem List   Diagnosis    Essential hypertension    Intractable migraine without aura and without status migrainosus    Smoker    Wrist fracture, closed, left, initial encounter    Open wrist fracture, left, initial encounter    Gastroesophageal reflux disease       Current Outpatient Medications   Medication Sig Dispense Refill    losartan-hydroCHLOROthiazide (HYZAAR) 100-12.5 MG per tablet Take 1 tablet by mouth daily 90 tablet 1    omeprazole (PRILOSEC) 20 MG delayed release capsule Take 1 capsule by mouth 2 times daily 30 capsule 0    gabapentin (NEURONTIN) 100 MG capsule Take 1 capsule by mouth 2 times daily for 90 days. 60 capsule 2    losartan-hydroCHLOROthiazide (HYZAAR) 50-12.5 MG per tablet Take 1 tablet by mouth daily 30 tablet 0    cyclobenzaprine (FLEXERIL) 10 MG tablet TAKE ONE TABLET BY MOUTH ONCE NIGHTLY AS NEEDED FOR MUSCLE SPASMS 21 tablet 0    Ascorbic Acid (VITAMIN C) 1000 MG tablet TAKE 1 TABLET BY MOUTH DAILY 60 tablet 2    IBUPROFEN IB PO Take by mouth      SUMAtriptan (IMITREX) 50 MG tablet Take 1 tablet by mouth as needed for Migraine 9 tablet 2     No current facility-administered medications for this visit.       No Known Allergies    Social History     Tobacco Use    Smoking status: Former     Current packs/day: 1.00     Average packs/day: 0.5 packs/day for 16.3 years (8.3 ttl pk-yrs)     Types: Cigarettes     Start date: 2008    Smokeless tobacco: Current   Substance Use Topics    Alcohol use: Yes     Alcohol/week: 3.0 standard drinks of alcohol     Types: 3 Shots of liquor

## 2024-04-25 ENCOUNTER — OFFICE VISIT (OUTPATIENT)
Dept: ORTHOPEDIC SURGERY | Age: 34
End: 2024-04-25
Payer: COMMERCIAL

## 2024-04-25 VITALS — HEIGHT: 71 IN | WEIGHT: 215 LBS | BODY MASS INDEX: 30.1 KG/M2

## 2024-04-25 DIAGNOSIS — S62.102D CLOSED FRACTURE OF LEFT WRIST WITH ROUTINE HEALING, SUBSEQUENT ENCOUNTER: ICD-10-CM

## 2024-04-25 DIAGNOSIS — Z98.890 STATUS POST OPEN REDUCTION AND INTERNAL FIXATION (ORIF) OF FRACTURE: Primary | ICD-10-CM

## 2024-04-25 DIAGNOSIS — M24.132 DEGENERATIVE TEAR OF TRIANGULAR FIBROCARTILAGE COMPLEX (TFCC) OF LEFT WRIST: ICD-10-CM

## 2024-04-25 DIAGNOSIS — Z87.81 STATUS POST OPEN REDUCTION AND INTERNAL FIXATION (ORIF) OF FRACTURE: Primary | ICD-10-CM

## 2024-04-25 PROCEDURE — G8427 DOCREV CUR MEDS BY ELIG CLIN: HCPCS | Performed by: ORTHOPAEDIC SURGERY

## 2024-04-25 PROCEDURE — G8417 CALC BMI ABV UP PARAM F/U: HCPCS | Performed by: ORTHOPAEDIC SURGERY

## 2024-04-25 PROCEDURE — 4004F PT TOBACCO SCREEN RCVD TLK: CPT | Performed by: ORTHOPAEDIC SURGERY

## 2024-04-25 PROCEDURE — 99214 OFFICE O/P EST MOD 30 MIN: CPT | Performed by: ORTHOPAEDIC SURGERY

## 2024-04-25 NOTE — PROGRESS NOTES
[facility]  Office Visit    Chief Complaint    Wrist Pain (6 mos post op left ORIF)      History of Present Illness:  Carson Goldstein is a 33 y.o. male who presents for follow up 6 months s/p left radius/ulna ORIF for an open fracture.  Overall he is doing okay. He continues to have occasional muscle spasms in the anterior forearm and pain at the DRUJ with wrist extension and sharp pains at the base of  his left thumb.  He has not yet returned to work.     The patient denies any injuries or setbacks.     Pain Assessment  Location of Pain: Wrist  Location Modifiers: Left  Severity of Pain: 4  Frequency of Pain: Intermittent  Aggravating Factors: Bending  Limiting Behavior: Yes  Relieving Factors: Rest, Ice, Nsaids  Result of Injury: Yes  Work-Related Injury: No    Past Medical History:   Diagnosis Date    Hypertension     Migraines         Past Surgical History:   Procedure Laterality Date    FOREARM SURGERY Left 10/22/2023    RADIUS AND ULNA OPEN REDUCTION INTERNAL FIXATION, IRRIGATION AND DEBRIDEMENT OPEN FRACTURE LEFT ARM performed by Johan Payan MD at [facility] OR    NOSE SURGERY         Family History   Problem Relation Age of Onset    Depression Mother        Social History     Socioeconomic History    Marital status: Single     Spouse name: None    Number of children: None    Years of education: None    Highest education level: None   Tobacco Use    Smoking status: Former     Current packs/day: 1.00     Average packs/day: 0.5 packs/day for 16.3 years (8.3 ttl pk-yrs)     Types: Cigarettes     Start date: 2008    Smokeless tobacco: Current   Vaping Use    Vaping Use: Every day    Substances: Nicotine    Devices: Disposable   Substance and Sexual Activity    Alcohol use: Yes     Alcohol/week: 3.0 standard drinks of alcohol     Types: 3 Shots of liquor per week    Drug use: Not Currently     Types: Marijuana (Weed)     Social Determinants of Health     Financial

## 2024-05-02 DIAGNOSIS — Z87.81 STATUS POST OPEN REDUCTION AND INTERNAL FIXATION (ORIF) OF FRACTURE: ICD-10-CM

## 2024-05-02 DIAGNOSIS — Z98.890 STATUS POST OPEN REDUCTION AND INTERNAL FIXATION (ORIF) OF FRACTURE: ICD-10-CM

## 2024-05-02 LAB
CRP SERPL-MCNC: <3 MG/L (ref 0–5.1)
ERYTHROCYTE [SEDIMENTATION RATE] IN BLOOD BY WESTERGREN METHOD: 5 MM/HR (ref 0–15)

## 2024-05-08 DIAGNOSIS — M24.132 DEGENERATIVE TEAR OF TRIANGULAR FIBROCARTILAGE COMPLEX (TFCC) OF LEFT WRIST: ICD-10-CM

## 2024-05-18 DIAGNOSIS — K21.9 GASTROESOPHAGEAL REFLUX DISEASE, UNSPECIFIED WHETHER ESOPHAGITIS PRESENT: ICD-10-CM

## 2024-05-20 RX ORDER — SUMATRIPTAN 50 MG/1
TABLET, FILM COATED ORAL
Qty: 9 TABLET | Refills: 2 | OUTPATIENT
Start: 2024-05-20

## 2024-05-20 RX ORDER — OMEPRAZOLE 20 MG/1
20 CAPSULE, DELAYED RELEASE ORAL 2 TIMES DAILY
Qty: 30 CAPSULE | Refills: 0 | Status: SHIPPED | OUTPATIENT
Start: 2024-05-20

## 2024-05-20 NOTE — TELEPHONE ENCOUNTER
Medication:   Requested Prescriptions     Pending Prescriptions Disp Refills    SUMAtriptan (IMITREX) 50 MG tablet [Pharmacy Med Name: SUMAtriptan SUCC 50 MG TABLET] 9 tablet 2     Sig: TAKE 1 TABLET BY MOUTH AT ONSET OF HEADACHE; MAY REPEAT 1 TABLET IN 2 HOURS IF NEEDED.        Last Filled:      Patient Phone Number: [phone] (home)     Last appt: 10/27/2023   Next appt: Visit date not found    Last OARRS:        No data to display

## 2024-05-21 ENCOUNTER — TELEPHONE (OUTPATIENT)
Dept: ORTHOPEDIC SURGERY | Age: 34
End: 2024-05-21

## 2024-05-21 NOTE — TELEPHONE ENCOUNTER
S/w patient and scheduled follow up to discuss test results with Dr Payan on 6/6/24 at 10\"15 am in [place]

## 2024-05-21 NOTE — TELEPHONE ENCOUNTER
Test Results     Type of Test: MRI AND BLOOD WORK  Date of Test: 05/08  Location of Test:   Patient Contact Number: [phone]      Pt HAS DONE BLOOD WORK AND MRI, AND HE WAS TO CALL BACK WHEN HE HAD THEM DONE, AND DR NATHAN SAID HE WOULD GO FROM THERE. PLEASE CALL Pt TO LET HIM WHAT IS NEXT?

## 2024-06-06 ENCOUNTER — OFFICE VISIT (OUTPATIENT)
Dept: ORTHOPEDIC SURGERY | Age: 34
End: 2024-06-06

## 2024-06-06 VITALS — BODY MASS INDEX: 30.8 KG/M2 | HEIGHT: 71 IN | WEIGHT: 220 LBS

## 2024-06-06 DIAGNOSIS — Z98.890 STATUS POST OPEN REDUCTION AND INTERNAL FIXATION (ORIF) OF FRACTURE: Primary | ICD-10-CM

## 2024-06-06 DIAGNOSIS — S62.102G: ICD-10-CM

## 2024-06-06 DIAGNOSIS — Z87.81 STATUS POST OPEN REDUCTION AND INTERNAL FIXATION (ORIF) OF FRACTURE: Primary | ICD-10-CM

## 2024-06-06 NOTE — PROGRESS NOTES
[facility]  Office Visit    Chief Complaint    Wrist Pain (TR MRI left wrist; 7.5 mos post op)      History of Present Illness:  Carson Goldstein is a 34 y.o. male who presents for MRI review left wrist, 6 months post ORIF both bone forearm fracture. He has dorsal sided wrist pain and some extensor tendon irritation and snapping more proximally and distal forearm.     The patient denies any injuries or setbacks.     Pain Assessment  Location of Pain: Wrist  Location Modifiers: Left  Severity of Pain: 6  Quality of Pain: Sharp, Aching, Throbbing  Frequency of Pain: Intermittent  Aggravating Factors: Other (Comment) (movement)  Limiting Behavior: Yes  Result of Injury: Yes    Past Medical History:   Diagnosis Date    Hypertension     Migraines         Past Surgical History:   Procedure Laterality Date    FOREARM SURGERY Left 10/22/2023    RADIUS AND ULNA OPEN REDUCTION INTERNAL FIXATION, IRRIGATION AND DEBRIDEMENT OPEN FRACTURE LEFT ARM performed by Johan Payan MD at [facility] OR    NOSE SURGERY         Family History   Problem Relation Age of Onset    Depression Mother        Social History     Socioeconomic History    Marital status: Single     Spouse name: None    Number of children: None    Years of education: None    Highest education level: None   Tobacco Use    Smoking status: Former     Current packs/day: 1.00     Average packs/day: 0.5 packs/day for 16.4 years (8.4 ttl pk-yrs)     Types: Cigarettes     Start date: 2008    Smokeless tobacco: Current   Vaping Use    Vaping Use: Every day    Substances: Nicotine    Devices: Disposable   Substance and Sexual Activity    Alcohol use: Yes     Alcohol/week: 3.0 standard drinks of alcohol     Types: 3 Shots of liquor per week    Drug use: Not Currently     Types: Marijuana (Weed)     Social Determinants of Health     Financial Resource Strain: Low Risk  (8/16/2023)    Overall Financial Resource Strain (CARDIA)     Difficulty

## 2024-06-23 DIAGNOSIS — K21.9 GASTROESOPHAGEAL REFLUX DISEASE, UNSPECIFIED WHETHER ESOPHAGITIS PRESENT: ICD-10-CM

## 2024-06-24 RX ORDER — OMEPRAZOLE 20 MG/1
20 CAPSULE, DELAYED RELEASE ORAL 2 TIMES DAILY
Qty: 30 CAPSULE | Refills: 0 | Status: SHIPPED | OUTPATIENT
Start: 2024-06-24

## 2024-06-24 NOTE — TELEPHONE ENCOUNTER
Last ov 04/18/2024   Future Appointments   Date Time Provider Department Center   6/27/2024 11:00 AM Mary Ann Aparicio DO [facility] [place] [facility]

## 2024-06-27 ENCOUNTER — OFFICE VISIT (OUTPATIENT)
Dept: FAMILY MEDICINE CLINIC | Age: 34
End: 2024-06-27
Payer: COMMERCIAL

## 2024-06-27 VITALS
HEART RATE: 78 BPM | SYSTOLIC BLOOD PRESSURE: 160 MMHG | WEIGHT: 218.8 LBS | DIASTOLIC BLOOD PRESSURE: 104 MMHG | OXYGEN SATURATION: 98 % | HEIGHT: 71 IN | BODY MASS INDEX: 30.63 KG/M2

## 2024-06-27 DIAGNOSIS — Z01.818 PREOP EXAMINATION: Primary | ICD-10-CM

## 2024-06-27 PROCEDURE — G8427 DOCREV CUR MEDS BY ELIG CLIN: HCPCS | Performed by: SURGERY

## 2024-06-27 PROCEDURE — 3079F DIAST BP 80-89 MM HG: CPT | Performed by: SURGERY

## 2024-06-27 PROCEDURE — G8417 CALC BMI ABV UP PARAM F/U: HCPCS | Performed by: SURGERY

## 2024-06-27 PROCEDURE — 4004F PT TOBACCO SCREEN RCVD TLK: CPT | Performed by: SURGERY

## 2024-06-27 PROCEDURE — 3077F SYST BP >= 140 MM HG: CPT | Performed by: SURGERY

## 2024-06-27 PROCEDURE — 99213 OFFICE O/P EST LOW 20 MIN: CPT | Performed by: SURGERY

## 2024-06-27 ASSESSMENT — ENCOUNTER SYMPTOMS
SORE THROAT: 0
NAUSEA: 0
DIARRHEA: 0
COUGH: 0
ABDOMINAL PAIN: 0
SHORTNESS OF BREATH: 0
CONSTIPATION: 0

## 2024-06-27 NOTE — PROGRESS NOTES
DIAGNOSIS:  TFCC injury s/p ORIF    PROCEDURE:  removal of symptomatic hardware left wrist/forearm  Surgeon :  Dr Barnett  Date of surgery:  7/5/24    History Obtained From:  patient    HISTORY OF PRESENT ILLNESS:    The patient is a 34 y.o. male here for preoperative evaluation for forearm surgery.  Current problems being experienced are pain.   Current chronic conditions:  HTN, migraines     Past Medical History:   Diagnosis Date    Hypertension     Migraines      Past Surgical History:   Procedure Laterality Date    FOREARM SURGERY Left 10/22/2023    RADIUS AND ULNA OPEN REDUCTION INTERNAL FIXATION, IRRIGATION AND DEBRIDEMENT OPEN FRACTURE LEFT ARM performed by Johan Payan MD at [facility] OR    NOSE SURGERY       Social History     Tobacco Use    Smoking status: Former     Current packs/day: 1.00     Average packs/day: 0.5 packs/day for 16.5 years (8.5 ttl pk-yrs)     Types: Cigarettes     Start date: 2008    Smokeless tobacco: Current   Substance Use Topics    Alcohol use: Yes     Alcohol/week: 3.0 standard drinks of alcohol     Types: 3 Shots of liquor per week     Family History   Problem Relation Age of Onset    Depression Mother      Current Outpatient Medications   Medication Sig Dispense Refill    medical marijuana Inhale 1 each into the lungs as needed (migraines).      omeprazole (PRILOSEC) 20 MG delayed release capsule TAKE 1 CAPSULE BY MOUTH 2 TIMES A DAY 30 capsule 0    losartan-hydroCHLOROthiazide (HYZAAR) 100-12.5 MG per tablet Take 1 tablet by mouth daily 90 tablet 1    gabapentin (NEURONTIN) 100 MG capsule Take 1 capsule by mouth 2 times daily for 90 days. 60 capsule 2    cyclobenzaprine (FLEXERIL) 10 MG tablet TAKE ONE TABLET BY MOUTH ONCE NIGHTLY AS NEEDED FOR MUSCLE SPASMS 21 tablet 0    Ascorbic Acid (VITAMIN C) 1000 MG tablet TAKE 1 TABLET BY MOUTH DAILY 60 tablet 2    IBUPROFEN IB PO Take by mouth      SUMAtriptan (IMITREX) 50 MG tablet Take 1 tablet by mouth as needed for Migraine 9

## 2024-06-30 RX ORDER — GABAPENTIN 100 MG/1
100 CAPSULE ORAL 2 TIMES DAILY
Qty: 60 CAPSULE | Refills: 2 | OUTPATIENT
Start: 2024-06-30

## 2024-07-05 ENCOUNTER — ANESTHESIA EVENT (OUTPATIENT)
Dept: OPERATING ROOM | Age: 34
End: 2024-07-05
Payer: COMMERCIAL

## 2024-07-05 ENCOUNTER — ANESTHESIA (OUTPATIENT)
Dept: OPERATING ROOM | Age: 34
End: 2024-07-05
Payer: COMMERCIAL

## 2024-07-05 ENCOUNTER — HOSPITAL ENCOUNTER (OUTPATIENT)
Age: 34
Setting detail: OUTPATIENT SURGERY
Discharge: HOME | End: 2024-07-05
Attending: ORTHOPAEDIC SURGERY | Admitting: ORTHOPAEDIC SURGERY
Payer: COMMERCIAL

## 2024-07-05 ENCOUNTER — APPOINTMENT (OUTPATIENT)
Dept: GENERAL RADIOLOGY | Age: 34
End: 2024-07-05
Attending: ORTHOPAEDIC SURGERY
Payer: COMMERCIAL

## 2024-07-05 VITALS
TEMPERATURE: 97.1 F | DIASTOLIC BLOOD PRESSURE: 84 MMHG | SYSTOLIC BLOOD PRESSURE: 126 MMHG | HEART RATE: 57 BPM | WEIGHT: 220.6 LBS | RESPIRATION RATE: 13 BRPM | BODY MASS INDEX: 30.88 KG/M2 | OXYGEN SATURATION: 92 % | HEIGHT: 71 IN

## 2024-07-05 DIAGNOSIS — S62.102A WRIST FRACTURE, CLOSED, LEFT, INITIAL ENCOUNTER: Primary | ICD-10-CM

## 2024-07-05 PROCEDURE — 2500000003 HC RX 250 WO HCPCS: Performed by: ORTHOPAEDIC SURGERY

## 2024-07-05 PROCEDURE — 7100000011 HC PHASE II RECOVERY - ADDTL 15 MIN: Performed by: ORTHOPAEDIC SURGERY

## 2024-07-05 PROCEDURE — 64415 NJX AA&/STRD BRCH PLXS IMG: CPT | Performed by: ANESTHESIOLOGY

## 2024-07-05 PROCEDURE — 6360000002 HC RX W HCPCS: Performed by: NURSE ANESTHETIST, CERTIFIED REGISTERED

## 2024-07-05 PROCEDURE — 2709999900 HC NON-CHARGEABLE SUPPLY: Performed by: ORTHOPAEDIC SURGERY

## 2024-07-05 PROCEDURE — 6360000002 HC RX W HCPCS: Performed by: ANESTHESIOLOGY

## 2024-07-05 PROCEDURE — 2580000003 HC RX 258: Performed by: ORTHOPAEDIC SURGERY

## 2024-07-05 PROCEDURE — 3700000001 HC ADD 15 MINUTES (ANESTHESIA): Performed by: ORTHOPAEDIC SURGERY

## 2024-07-05 PROCEDURE — 6360000002 HC RX W HCPCS: Performed by: ORTHOPAEDIC SURGERY

## 2024-07-05 PROCEDURE — 3700000000 HC ANESTHESIA ATTENDED CARE: Performed by: ORTHOPAEDIC SURGERY

## 2024-07-05 PROCEDURE — 73090 X-RAY EXAM OF FOREARM: CPT

## 2024-07-05 PROCEDURE — 3600000014 HC SURGERY LEVEL 4 ADDTL 15MIN: Performed by: ORTHOPAEDIC SURGERY

## 2024-07-05 PROCEDURE — 3600000004 HC SURGERY LEVEL 4 BASE: Performed by: ORTHOPAEDIC SURGERY

## 2024-07-05 PROCEDURE — 2500000003 HC RX 250 WO HCPCS: Performed by: NURSE ANESTHETIST, CERTIFIED REGISTERED

## 2024-07-05 PROCEDURE — 7100000010 HC PHASE II RECOVERY - FIRST 15 MIN: Performed by: ORTHOPAEDIC SURGERY

## 2024-07-05 PROCEDURE — 7100000000 HC PACU RECOVERY - FIRST 15 MIN: Performed by: ORTHOPAEDIC SURGERY

## 2024-07-05 PROCEDURE — 2580000003 HC RX 258: Performed by: ANESTHESIOLOGY

## 2024-07-05 PROCEDURE — C9290 INJ, BUPIVACAINE LIPOSOME: HCPCS | Performed by: ANESTHESIOLOGY

## 2024-07-05 PROCEDURE — 7100000001 HC PACU RECOVERY - ADDTL 15 MIN: Performed by: ORTHOPAEDIC SURGERY

## 2024-07-05 PROCEDURE — 6370000000 HC RX 637 (ALT 250 FOR IP): Performed by: ANESTHESIOLOGY

## 2024-07-05 RX ORDER — SODIUM CHLORIDE 0.9 % (FLUSH) 0.9 %
5-40 SYRINGE (ML) INJECTION EVERY 12 HOURS SCHEDULED
Status: DISCONTINUED | OUTPATIENT
Start: 2024-07-05 | End: 2024-07-05 | Stop reason: HOSPADM

## 2024-07-05 RX ORDER — LIDOCAINE HCL/PF 100 MG/5ML
SYRINGE (ML) INJECTION PRN
Status: DISCONTINUED | OUTPATIENT
Start: 2024-07-05 | End: 2024-07-05 | Stop reason: SDUPTHER

## 2024-07-05 RX ORDER — ONDANSETRON 2 MG/ML
INJECTION INTRAMUSCULAR; INTRAVENOUS PRN
Status: DISCONTINUED | OUTPATIENT
Start: 2024-07-05 | End: 2024-07-05 | Stop reason: SDUPTHER

## 2024-07-05 RX ORDER — FAMOTIDINE 10 MG/ML
INJECTION, SOLUTION INTRAVENOUS PRN
Status: DISCONTINUED | OUTPATIENT
Start: 2024-07-05 | End: 2024-07-05 | Stop reason: SDUPTHER

## 2024-07-05 RX ORDER — MEPERIDINE HYDROCHLORIDE 25 MG/ML
25 INJECTION INTRAMUSCULAR; INTRAVENOUS; SUBCUTANEOUS
Status: DISCONTINUED | OUTPATIENT
Start: 2024-07-05 | End: 2024-07-05 | Stop reason: HOSPADM

## 2024-07-05 RX ORDER — HYDROMORPHONE HYDROCHLORIDE 1 MG/ML
0.5 INJECTION, SOLUTION INTRAMUSCULAR; INTRAVENOUS; SUBCUTANEOUS EVERY 5 MIN PRN
Status: DISCONTINUED | OUTPATIENT
Start: 2024-07-05 | End: 2024-07-05 | Stop reason: HOSPADM

## 2024-07-05 RX ORDER — ONDANSETRON 2 MG/ML
4 INJECTION INTRAMUSCULAR; INTRAVENOUS
Status: DISCONTINUED | OUTPATIENT
Start: 2024-07-05 | End: 2024-07-05 | Stop reason: HOSPADM

## 2024-07-05 RX ORDER — PROPOFOL 10 MG/ML
INJECTION, EMULSION INTRAVENOUS PRN
Status: DISCONTINUED | OUTPATIENT
Start: 2024-07-05 | End: 2024-07-05 | Stop reason: SDUPTHER

## 2024-07-05 RX ORDER — LABETALOL HYDROCHLORIDE 5 MG/ML
10 INJECTION, SOLUTION INTRAVENOUS
Status: DISCONTINUED | OUTPATIENT
Start: 2024-07-05 | End: 2024-07-05 | Stop reason: HOSPADM

## 2024-07-05 RX ORDER — BUPIVACAINE HYDROCHLORIDE 5 MG/ML
INJECTION, SOLUTION EPIDURAL; INTRACAUDAL
Status: DISCONTINUED | OUTPATIENT
Start: 2024-07-05 | End: 2024-07-05 | Stop reason: SDUPTHER

## 2024-07-05 RX ORDER — MIDAZOLAM HYDROCHLORIDE 1 MG/ML
INJECTION INTRAMUSCULAR; INTRAVENOUS PRN
Status: DISCONTINUED | OUTPATIENT
Start: 2024-07-05 | End: 2024-07-05 | Stop reason: SDUPTHER

## 2024-07-05 RX ORDER — IPRATROPIUM BROMIDE AND ALBUTEROL SULFATE 2.5; .5 MG/3ML; MG/3ML
1 SOLUTION RESPIRATORY (INHALATION)
Status: DISCONTINUED | OUTPATIENT
Start: 2024-07-05 | End: 2024-07-05 | Stop reason: HOSPADM

## 2024-07-05 RX ORDER — NALOXONE HYDROCHLORIDE 0.4 MG/ML
INJECTION, SOLUTION INTRAMUSCULAR; INTRAVENOUS; SUBCUTANEOUS PRN
Status: DISCONTINUED | OUTPATIENT
Start: 2024-07-05 | End: 2024-07-05 | Stop reason: HOSPADM

## 2024-07-05 RX ORDER — SODIUM CHLORIDE, SODIUM LACTATE, POTASSIUM CHLORIDE, CALCIUM CHLORIDE 600; 310; 30; 20 MG/100ML; MG/100ML; MG/100ML; MG/100ML
INJECTION, SOLUTION INTRAVENOUS CONTINUOUS
Status: DISCONTINUED | OUTPATIENT
Start: 2024-07-05 | End: 2024-07-05 | Stop reason: HOSPADM

## 2024-07-05 RX ORDER — SODIUM CHLORIDE 9 MG/ML
INJECTION, SOLUTION INTRAVENOUS PRN
Status: DISCONTINUED | OUTPATIENT
Start: 2024-07-05 | End: 2024-07-05 | Stop reason: HOSPADM

## 2024-07-05 RX ORDER — PROCHLORPERAZINE EDISYLATE 5 MG/ML
5 INJECTION INTRAMUSCULAR; INTRAVENOUS
Status: DISCONTINUED | OUTPATIENT
Start: 2024-07-05 | End: 2024-07-05 | Stop reason: HOSPADM

## 2024-07-05 RX ORDER — FENTANYL CITRATE 50 UG/ML
INJECTION, SOLUTION INTRAMUSCULAR; INTRAVENOUS PRN
Status: DISCONTINUED | OUTPATIENT
Start: 2024-07-05 | End: 2024-07-05 | Stop reason: SDUPTHER

## 2024-07-05 RX ORDER — SODIUM CHLORIDE 0.9 % (FLUSH) 0.9 %
5-40 SYRINGE (ML) INJECTION PRN
Status: DISCONTINUED | OUTPATIENT
Start: 2024-07-05 | End: 2024-07-05 | Stop reason: HOSPADM

## 2024-07-05 RX ORDER — DEXAMETHASONE SODIUM PHOSPHATE 4 MG/ML
INJECTION, SOLUTION INTRA-ARTICULAR; INTRALESIONAL; INTRAMUSCULAR; INTRAVENOUS; SOFT TISSUE PRN
Status: DISCONTINUED | OUTPATIENT
Start: 2024-07-05 | End: 2024-07-05 | Stop reason: SDUPTHER

## 2024-07-05 RX ORDER — ACETAMINOPHEN 325 MG/1
650 TABLET ORAL
Status: DISCONTINUED | OUTPATIENT
Start: 2024-07-05 | End: 2024-07-05 | Stop reason: HOSPADM

## 2024-07-05 RX ORDER — FENTANYL CITRATE 50 UG/ML
25 INJECTION, SOLUTION INTRAMUSCULAR; INTRAVENOUS EVERY 5 MIN PRN
Status: DISCONTINUED | OUTPATIENT
Start: 2024-07-05 | End: 2024-07-05 | Stop reason: HOSPADM

## 2024-07-05 RX ORDER — GLYCOPYRROLATE 0.2 MG/ML
INJECTION INTRAMUSCULAR; INTRAVENOUS PRN
Status: DISCONTINUED | OUTPATIENT
Start: 2024-07-05 | End: 2024-07-05 | Stop reason: SDUPTHER

## 2024-07-05 RX ORDER — HYDROMORPHONE HYDROCHLORIDE 2 MG/ML
INJECTION, SOLUTION INTRAMUSCULAR; INTRAVENOUS; SUBCUTANEOUS PRN
Status: DISCONTINUED | OUTPATIENT
Start: 2024-07-05 | End: 2024-07-05 | Stop reason: SDUPTHER

## 2024-07-05 RX ORDER — HYDROCODONE BITARTRATE AND ACETAMINOPHEN 5; 325 MG/1; MG/1
1 TABLET ORAL EVERY 6 HOURS PRN
Qty: 14 TABLET | Refills: 0 | Status: SHIPPED | OUTPATIENT
Start: 2024-07-05 | End: 2024-07-10

## 2024-07-05 RX ORDER — OXYCODONE HYDROCHLORIDE AND ACETAMINOPHEN 5; 325 MG/1; MG/1
1 TABLET ORAL ONCE
Status: COMPLETED | OUTPATIENT
Start: 2024-07-05 | End: 2024-07-05

## 2024-07-05 RX ADMIN — FENTANYL CITRATE 50 MCG: 50 INJECTION, SOLUTION INTRAMUSCULAR; INTRAVENOUS at 07:36

## 2024-07-05 RX ADMIN — HYDROMORPHONE HYDROCHLORIDE 1 MG: 2 INJECTION, SOLUTION INTRAMUSCULAR; INTRAVENOUS; SUBCUTANEOUS at 08:40

## 2024-07-05 RX ADMIN — Medication 100 MG: at 07:42

## 2024-07-05 RX ADMIN — OXYCODONE HYDROCHLORIDE AND ACETAMINOPHEN 1 TABLET: 5; 325 TABLET ORAL at 11:00

## 2024-07-05 RX ADMIN — HYDROMORPHONE HYDROCHLORIDE 0.5 MG: 1 INJECTION, SOLUTION INTRAMUSCULAR; INTRAVENOUS; SUBCUTANEOUS at 09:56

## 2024-07-05 RX ADMIN — BUPIVACAINE HYDROCHLORIDE 10 ML: 5 INJECTION, SOLUTION EPIDURAL; INTRACAUDAL; PERINEURAL at 09:28

## 2024-07-05 RX ADMIN — HYDROMORPHONE HYDROCHLORIDE 0.5 MG: 1 INJECTION, SOLUTION INTRAMUSCULAR; INTRAVENOUS; SUBCUTANEOUS at 09:50

## 2024-07-05 RX ADMIN — DEXAMETHASONE SODIUM PHOSPHATE 4 MG: 4 INJECTION, SOLUTION INTRAMUSCULAR; INTRAVENOUS at 08:20

## 2024-07-05 RX ADMIN — MIDAZOLAM HYDROCHLORIDE 2 MG: 2 INJECTION, SOLUTION INTRAMUSCULAR; INTRAVENOUS at 07:36

## 2024-07-05 RX ADMIN — WATER 2000 MG: 1 INJECTION INTRAMUSCULAR; INTRAVENOUS; SUBCUTANEOUS at 07:44

## 2024-07-05 RX ADMIN — GLYCOPYRROLATE 0.2 MG: 0.2 INJECTION INTRAMUSCULAR; INTRAVENOUS at 07:51

## 2024-07-05 RX ADMIN — SODIUM CHLORIDE, POTASSIUM CHLORIDE, SODIUM LACTATE AND CALCIUM CHLORIDE: 600; 310; 30; 20 INJECTION, SOLUTION INTRAVENOUS at 06:05

## 2024-07-05 RX ADMIN — PHENYLEPHRINE HYDROCHLORIDE 50 MCG: 10 INJECTION INTRAVENOUS at 08:52

## 2024-07-05 RX ADMIN — HYDROMORPHONE HYDROCHLORIDE 0.5 MG: 2 INJECTION, SOLUTION INTRAMUSCULAR; INTRAVENOUS; SUBCUTANEOUS at 08:09

## 2024-07-05 RX ADMIN — BUPIVACAINE 10 ML: 13.3 INJECTION, SUSPENSION, LIPOSOMAL INFILTRATION at 09:28

## 2024-07-05 RX ADMIN — PHENYLEPHRINE HYDROCHLORIDE 100 MCG: 10 INJECTION INTRAVENOUS at 07:57

## 2024-07-05 RX ADMIN — ONDANSETRON 4 MG: 2 INJECTION INTRAMUSCULAR; INTRAVENOUS at 07:36

## 2024-07-05 RX ADMIN — GLYCOPYRROLATE 0.2 MG: 0.2 INJECTION INTRAMUSCULAR; INTRAVENOUS at 07:57

## 2024-07-05 RX ADMIN — FAMOTIDINE 20 MG: 10 INJECTION, SOLUTION INTRAVENOUS at 08:06

## 2024-07-05 RX ADMIN — HYDROMORPHONE HYDROCHLORIDE 0.5 MG: 1 INJECTION, SOLUTION INTRAMUSCULAR; INTRAVENOUS; SUBCUTANEOUS at 10:05

## 2024-07-05 RX ADMIN — PROPOFOL 200 MG: 10 INJECTION, EMULSION INTRAVENOUS at 07:42

## 2024-07-05 RX ADMIN — SODIUM CHLORIDE, POTASSIUM CHLORIDE, SODIUM LACTATE AND CALCIUM CHLORIDE: 600; 310; 30; 20 INJECTION, SOLUTION INTRAVENOUS at 08:07

## 2024-07-05 RX ADMIN — HYDROMORPHONE HYDROCHLORIDE 0.5 MG: 2 INJECTION, SOLUTION INTRAMUSCULAR; INTRAVENOUS; SUBCUTANEOUS at 08:06

## 2024-07-05 RX ADMIN — FENTANYL CITRATE 50 MCG: 50 INJECTION, SOLUTION INTRAMUSCULAR; INTRAVENOUS at 07:48

## 2024-07-05 ASSESSMENT — PAIN - FUNCTIONAL ASSESSMENT
PAIN_FUNCTIONAL_ASSESSMENT: 0-10
PAIN_FUNCTIONAL_ASSESSMENT: PREVENTS OR INTERFERES WITH MANY ACTIVE NOT PASSIVE ACTIVITIES
PAIN_FUNCTIONAL_ASSESSMENT: PREVENTS OR INTERFERES WITH ALL ACTIVE AND SOME PASSIVE ACTIVITIES

## 2024-07-05 ASSESSMENT — PAIN SCALES - GENERAL
PAINLEVEL_OUTOF10: 4
PAINLEVEL_OUTOF10: 5
PAINLEVEL_OUTOF10: 7
PAINLEVEL_OUTOF10: 0
PAINLEVEL_OUTOF10: 7
PAINLEVEL_OUTOF10: 7

## 2024-07-05 ASSESSMENT — PAIN DESCRIPTION - ORIENTATION
ORIENTATION: LEFT

## 2024-07-05 ASSESSMENT — PAIN DESCRIPTION - DESCRIPTORS
DESCRIPTORS: ACHING
DESCRIPTORS: ACHING;THROBBING;TIGHTNESS
DESCRIPTORS: ACHING
DESCRIPTORS: ACHING
DESCRIPTORS: DISCOMFORT

## 2024-07-05 ASSESSMENT — PAIN DESCRIPTION - FREQUENCY
FREQUENCY: CONTINUOUS

## 2024-07-05 ASSESSMENT — PAIN DESCRIPTION - PAIN TYPE
TYPE: SURGICAL PAIN

## 2024-07-05 ASSESSMENT — PAIN DESCRIPTION - ONSET
ONSET: ON-GOING
ONSET: ON-GOING
ONSET: PROGRESSIVE
ONSET: ON-GOING

## 2024-07-05 ASSESSMENT — PAIN DESCRIPTION - LOCATION
LOCATION: WRIST
LOCATION: WRIST
LOCATION: ARM
LOCATION: WRIST

## 2024-07-05 ASSESSMENT — ENCOUNTER SYMPTOMS: SHORTNESS OF BREATH: 0

## 2024-07-05 NOTE — ANESTHESIA PRE PROCEDURE
daily for 90 days. 60 capsule 2    cyclobenzaprine (FLEXERIL) 10 MG tablet TAKE ONE TABLET BY MOUTH ONCE NIGHTLY AS NEEDED FOR MUSCLE SPASMS 21 tablet 0    Ascorbic Acid (VITAMIN C) 1000 MG tablet TAKE 1 TABLET BY MOUTH DAILY 60 tablet 2    IBUPROFEN IB PO Take by mouth      SUMAtriptan (IMITREX) 50 MG tablet Take 1 tablet by mouth as needed for Migraine (Patient not taking: Reported on 2024) 9 tablet 2     Current Facility-Administered Medications   Medication Dose Route Frequency Provider Last Rate Last Admin    ceFAZolin (ANCEF) 2,000 mg in sterile water 20 mL IV syringe  2,000 mg IntraVENous Once Gurvinder Barnett MD        lactated ringers IV soln infusion   IntraVENous Continuous Jeff Rodriguez  mL/hr at 24 New Bag at 24     Vital Signs (Current)   Vitals:    24 1251 24   BP:  135/87   Pulse:  66   Resp:  14   Temp:  36.1 °C (97 °F)   TempSrc:  Temporal   SpO2:  98%   Weight: 99.8 kg (220 lb) 100.1 kg (220 lb 9.6 oz)   Height: 1.803 m (5' 11\") 1.803 m (5' 11\")                                            Vital Signs Statistics (for past 48 hrs)     Temp  Av.1 °C (97 °F)  Min: 36.1 °C (97 °F)   Min taken time: 24  Max: 36.1 °C (97 °F)   Max taken time: 24  Pulse  Av  Min: 66   Min taken time: 24  Max: 66   Max taken time: 24  Resp  Av  Min: 14   Min taken time: 24  Max: 14   Max taken time: 24  BP  Min: 135/87   Min taken time: 24  Max: 135/87   Max taken time: 24  MAP (mmHg)  Av  Min: 102   Min taken time: 24  Max: 102   Max taken time: 24  SpO2  Av %  Min: 98 %   Min taken time: 24  Max: 98 %   Max taken time: 24  BP Readings from Last 3 Encounters:   24 135/87   24 (!) 160/104   24 (!) 165/92       BMI  Body mass index is 30.77 kg/m².  Estimated body mass index is 30.77 kg/m² as 
kg (218 lb 12.8 oz)   06/06/24 99.8 kg (220 lb)     Body mass index is 30.77 kg/m².    CBC:   Lab Results   Component Value Date/Time    WBC 13.1 10/23/2023 05:29 AM    RBC 4.58 10/23/2023 05:29 AM    HGB 13.8 10/23/2023 05:29 AM    HCT 39.9 10/23/2023 05:29 AM    MCV 87.1 10/23/2023 05:29 AM    RDW 13.0 10/23/2023 05:29 AM     10/23/2023 05:29 AM       CMP:   Lab Results   Component Value Date/Time     10/23/2023 05:29 AM    K 4.1 10/23/2023 05:29 AM     10/23/2023 05:29 AM    CO2 22 10/23/2023 05:29 AM    BUN 11 10/23/2023 05:29 AM    CREATININE 0.9 10/23/2023 05:29 AM    AGRATIO 2.0 08/16/2023 09:02 AM    LABGLOM >60 10/23/2023 05:29 AM    GLUCOSE 111 10/23/2023 05:29 AM    CALCIUM 8.0 10/23/2023 05:29 AM    BILITOT 0.3 08/16/2023 09:02 AM    ALKPHOS 71 08/16/2023 09:02 AM    AST 20 08/16/2023 09:02 AM    ALT 24 08/16/2023 09:02 AM       POC Tests: No results for input(s): \"POCGLU\", \"POCNA\", \"POCK\", \"POCCL\", \"POCBUN\", \"POCHEMO\", \"POCHCT\" in the last 72 hours.    Coags:   Lab Results   Component Value Date/Time    PROTIME 12.3 10/22/2023 01:06 AM    INR 0.92 10/22/2023 01:06 AM       HCG (If Applicable): No results found for: \"PREGTESTUR\", \"PREGSERUM\", \"HCG\", \"HCGQUANT\"     ABGs: No results found for: \"PHART\", \"PO2ART\", \"RWX8FJX\", \"DME3WVH\", \"BEART\", \"B8GHRIWG\"     Type & Screen (If Applicable):  No results found for: \"LABABO\"    Drug/Infectious Status (If Applicable):  No results found for: \"HIV\", \"HEPCAB\"    COVID-19 Screening (If Applicable): No results found for: \"COVID19\"        Anesthesia Evaluation  Patient summary reviewed and Nursing notes reviewed  Airway: Mallampati: II  TM distance: >3 FB   Neck ROM: full  Mouth opening: > = 3 FB   Dental: normal exam         Pulmonary: breath sounds clear to auscultation                             Cardiovascular:  Exercise tolerance: good (>4 METS)  (+) hypertension:        Rhythm: regular  Rate: normal                    Neuro/Psych:   (+)

## 2024-07-05 NOTE — BRIEF OP NOTE
Brief Postoperative Note      Patient: Carson Goldstein  YOB: 1990  MRN: [id number]    Date of Procedure: 7/5/2024    Pre-Op Diagnosis Codes:     * Left wrist pain [M25.532], symptomatic deep hardware left wrist and forearm    Post-Op Diagnosis: Same       Procedure:   Removal of symptomatic hardware, left wrist and forearm  Interpretation of fluoroscopy 3 views left wrist    Surgeon(s):  Gurvinder Barnett MD    Assistant:  Surgical Assistant: Fifi Youssef    Anesthesia: General, local     Estimated Blood Loss (mL): 10ml     Complications: None immediate apparent     Specimens:   none    Implants:  none      Drains: none    Findings:  Infection Present At Time Of Surgery (PATOS) (choose all levels that have infection present):  No infection present  Other Findings: see fully dictated operative report     Electronically signed by Gurvinder Barnett MD on 7/5/2024 at 9:09 AM

## 2024-07-05 NOTE — ANESTHESIA PROCEDURE NOTES
Peripheral Block    Patient location during procedure: post-op  Reason for block: procedure for pain and post-op pain management  Start time: 7/5/2024 9:28 AM  End time: 7/5/2024 9:32 AM  Staffing  Performed: anesthesiologist   Anesthesiologist: Blue Bright MD  Performed by: lBue Bright MD  Authorized by: Blue Bright MD    Preanesthetic Checklist  Completed: patient identified, IV checked, site marked, risks and benefits discussed, surgical/procedural consents, equipment checked, pre-op evaluation, timeout performed, anesthesia consent given, oxygen available, monitors applied/VS acknowledged, fire risk safety assessment completed and verbalized and blood product R/B/A discussed and consented  Peripheral Block   Patient position: supine  Prep: ChloraPrep  Provider prep: mask and sterile gloves  Patient monitoring: cardiac monitor, continuous capnometry, continuous pulse ox, frequent blood pressure checks, IV access, oxygen and responsive to questions  Block type: Brachial plexus  Supraclavicular  Laterality: left  Injection technique: single-shot  Guidance: ultrasound guided    Needle   Needle type: insulated echogenic nerve stimulator needle   Needle gauge: 22 G  Needle localization: ultrasound guidance  Needle length: 8 cm  Assessment   Injection assessment: negative aspiration for heme, no paresthesia on injection, local visualized surrounding nerve on ultrasound and no intravascular symptoms  Paresthesia pain: none  Slow fractionated injection: yes  Hemodynamics: stable  Outcomes: uncomplicated and patient tolerated procedure well    Additional Notes  0.5% bupivacaine 10 ml + exparel 10 ml, mixed and injected in 2 ml increments with negative aspiration between. No complications.  Medications Administered  BUPivacaine (MARCAINE) PF injection 0.5% - Perineural   10 mL - 7/5/2024 9:28:00 AM  BUPivacaine liposome (EXPAREL) injection 1.3% - Perineural   10 mL - 7/5/2024 9:28:00 AM

## 2024-07-05 NOTE — PROGRESS NOTES
6/26/24 @ 1300 Pt confirms will be seeing PCP Dr. Aparicio [phone] tomorrow 6/27 for PreOp. MD  
Ambulatory Surgery/Procedure Discharge Note    Vitals:    07/05/24 1032   BP: 126/84   Pulse: 57   Resp: 13   Temp: 97.1 °F (36.2 °C)   SpO2: 92%       In: 1600 [I.V.:1600]  Out: 10     Restroom use offered before discharge.  Yes    Pain assessment:  present - adequately treated  Pain Level: 5    Pt a&o x4. VSS. Pt c/o 5/10 pain, refer to flow sheet. Notified anes and received verbal order to give pt percocet, refer to MAR. Surgical site C/D/I with ace wrap and gauze. Pt denies nausea. Reviewed d/c instructions and removed IV.      Patient discharged to home/self care. Patient discharged via wheel chair by transporter to waiting family/S.O.       7/5/2024 11:22 AM  
PACU RN called and updated patient's family.   
PACU Transfer to [facility]    Vitals:    07/05/24 1015   BP: 134/73   Pulse: 77   Resp: 13   Temp: 97.5 °F (36.4 °C)   SpO2: 97%         Intake/Output Summary (Last 24 hours) at 7/5/2024 1024  Last data filed at 7/5/2024 0910  Gross per 24 hour   Intake 1600 ml   Output 10 ml   Net 1590 ml       Pain assessment:  present - adequately treated  Pain Level: 4 (tolerable)    Patient has all belongings at discharge from PACU.  PACU RN called and updated patient's family.     Patient transferred via STRET[facility]ER per [facility] to care of HEATHER FAJARDO.      
Patient arrived to PACU post REMOVAL OF SYMPTOMATIC HARDWARE LEFT WRIST AND FOREARM, EXPLORATION OF EXTENSOR TENDON WITH DEBRIDEMENT AS INDICATED - Left with Dr. Barnett. SONUS on arrival. CRNA gave PACU RN report at bedside stating no complications during procedure. Dressing to surgical site clean,dry and intact. Patient shows no signs of pain at this time. Will continue to monitor.   
eating/ drinking as you will have very specific instructions to follow.  If you did not receive these, call your surgeon's office immediately.     5. MEDICATIONS:  Take the following medications with a SMALL sip of water:   Use your usual dose of inhalers the morning of surgery. BRING your rescue inhaler with you to hospital.   Anesthesia does NOT want you to take insulin the morning of surgery. They will control your blood sugar while you are at the hospital. Please contact your ordering physician for instructions regarding your insulin the night before your procedure. If you have an insulin pump, please keep it set on basal rate.   Bariatric patient's call your surgeon if on diabetic medications as some may need to be stopped 1 week prior to surgery    6. Do not swallow additional water when brushing teeth. No gum, candy, mints, or ice chips. Refrain from smoking or at least decrease the amount on day of surgery.    7. Morning of surgery:   Take a shower with an antibacterial soap (i.e., Safeguard or Dial) OR your physician may have instructed you to use Hibiclens.  Dress in loose, comfortable clothing appropriate for redressing after your procedure.   Do not wear jewelry (including body piercings), make-up (especially NO eye make-up), fingernail polish (NO toenail polish if foot/leg surgery), lotion, powders, or metal hairclips.   Do not shave or wax for 72 hours prior to procedure near your operative site. Shaving with a razor can irritate your skin and make it easier to develop an infection. On the day of your procedure, any hair that needs to be removed near the surgical site will be 'clipped' by a healthcare worker using a special clipper designed to avoid skin irritation.    8. Dentures, glasses, or contacts will need to be removed before your procedure. Bring cases for your glasses, contacts, dentures, or hearing aids to protect them while you are in surgery.      9. If you use a CPAP, please bring it with

## 2024-07-05 NOTE — DISCHARGE INSTRUCTIONS
[facility]   Post op Instructions for Hand and Upper Extremity Surgery    * Keep dressing dry and clean. It is ok to Shower just keep Dressing dry.  * Elevate operative arm.  * Ice 20 minutes on 20 minutes off.  * Follow-up appointment as scheduled by office staff. Check paperwork from office.    If no appointment scheduled call the office.  * If dressing is too tight, you may loosen Ace bandage and leave splint in place.  * Sling, as needed  * If you have any questions, refer to the office number listed below.    [phone]  ([facility] Administrative Office)    PERIPHERAL NERVE BLOCK INSTRUCTIONS     Please remember while having a nerve block you are at an increased risk for BALANCE ISSUES AND FALLS  You were given a nerve block today from the anesthesiologist. Most nerve blocks last anywhere from 6-36 hours.  You should start taking your pain medication before the block wears off or when you first begin feeling discomfort. It takes at least 30-60 minutes for a pain pill to take effect. Pain medications should be taken with food.  Consider setting an alarm through the night to help manage your pain level so you do not wake up with too much pain.   Pain medicines can cause more sedation and decrease your breathing so ONLY take as directed. If you have Sleep Apnea, you need to use your C-Pap machine.   What to expect after a nerve block:    Numbness, tingling- affected area feels heavy or asleep   Weakness or inability to move or control your affected area   Inability to feel temperature changes to your affected area  Usually the weakness wears off first, followed by the tingling or heaviness. You may notice more pain at this point and should start taking your pain meds.   If you had a shoulder block, you may experience:   Mild shortness of breath (may be relieved by sitting up in a chair or recliner)   Hoarse voice   Blurry vision   Unequal pupils   Drooping of your face (eye or

## 2024-07-05 NOTE — H&P
I have reviewed the history and physical and examined the patient and find no relevant changes.    I have reviewed with the patient and/or family the risks, benefits, and alternatives to the procedure.    Gurvinder Barnett MD  7/5/2024

## 2024-07-05 NOTE — OP NOTE
JDLab system.  The plate that was most ulnar and volar was then identified, and the 2 screws that were protruding through the dorsal cortex of the ulna were identified and removed with the appropriate screwdriver.  There appeared to be no other injuries or changes.  We left this plate and remaining plate and screws in place.  Next, we turned our attention volarly and again using the incision necessary for exposure to the symptomatic screws, an incision was made.  We carefully dissected through skin and subcutaneous tissue, protecting radial artery and median nerve and dissected down to the volar cortex of the radius.  The 2 screws were identified.  The 1st and 3rd screw from the distal-most portion appeared to have evidence of protrusion of the dorsal cortex by several millimeters, and these were removed without difficulty with the appropriate screwdriver as well.  No other changes were noted.  The tourniquet was deflated at this point, and all the wounds were irrigated copiously with normal saline irrigation.  Retinacular closure did not appear to be necessary at this point.  The EPL tendon was left without closure of the 3rd dorsal compartment to allow for free gliding.  Then after this, hemostasis was achieved and closure performed in a layered fashion with 4-0 Vicryl for subcutaneous closure and 4-0 nylon for all wounds.  Compartments were soft and compressible throughout.  At the end of the procedure, there was a palpable radial pulse and brisk capillary refill of all fingers.  A sterile dressing and soft sterile overwrap were then applied.  The patient tolerated the procedure well, was awoken, and taken to the PACU in stable condition.    POSTOPERATIVE PLAN:  The patient will remain in this soft dressing.  Keep clean, dry, and intact.  Okay for gentle finger and wrist range of motion, but avoid heavy lifting, gripping, and stress of left upper extremity.  Plan for followup in approximately 10-14 days for

## 2024-07-05 NOTE — ANESTHESIA POSTPROCEDURE EVALUATION
Department of Anesthesiology  Postprocedure Note    Patient: Carson Goldstein  MRN: [id number]  YOB: 1990  Date of evaluation: 7/5/2024    Procedure Summary       Date: 07/05/24 Room / Location: [place] [address] / [facility]    Anesthesia Start: 0736 Anesthesia Stop: 0916    Procedure: REMOVAL OF SYMPTOMATIC HARDWARE LEFT WRIST AND FOREARM, EXPLORATION OF EXTENSOR TENDON WITH DEBRIDEMENT AS INDICATED (Left: Wrist) Diagnosis:       Left wrist pain      (Left wrist pain [M25.532])    Surgeons: Gurvinder Barnett MD Responsible Provider: Blue Bright MD    Anesthesia Type: general ASA Status: 2            Anesthesia Type: No value filed.    Neeru Phase I: Neeru Score: 10    Neeru Phase II:      Anesthesia Post Evaluation    Patient location during evaluation: PACU  Patient participation: complete - patient participated  Level of consciousness: awake  Pain score: 4  Nausea & Vomiting: no nausea and no vomiting  Cardiovascular status: blood pressure returned to baseline  Respiratory status: acceptable  Hydration status: euvolemic  Comments: Patient in significant pain postoperatively. Quoted pain as \"7.5\"/10, after block performed (good visualization, easy block) pain only decreased to 4/10. However, motor function inhibited from block.  Pain management: adequate    No notable events documented.

## 2024-07-18 DIAGNOSIS — K21.9 GASTROESOPHAGEAL REFLUX DISEASE, UNSPECIFIED WHETHER ESOPHAGITIS PRESENT: ICD-10-CM

## 2024-07-18 RX ORDER — OMEPRAZOLE 20 MG/1
20 CAPSULE, DELAYED RELEASE ORAL 2 TIMES DAILY
Qty: 180 CAPSULE | Refills: 1 | Status: SHIPPED | OUTPATIENT
Start: 2024-07-18

## 2024-08-15 ENCOUNTER — OFFICE VISIT (OUTPATIENT)
Dept: FAMILY MEDICINE CLINIC | Age: 34
End: 2024-08-15
Payer: COMMERCIAL

## 2024-08-15 VITALS
SYSTOLIC BLOOD PRESSURE: 150 MMHG | DIASTOLIC BLOOD PRESSURE: 82 MMHG | HEIGHT: 71 IN | BODY MASS INDEX: 30.88 KG/M2 | HEART RATE: 82 BPM | OXYGEN SATURATION: 97 % | WEIGHT: 220.6 LBS

## 2024-08-15 DIAGNOSIS — R10.84 GENERALIZED ABDOMINAL PAIN: ICD-10-CM

## 2024-08-15 DIAGNOSIS — K52.9 CHRONIC DIARRHEA: Primary | ICD-10-CM

## 2024-08-15 PROCEDURE — 4004F PT TOBACCO SCREEN RCVD TLK: CPT | Performed by: SURGERY

## 2024-08-15 PROCEDURE — 3077F SYST BP >= 140 MM HG: CPT | Performed by: SURGERY

## 2024-08-15 PROCEDURE — 3079F DIAST BP 80-89 MM HG: CPT | Performed by: SURGERY

## 2024-08-15 PROCEDURE — 99213 OFFICE O/P EST LOW 20 MIN: CPT | Performed by: SURGERY

## 2024-08-15 PROCEDURE — G8417 CALC BMI ABV UP PARAM F/U: HCPCS | Performed by: SURGERY

## 2024-08-15 PROCEDURE — G8427 DOCREV CUR MEDS BY ELIG CLIN: HCPCS | Performed by: SURGERY

## 2024-08-15 ASSESSMENT — ENCOUNTER SYMPTOMS
DIARRHEA: 1
CONSTIPATION: 0
VOMITING: 0
ABDOMINAL PAIN: 1
RECTAL PAIN: 0
BLOOD IN STOOL: 1

## 2024-08-15 NOTE — PROGRESS NOTES
8/15/2024    This is a 34 y.o. male   Chief Complaint   Patient presents with    Irritable Bowel Syndrome     Diarrhea on going, nausea, 2 + month, mucus in the diarrhea    Suture / Staple Removal     Looks like he still has a few sutures in his arm   .    Has had problems with diarrhea on and off for many many years however he has had persistent diarrhea for the last 2 months with no normal bowel movements in between.  He notes first thing in the morning he wakes up and spends 20 to 30 minutes on the toilet with loose mucousy bowels he does note occasional blood in the diarrhea this is red blood and can be mixed in with loose stool.  He has not been worked up for this in the past.  He does report cramping abdominal pain followed by episodes of loose.  Worst in the morning but can happen throughout the day.          Patient Active Problem List   Diagnosis    Essential hypertension    Intractable migraine without aura and without status migrainosus    Smoker    Wrist fracture, closed, left, initial encounter    Open wrist fracture, left, initial encounter    Gastroesophageal reflux disease       Current Outpatient Medications   Medication Sig Dispense Refill    omeprazole (PRILOSEC) 20 MG delayed release capsule TAKE 1 CAPSULE BY MOUTH 2 TIMES A  capsule 1    medical marijuana Inhale 1 each into the lungs as needed (migraines).      losartan-hydroCHLOROthiazide (HYZAAR) 100-12.5 MG per tablet Take 1 tablet by mouth daily 90 tablet 1    cyclobenzaprine (FLEXERIL) 10 MG tablet TAKE ONE TABLET BY MOUTH ONCE NIGHTLY AS NEEDED FOR MUSCLE SPASMS 21 tablet 0    Ascorbic Acid (VITAMIN C) 1000 MG tablet TAKE 1 TABLET BY MOUTH DAILY 60 tablet 2    IBUPROFEN IB PO Take by mouth      SUMAtriptan (IMITREX) 50 MG tablet Take 1 tablet by mouth as needed for Migraine 9 tablet 2    gabapentin (NEURONTIN) 100 MG capsule Take 1 capsule by mouth 2 times daily for 90 days. (Patient not taking: Reported on 8/15/2024) 60 capsule 2

## 2024-08-28 DIAGNOSIS — K62.5 RECTAL BLEED: Primary | ICD-10-CM

## 2024-08-28 DIAGNOSIS — R13.14 PHARYNGOESOPHAGEAL DYSPHAGIA: ICD-10-CM

## 2024-08-28 DIAGNOSIS — R19.7 DIARRHEA OF PRESUMED INFECTIOUS ORIGIN: ICD-10-CM

## 2024-09-05 DIAGNOSIS — R19.7 DIARRHEA OF PRESUMED INFECTIOUS ORIGIN: ICD-10-CM

## 2024-09-05 DIAGNOSIS — K62.5 RECTAL BLEED: ICD-10-CM

## 2024-09-10 DIAGNOSIS — K62.5 RECTAL BLEED: ICD-10-CM

## 2024-09-19 ENCOUNTER — ANESTHESIA EVENT (OUTPATIENT)
Dept: ENDOSCOPY | Age: 34
End: 2024-09-19
Payer: COMMERCIAL

## 2024-09-20 ENCOUNTER — APPOINTMENT (OUTPATIENT)
Dept: CT IMAGING | Age: 34
End: 2024-09-20
Attending: INTERNAL MEDICINE
Payer: COMMERCIAL

## 2024-09-20 ENCOUNTER — ANESTHESIA (OUTPATIENT)
Dept: ENDOSCOPY | Age: 34
End: 2024-09-20
Payer: COMMERCIAL

## 2024-09-20 ENCOUNTER — HOSPITAL ENCOUNTER (OUTPATIENT)
Age: 34
Setting detail: OUTPATIENT SURGERY
Discharge: HOME | End: 2024-09-20
Attending: INTERNAL MEDICINE | Admitting: INTERNAL MEDICINE
Payer: COMMERCIAL

## 2024-09-20 VITALS
DIASTOLIC BLOOD PRESSURE: 86 MMHG | OXYGEN SATURATION: 96 % | TEMPERATURE: 97.8 F | WEIGHT: 222 LBS | SYSTOLIC BLOOD PRESSURE: 161 MMHG | BODY MASS INDEX: 31.08 KG/M2 | HEART RATE: 60 BPM | RESPIRATION RATE: 16 BRPM | HEIGHT: 71 IN

## 2024-09-20 DIAGNOSIS — K52.9 CHRONIC DIARRHEA: ICD-10-CM

## 2024-09-20 PROCEDURE — 3609010600 HC COLONOSCOPY POLYPECTOMY SNARE/COLD BIOPSY: Performed by: INTERNAL MEDICINE

## 2024-09-20 PROCEDURE — 3609010300 HC COLONOSCOPY W/BIOPSY SINGLE/MULTIPLE: Performed by: INTERNAL MEDICINE

## 2024-09-20 PROCEDURE — 3700000001 HC ADD 15 MINUTES (ANESTHESIA): Performed by: INTERNAL MEDICINE

## 2024-09-20 PROCEDURE — 3700000000 HC ANESTHESIA ATTENDED CARE: Performed by: INTERNAL MEDICINE

## 2024-09-20 PROCEDURE — 88305 TISSUE EXAM BY PATHOLOGIST: CPT

## 2024-09-20 PROCEDURE — 6360000004 HC RX CONTRAST MEDICATION: Performed by: INTERNAL MEDICINE

## 2024-09-20 PROCEDURE — 6360000002 HC RX W HCPCS: Performed by: NURSE ANESTHETIST, CERTIFIED REGISTERED

## 2024-09-20 PROCEDURE — 2500000003 HC RX 250 WO HCPCS: Performed by: NURSE ANESTHETIST, CERTIFIED REGISTERED

## 2024-09-20 PROCEDURE — 7100000011 HC PHASE II RECOVERY - ADDTL 15 MIN: Performed by: INTERNAL MEDICINE

## 2024-09-20 PROCEDURE — 2709999900 HC NON-CHARGEABLE SUPPLY: Performed by: INTERNAL MEDICINE

## 2024-09-20 PROCEDURE — 74178 CT ABD&PLV WO CNTR FLWD CNTR: CPT

## 2024-09-20 PROCEDURE — 7100000010 HC PHASE II RECOVERY - FIRST 15 MIN: Performed by: INTERNAL MEDICINE

## 2024-09-20 PROCEDURE — 3609012400 HC EGD TRANSORAL BIOPSY SINGLE/MULTIPLE: Performed by: INTERNAL MEDICINE

## 2024-09-20 PROCEDURE — 2580000003 HC RX 258: Performed by: STUDENT IN AN ORGANIZED HEALTH CARE EDUCATION/TRAINING PROGRAM

## 2024-09-20 RX ORDER — IOPAMIDOL 755 MG/ML
75 INJECTION, SOLUTION INTRAVASCULAR
Status: COMPLETED | OUTPATIENT
Start: 2024-09-20 | End: 2024-09-20

## 2024-09-20 RX ORDER — SODIUM CHLORIDE, SODIUM LACTATE, POTASSIUM CHLORIDE, CALCIUM CHLORIDE 600; 310; 30; 20 MG/100ML; MG/100ML; MG/100ML; MG/100ML
INJECTION, SOLUTION INTRAVENOUS CONTINUOUS
Status: DISCONTINUED | OUTPATIENT
Start: 2024-09-20 | End: 2024-09-20 | Stop reason: HOSPADM

## 2024-09-20 RX ORDER — PROPOFOL 10 MG/ML
INJECTION, EMULSION INTRAVENOUS
Status: DISCONTINUED | OUTPATIENT
Start: 2024-09-20 | End: 2024-09-20 | Stop reason: SDUPTHER

## 2024-09-20 RX ORDER — SODIUM CHLORIDE 9 MG/ML
INJECTION, SOLUTION INTRAVENOUS PRN
Status: DISCONTINUED | OUTPATIENT
Start: 2024-09-20 | End: 2024-09-20 | Stop reason: HOSPADM

## 2024-09-20 RX ORDER — LIDOCAINE HYDROCHLORIDE 10 MG/ML
0.3 INJECTION, SOLUTION EPIDURAL; INFILTRATION; INTRACAUDAL; PERINEURAL
Status: DISCONTINUED | OUTPATIENT
Start: 2024-09-20 | End: 2024-09-20 | Stop reason: HOSPADM

## 2024-09-20 RX ORDER — SODIUM CHLORIDE 0.9 % (FLUSH) 0.9 %
5-40 SYRINGE (ML) INJECTION PRN
Status: DISCONTINUED | OUTPATIENT
Start: 2024-09-20 | End: 2024-09-20 | Stop reason: HOSPADM

## 2024-09-20 RX ORDER — LIDOCAINE HYDROCHLORIDE 20 MG/ML
INJECTION, SOLUTION EPIDURAL; INFILTRATION; INTRACAUDAL; PERINEURAL
Status: DISCONTINUED | OUTPATIENT
Start: 2024-09-20 | End: 2024-09-20 | Stop reason: SDUPTHER

## 2024-09-20 RX ORDER — SODIUM CHLORIDE 0.9 % (FLUSH) 0.9 %
5-40 SYRINGE (ML) INJECTION EVERY 12 HOURS SCHEDULED
Status: DISCONTINUED | OUTPATIENT
Start: 2024-09-20 | End: 2024-09-20 | Stop reason: HOSPADM

## 2024-09-20 RX ADMIN — LIDOCAINE HYDROCHLORIDE 65 MG: 20 INJECTION, SOLUTION EPIDURAL; INFILTRATION; INTRACAUDAL; PERINEURAL at 13:59

## 2024-09-20 RX ADMIN — PROPOFOL 100 MG: 10 INJECTION, EMULSION INTRAVENOUS at 13:59

## 2024-09-20 RX ADMIN — PROPOFOL 25 MG: 10 INJECTION, EMULSION INTRAVENOUS at 14:13

## 2024-09-20 RX ADMIN — SODIUM CHLORIDE, POTASSIUM CHLORIDE, SODIUM LACTATE AND CALCIUM CHLORIDE: 600; 310; 30; 20 INJECTION, SOLUTION INTRAVENOUS at 13:07

## 2024-09-20 RX ADMIN — PROPOFOL 150 MCG/KG/MIN: 10 INJECTION, EMULSION INTRAVENOUS at 14:00

## 2024-09-20 RX ADMIN — IOPAMIDOL 75 ML: 755 INJECTION, SOLUTION INTRAVENOUS at 15:59

## 2024-09-20 RX ADMIN — PROPOFOL 75 MG: 10 INJECTION, EMULSION INTRAVENOUS at 14:01

## 2024-09-20 ASSESSMENT — PAIN DESCRIPTION - LOCATION: LOCATION: ABDOMEN

## 2024-09-20 ASSESSMENT — PAIN SCALES - GENERAL
PAINLEVEL_OUTOF10: 7

## 2024-09-20 ASSESSMENT — PAIN - FUNCTIONAL ASSESSMENT: PAIN_FUNCTIONAL_ASSESSMENT: 0-10

## 2024-09-20 ASSESSMENT — PAIN DESCRIPTION - PAIN TYPE
TYPE: ACUTE PAIN
TYPE: ACUTE PAIN

## 2024-09-20 ASSESSMENT — PAIN DESCRIPTION - ORIENTATION: ORIENTATION: LOWER

## 2024-10-22 ENCOUNTER — OFFICE VISIT (OUTPATIENT)
Dept: URGENT CARE | Age: 34
End: 2024-10-22

## 2024-10-22 VITALS
WEIGHT: 225 LBS | OXYGEN SATURATION: 94 % | TEMPERATURE: 98.5 F | SYSTOLIC BLOOD PRESSURE: 136 MMHG | HEART RATE: 98 BPM | DIASTOLIC BLOOD PRESSURE: 72 MMHG | HEIGHT: 71 IN | BODY MASS INDEX: 31.5 KG/M2

## 2024-10-22 DIAGNOSIS — R35.0 URINARY FREQUENCY: ICD-10-CM

## 2024-10-22 DIAGNOSIS — N39.9 URINARY PROBLEM IN MALE: ICD-10-CM

## 2024-10-22 DIAGNOSIS — N30.00 ACUTE CYSTITIS WITHOUT HEMATURIA: Primary | ICD-10-CM

## 2024-10-22 PROBLEM — K92.0 HEMATEMESIS: Status: RESOLVED | Noted: 2024-03-23 | Resolved: 2024-10-22

## 2024-10-22 LAB
BILIRUBIN, POC: NORMAL
BLOOD URINE, POC: NORMAL
CLARITY, POC: CLEAR
COLOR, POC: YELLOW
GLUCOSE URINE, POC: NORMAL MG/DL
KETONES, POC: NORMAL MG/DL
LEUKOCYTE EST, POC: NORMAL
NITRITE, POC: NORMAL
PH, POC: 5.5
PROTEIN, POC: NORMAL MG/DL
SPECIFIC GRAVITY, POC: 1.03
UROBILINOGEN, POC: NORMAL

## 2024-10-22 RX ORDER — CEPHALEXIN 500 MG/1
500 CAPSULE ORAL 2 TIMES DAILY
Qty: 14 CAPSULE | Refills: 0 | Status: SHIPPED | OUTPATIENT
Start: 2024-10-22 | End: 2024-10-29

## 2024-10-22 ASSESSMENT — VISUAL ACUITY: OU: 1

## 2024-10-22 NOTE — PATIENT INSTRUCTIONS
Urine culture sent to confirm, to identify pathogen, and to clarify sensitivities.   Will augment treatment plan as necessary pending culture results.  Keflex is prescribed for antibiotic treatment  Take the antibiotics until completed, do not stop unless otherwise directed by a healthcare provider.  Recommend daily yogurt or other probiotics while on antibiotics.  Increase water intake during treatment.  Can take ibuprofen (Motrin or Advil) or Acetaminophen (Tylenol) for pain symptoms.  Follow up with your PCP within 5 days or, if unable, you can return to the clinic if symptoms persist beyond 5 days or if symptoms worsen.    If fevers, shivering, nausea, vomiting, shortness of breath, chest pain, if severe abdominal or back pain develops, or if symptoms continue to worsen despite treatment plan, follow up with the ER for further evaluation.    New Prescriptions    CEPHALEXIN (KEFLEX) 500 MG CAPSULE    Take 1 capsule by mouth 2 times daily for 7 days

## 2024-10-22 NOTE — PROGRESS NOTES
Mouth/Throat:      Mouth: Mucous membranes are moist.   Eyes:      General: Vision grossly intact. Gaze aligned appropriately.      Extraocular Movements: Extraocular movements intact.      Conjunctiva/sclera: Conjunctivae normal.      Pupils: Pupils are equal, round, and reactive to light.   Cardiovascular:      Rate and Rhythm: Normal rate and regular rhythm.      Heart sounds: Normal heart sounds.   Pulmonary:      Effort: Pulmonary effort is normal.      Breath sounds: Normal breath sounds.   Abdominal:      General: Bowel sounds are normal. There is no distension.      Palpations: Abdomen is soft. There is no mass.      Tenderness: There is no abdominal tenderness. There is no right CVA tenderness, left CVA tenderness, guarding or rebound.   Genitourinary:     Comments: Pt defers exam  Musculoskeletal:      Cervical back: Normal range of motion and neck supple.   Skin:     General: Skin is warm and dry.   Neurological:      Mental Status: He is alert and oriented to person, place, and time.   Psychiatric:         Attention and Perception: Attention normal.         Behavior: Behavior is cooperative.       PROCEDURES:  Unless otherwise noted below, none     Procedures    RESULTS:  Results for orders placed or performed in visit on 10/22/24   POCT Urinalysis no Micro   Result Value Ref Range    Color, UA yellow     Clarity, UA clear     Glucose, UA POC neg mg/dL    Bilirubin, UA neg     Ketones, UA neg mg/dL    Spec Grav, UA 1.030     Blood, UA POC neg     pH, UA 5.5     Protein, UA POC neg mg/dL    Urobilinogen, UA      Leukocytes, UA trace     Nitrite, UA neg      An electronic signature was used to authenticate this note.    --Lai Kramer PA-C

## 2024-10-23 DIAGNOSIS — R10.13 EPIGASTRIC PAIN: Primary | ICD-10-CM

## 2024-10-24 DIAGNOSIS — I10 ESSENTIAL HYPERTENSION: ICD-10-CM

## 2024-10-24 LAB — BACTERIA UR CULT: NORMAL

## 2024-10-24 RX ORDER — LOSARTAN POTASSIUM AND HYDROCHLOROTHIAZIDE 12.5; 1 MG/1; MG/1
1 TABLET ORAL DAILY
Qty: 90 TABLET | Refills: 1 | Status: SHIPPED | OUTPATIENT
Start: 2024-10-24

## 2024-10-24 RX ORDER — SUMATRIPTAN 50 MG/1
TABLET, FILM COATED ORAL
Qty: 9 TABLET | Refills: 2 | Status: SHIPPED | OUTPATIENT
Start: 2024-10-24

## 2024-11-11 ENCOUNTER — HOSPITAL ENCOUNTER (OUTPATIENT)
Dept: ULTRASOUND IMAGING | Age: 34
Discharge: HOME | End: 2024-11-11
Attending: INTERNAL MEDICINE
Payer: COMMERCIAL

## 2024-11-11 DIAGNOSIS — R10.13 EPIGASTRIC PAIN: ICD-10-CM

## 2024-11-11 PROCEDURE — 76700 US EXAM ABDOM COMPLETE: CPT

## 2024-11-12 ENCOUNTER — TELEPHONE (OUTPATIENT)
Dept: FAMILY MEDICINE CLINIC | Age: 34
End: 2024-11-12

## 2024-11-12 ENCOUNTER — OFFICE VISIT (OUTPATIENT)
Dept: FAMILY MEDICINE CLINIC | Age: 34
End: 2024-11-12
Payer: COMMERCIAL

## 2024-11-12 VITALS
HEART RATE: 84 BPM | SYSTOLIC BLOOD PRESSURE: 134 MMHG | OXYGEN SATURATION: 97 % | BODY MASS INDEX: 31.66 KG/M2 | DIASTOLIC BLOOD PRESSURE: 72 MMHG | TEMPERATURE: 98.3 F | WEIGHT: 227 LBS | RESPIRATION RATE: 16 BRPM

## 2024-11-12 DIAGNOSIS — R09.82 POST-NASAL DRIP: ICD-10-CM

## 2024-11-12 DIAGNOSIS — R07.0 THROAT PAIN: Primary | ICD-10-CM

## 2024-11-12 DIAGNOSIS — K21.9 GASTROESOPHAGEAL REFLUX DISEASE, UNSPECIFIED WHETHER ESOPHAGITIS PRESENT: ICD-10-CM

## 2024-11-12 PROCEDURE — 3078F DIAST BP <80 MM HG: CPT | Performed by: STUDENT IN AN ORGANIZED HEALTH CARE EDUCATION/TRAINING PROGRAM

## 2024-11-12 PROCEDURE — G8427 DOCREV CUR MEDS BY ELIG CLIN: HCPCS | Performed by: STUDENT IN AN ORGANIZED HEALTH CARE EDUCATION/TRAINING PROGRAM

## 2024-11-12 PROCEDURE — G8484 FLU IMMUNIZE NO ADMIN: HCPCS | Performed by: STUDENT IN AN ORGANIZED HEALTH CARE EDUCATION/TRAINING PROGRAM

## 2024-11-12 PROCEDURE — 3075F SYST BP GE 130 - 139MM HG: CPT | Performed by: STUDENT IN AN ORGANIZED HEALTH CARE EDUCATION/TRAINING PROGRAM

## 2024-11-12 PROCEDURE — G8417 CALC BMI ABV UP PARAM F/U: HCPCS | Performed by: STUDENT IN AN ORGANIZED HEALTH CARE EDUCATION/TRAINING PROGRAM

## 2024-11-12 PROCEDURE — 99214 OFFICE O/P EST MOD 30 MIN: CPT | Performed by: STUDENT IN AN ORGANIZED HEALTH CARE EDUCATION/TRAINING PROGRAM

## 2024-11-12 PROCEDURE — 4004F PT TOBACCO SCREEN RCVD TLK: CPT | Performed by: STUDENT IN AN ORGANIZED HEALTH CARE EDUCATION/TRAINING PROGRAM

## 2024-11-12 RX ORDER — LIDOCAINE HYDROCHLORIDE 20 MG/ML
15 SOLUTION OROPHARYNGEAL PRN
Qty: 100 ML | Refills: 0 | Status: SHIPPED | OUTPATIENT
Start: 2024-11-12 | End: 2024-11-12

## 2024-11-12 RX ORDER — FAMOTIDINE 20 MG/1
20 TABLET, FILM COATED ORAL NIGHTLY PRN
Qty: 60 TABLET | Refills: 3 | Status: SHIPPED | OUTPATIENT
Start: 2024-11-12

## 2024-11-12 RX ORDER — FLUTICASONE PROPIONATE 50 MCG
1 SPRAY, SUSPENSION (ML) NASAL DAILY
Qty: 16 G | Refills: 0 | Status: SHIPPED | OUTPATIENT
Start: 2024-11-12

## 2024-11-12 RX ORDER — PANTOPRAZOLE SODIUM 20 MG/1
20 TABLET, DELAYED RELEASE ORAL
Qty: 120 TABLET | Refills: 0 | Status: SHIPPED | OUTPATIENT
Start: 2024-11-12

## 2024-11-12 RX ORDER — LIDOCAINE HYDROCHLORIDE 20 MG/ML
SOLUTION OROPHARYNGEAL
Qty: 100 ML | Refills: 0 | Status: SHIPPED | OUTPATIENT
Start: 2024-11-12

## 2024-11-12 SDOH — ECONOMIC STABILITY: FOOD INSECURITY: WITHIN THE PAST 12 MONTHS, YOU WORRIED THAT YOUR FOOD WOULD RUN OUT BEFORE YOU GOT MONEY TO BUY MORE.: PATIENT DECLINED

## 2024-11-12 SDOH — ECONOMIC STABILITY: INCOME INSECURITY: HOW HARD IS IT FOR YOU TO PAY FOR THE VERY BASICS LIKE FOOD, HOUSING, MEDICAL CARE, AND HEATING?: PATIENT DECLINED

## 2024-11-12 SDOH — ECONOMIC STABILITY: TRANSPORTATION INSECURITY
IN THE PAST 12 MONTHS, HAS LACK OF TRANSPORTATION KEPT YOU FROM MEETINGS, WORK, OR FROM GETTING THINGS NEEDED FOR DAILY LIVING?: YES

## 2024-11-12 SDOH — ECONOMIC STABILITY: FOOD INSECURITY: WITHIN THE PAST 12 MONTHS, THE FOOD YOU BOUGHT JUST DIDN'T LAST AND YOU DIDN'T HAVE MONEY TO GET MORE.: PATIENT DECLINED

## 2024-11-12 ASSESSMENT — PATIENT HEALTH QUESTIONNAIRE - PHQ9
1. LITTLE INTEREST OR PLEASURE IN DOING THINGS: NOT AT ALL
SUM OF ALL RESPONSES TO PHQ QUESTIONS 1-9: 0
SUM OF ALL RESPONSES TO PHQ9 QUESTIONS 1 & 2: 0
SUM OF ALL RESPONSES TO PHQ QUESTIONS 1-9: 0
2. FEELING DOWN, DEPRESSED OR HOPELESS: NOT AT ALL
SUM OF ALL RESPONSES TO PHQ QUESTIONS 1-9: 0
SUM OF ALL RESPONSES TO PHQ QUESTIONS 1-9: 0

## 2024-11-12 NOTE — PATIENT INSTRUCTIONS
Stop omeprazole   -Start pantoprazole 20 mg twice daily , or 40 mg once daily   Increase to 40 mg twice daily as needed OR Pantoprazole 40 mg in the morning and famotidine 20 mg in the evening   Mouthwash as needed gargle - Swish and spit   Flonase nasal spray each nostril daily   Follow up with ENT

## 2024-11-12 NOTE — TELEPHONE ENCOUNTER
Rhonda called  They need clarification on the lidocaine mouth wash directions states - has conflicting directions has mls and  two teaspoonfuls

## 2024-11-12 NOTE — PROGRESS NOTES
[facility]  [address]  [address]  [place], [place] [address]  Tel: [phone]      2024       Subjective    SUBJECTIVE/OBJECTIVE  HPI    Carson Goldstein (:  1990) is a 34 y.o. male, here for evaluation of the following medical concerns:  Chief Complaint   Patient presents with    Pharyngitis       Patient had EGD 2 months ago. Reports history of acid reflux. No pain with sleep. Taking omeprazole 20 mg and afternoon. Has tried chewable pills. Tums. Has been on omeprazole for a 1/2 a year, Maloox   Has tried steroid, which has helped. Prednisone 5mg 5-6 pills twice daily for 2.5 dasy .     2024 - Has been having post nasal drip and saliva. Taking benadryl.       Sharp pain with cough. Roof to [facility] no psines     Pharyngitis  This is a new problem. The current episode started 1 to 4 weeks ago (2 weeeks). The problem has been gradually worsening. Associated symptoms include abdominal pain, anorexia, chest pain, congestion, nausea, a sore throat, swollen glands and vomiting. Pertinent negatives include no change in bowel habit (chronic diarrheea), coughing, fatigue, fever, myalgias or rash. The symptoms are aggravated by eating and drinking.   Gastroesophageal Reflux  He complains of abdominal pain, belching, chest pain, dysphagia, heartburn, nausea and a sore throat. He reports no coughing, no globus sensation or no hoarse voice. Pertinent negatives include no fatigue.    Has been using Halls cough drops   EGD 2024 - Hiatal hernia , normal esophagus   Endorses nausea vomting and heat burn symptoms.    No ibuprofen or NSAIDs.        Review of Systems   Constitutional:  Negative for fatigue and fever.   HENT:  Positive for congestion and sore throat. Negative for hoarse voice.    Respiratory:  Negative for cough.    Cardiovascular:  Positive for chest pain.   Gastrointestinal:  Positive for abdominal pain, anorexia, dysphagia, heartburn, nausea and vomiting.

## 2024-11-12 NOTE — TELEPHONE ENCOUNTER
Magic Mouthwash (MIRACLE MOUTHWASH) [[id number]]       Please send magic mouthwash to [facility].  [facility] can not fill compounding medication.    11/12/2024    Patient had appointment with dr de la fuente today

## 2024-11-15 ENCOUNTER — OFFICE VISIT (OUTPATIENT)
Dept: ENT CLINIC | Age: 34
End: 2024-11-15
Payer: COMMERCIAL

## 2024-11-15 VITALS
HEIGHT: 71 IN | WEIGHT: 227 LBS | DIASTOLIC BLOOD PRESSURE: 92 MMHG | BODY MASS INDEX: 31.78 KG/M2 | SYSTOLIC BLOOD PRESSURE: 145 MMHG | HEART RATE: 92 BPM

## 2024-11-15 DIAGNOSIS — K21.9 LARYNGOPHARYNGEAL REFLUX (LPR): Primary | ICD-10-CM

## 2024-11-15 DIAGNOSIS — R49.0 MUSCLE TENSION DYSPHONIA: ICD-10-CM

## 2024-11-15 DIAGNOSIS — J34.2 DEVIATED NASAL SEPTUM: ICD-10-CM

## 2024-11-15 PROCEDURE — 3077F SYST BP >= 140 MM HG: CPT | Performed by: STUDENT IN AN ORGANIZED HEALTH CARE EDUCATION/TRAINING PROGRAM

## 2024-11-15 PROCEDURE — G8484 FLU IMMUNIZE NO ADMIN: HCPCS | Performed by: STUDENT IN AN ORGANIZED HEALTH CARE EDUCATION/TRAINING PROGRAM

## 2024-11-15 PROCEDURE — G8417 CALC BMI ABV UP PARAM F/U: HCPCS | Performed by: STUDENT IN AN ORGANIZED HEALTH CARE EDUCATION/TRAINING PROGRAM

## 2024-11-15 PROCEDURE — G8427 DOCREV CUR MEDS BY ELIG CLIN: HCPCS | Performed by: STUDENT IN AN ORGANIZED HEALTH CARE EDUCATION/TRAINING PROGRAM

## 2024-11-15 PROCEDURE — 31575 DIAGNOSTIC LARYNGOSCOPY: CPT | Performed by: STUDENT IN AN ORGANIZED HEALTH CARE EDUCATION/TRAINING PROGRAM

## 2024-11-15 PROCEDURE — 4004F PT TOBACCO SCREEN RCVD TLK: CPT | Performed by: STUDENT IN AN ORGANIZED HEALTH CARE EDUCATION/TRAINING PROGRAM

## 2024-11-15 PROCEDURE — 99203 OFFICE O/P NEW LOW 30 MIN: CPT | Performed by: STUDENT IN AN ORGANIZED HEALTH CARE EDUCATION/TRAINING PROGRAM

## 2024-11-15 PROCEDURE — 3080F DIAST BP >= 90 MM HG: CPT | Performed by: STUDENT IN AN ORGANIZED HEALTH CARE EDUCATION/TRAINING PROGRAM

## 2024-11-15 ASSESSMENT — ENCOUNTER SYMPTOMS
COUGH: 0
EYE PAIN: 0
NAUSEA: 0
SORE THROAT: 1
RHINORRHEA: 0
VOICE CHANGE: 1
TROUBLE SWALLOWING: 0
SHORTNESS OF BREATH: 0
VOMITING: 0

## 2024-11-15 NOTE — PROGRESS NOTES
[facility]  DIVISION OF OTOLARYNGOLOGY- HEAD & NECK SURGERY  CONSULT    Patient Name: Carson Goldstein  Medical Record Number:  [id number]  Primary Care Physician:  Mary Ann Aparicio DO  Date of Consultation: 11/15/2024    Chief Complaint:   Chief Complaint   Patient presents with    Pharyngitis     Throat pain for a while.      HISTORY OF PRESENT ILLNESS  Carson is a(n) 34 y.o. male who presents for evaluation of throat pain.  This morning approximately 6 months.  He had an EGD performed and states that when majority of the pain did started.  He does have acid reflux and was on Prilosec for the last 6 months.  He was recently started on Protonix and lieu of Prilosec as it was not working and Pepcid at night.  He still has breakthrough reflux.  He does smoke on a daily basis.  He has transition from cigarettes to weed.  Patient Active Problem List   Diagnosis    Essential hypertension    Intractable migraine without aura and without status migrainosus    Smoker    Wrist fracture, closed, left, initial encounter    Open wrist fracture, left, initial encounter    Gastroesophageal reflux disease     Past Surgical History:   Procedure Laterality Date    ARM SURGERY Left 07/05/2024    REMOVAL OF SYMPTOMATIC HARDWARE LEFT WRIST AND FOREARM, EXPLORATION OF EXTENSOR TENDON WITH DEBRIDEMENT AS INDICATED performed by Gurvinder Barnett MD at [facility] OR    COLONOSCOPY N/A 09/20/2024    COLONOSCOPY BIOPSY performed by Quinton Garcia DO at [facility] ENDOSCOPY    COLONOSCOPY N/A 09/20/2024    COLONOSCOPY POLYPECTOMY BIOPSY performed by Quinton Garcia DO at [facility] ENDOSCOPY    FOREARM SURGERY Left 10/22/2023    RADIUS AND ULNA OPEN REDUCTION INTERNAL FIXATION, IRRIGATION AND DEBRIDEMENT OPEN FRACTURE LEFT ARM performed by Johan Payan MD at [facility] OR    FRACTURE SURGERY      NOSE SURGERY      reconstruction    UPPER GASTROINTESTINAL ENDOSCOPY N/A 09/20/2024    ESOPHAGOGASTRODUODENOSCOPY BIOPSY performed by Quinton Garcia  Opzelura Counseling:  I discussed with the patient the risks of Opzelura including but not limited to nasopharngitis, bronchitis, ear infection, eosinophila, hives, diarrhea, folliculitis, tonsillitis, and rhinorrhea.  Taken orally, this medication has been linked to serious infections; higher rate of mortality; malignancy and lymphoproliferative disorders; major adverse cardiovascular events; thrombosis; thrombocytopenia, anemia, and neutropenia; and lipid elevations.

## 2024-11-25 DIAGNOSIS — R10.84 GENERALIZED ABDOMINAL PAIN: Primary | ICD-10-CM

## 2024-12-06 DIAGNOSIS — R07.0 THROAT PAIN: ICD-10-CM

## 2024-12-06 DIAGNOSIS — J40 BRONCHITIS: Primary | ICD-10-CM

## 2024-12-06 NOTE — TELEPHONE ENCOUNTER
LAST REFILL 11/12/2024  AMOUNT 100mL     0 REFILLS  LAST VISIT 11/12/2024  NEXT VISIT No future appointments.

## 2024-12-09 ENCOUNTER — OFFICE VISIT (OUTPATIENT)
Dept: URGENT CARE | Age: 34
End: 2024-12-09

## 2024-12-09 VITALS
DIASTOLIC BLOOD PRESSURE: 88 MMHG | BODY MASS INDEX: 32.2 KG/M2 | TEMPERATURE: 98 F | WEIGHT: 230 LBS | HEIGHT: 71 IN | HEART RATE: 84 BPM | SYSTOLIC BLOOD PRESSURE: 158 MMHG | OXYGEN SATURATION: 98 %

## 2024-12-09 DIAGNOSIS — I10 ELEVATED BLOOD PRESSURE READING IN OFFICE WITH DIAGNOSIS OF HYPERTENSION: ICD-10-CM

## 2024-12-09 DIAGNOSIS — Z20.89 EXPOSURE TO PNEUMONIA: ICD-10-CM

## 2024-12-09 DIAGNOSIS — B96.89 ACUTE BACTERIAL SINUSITIS: Primary | ICD-10-CM

## 2024-12-09 DIAGNOSIS — J01.90 ACUTE BACTERIAL SINUSITIS: Primary | ICD-10-CM

## 2024-12-09 DIAGNOSIS — R05.1 ACUTE COUGH: ICD-10-CM

## 2024-12-09 DIAGNOSIS — J02.9 ACUTE SORE THROAT: ICD-10-CM

## 2024-12-09 DIAGNOSIS — J32.9 PURULENT POSTNASAL DRAINAGE: ICD-10-CM

## 2024-12-09 DIAGNOSIS — L04.9 LYMPHADENITIS, ACUTE: ICD-10-CM

## 2024-12-09 LAB — S PYO AG THROAT QL: NORMAL

## 2024-12-09 RX ORDER — LORATADINE 10 MG/1
10 TABLET ORAL DAILY
Qty: 30 TABLET | Refills: 0 | Status: SHIPPED | OUTPATIENT
Start: 2024-12-09 | End: 2025-01-08

## 2024-12-09 RX ORDER — DIAPER,BRIEF,INFANT-TODD,DISP
EACH MISCELLANEOUS
COMMUNITY
Start: 2024-11-30

## 2024-12-09 RX ORDER — ANTIPRURITIC (ANTI-ITCH) 1 G/100G
1 OINTMENT TOPICAL 3 TIMES DAILY
COMMUNITY
Start: 2024-11-29

## 2024-12-09 RX ORDER — LIDOCAINE HYDROCHLORIDE 20 MG/ML
SOLUTION OROPHARYNGEAL
Qty: 100 ML | Refills: 0 | Status: SHIPPED | OUTPATIENT
Start: 2024-12-09

## 2024-12-09 RX ORDER — DEXTROMETHORPHAN HYDROBROMIDE AND PROMETHAZINE HYDROCHLORIDE 15; 6.25 MG/5ML; MG/5ML
5 SYRUP ORAL 4 TIMES DAILY PRN
Qty: 240 ML | Refills: 0 | Status: SHIPPED | OUTPATIENT
Start: 2024-12-09

## 2024-12-09 RX ORDER — ALBUTEROL SULFATE 90 UG/1
2 INHALANT RESPIRATORY (INHALATION) EVERY 4 HOURS PRN
COMMUNITY
Start: 2024-11-29

## 2024-12-09 ASSESSMENT — ENCOUNTER SYMPTOMS
SORE THROAT: 1
COUGH: 1

## 2024-12-09 NOTE — PATIENT INSTRUCTIONS
Lymphadenitis  Augmentin prescribed to treat infection  Continue Flonase  Suggested OTC remedies for symptom management:  ibuprofen (Advil, Motrin) and acetaminophen (Tylenol) for fevers and pain relief  Saline Mist Sprays such as Arm & Hammer Simply Saline to loosen and clear secretions  Throat sprays (Cepacol, chloraseptic) for throat pain.  throat lozenges, and increased water intake for cough.  honey (1-2 teaspoons every hour) for relief of throat irritation and coughing fits.  warm teas, humidifiers, nasal lavages, and sleeping in an inclined position are also helpful options that can lessen symptoms.  Recommend warm compresses over the symptomatic ear(s) for 10-15 minutes, or a hot shower, followed by 1-2 minutes of massaging the area behind your ears and down the jaw-line to help with the ear congestion/ear pressure.

## 2024-12-09 NOTE — PROGRESS NOTES
patient and/or the family were informed of the results of any tests, a time was given to answer questions, a plan was proposed and they agreed with plan. Reviewed AVS with treatment instructions and answered questions - pt/family expresses understanding and agreement with the discussed treatment plan and AVS instructions.      SUBJECTIVE/OBJECTIVE:  HPI:   34 y.o. male presents for complaint of sore throat \" I think I have an infection in my throat I heard something pop last night on the right side\" x 1 days.    Admits ill contacts, son has pneumonia- Was recently treated for RSV - completed steroids last week. Has cough and throat has hurt constantly for the last month  Denies ever being     Nothing makes symptoms better.  Talking, moving neck, coughing makes symptoms worse.    Has attempted nothing for symptoms     Pt provided HPI by themself - pt considered to be a reliable historian.          VITAL SIGNS  Vitals:    12/09/24 1308 12/09/24 1338 12/09/24 1348   BP: (!) 176/100 (!) 146/86 (!) 158/88   Site: Right Upper Arm     Position: Sitting     Cuff Size: Large Adult     Pulse: 84     Temp: 98 °F (36.7 °C)     TempSrc: Oral     SpO2: 98%     Weight: 104.3 kg (230 lb)     Height: 1.803 m (5' 11\")         Review of Systems   HENT:  Positive for sinus pressure, sore throat and trouble swallowing.    Respiratory:  Positive for cough.      Pertinent positives and negatives as above, or may be included within the HPI.    Physical Exam  Vitals reviewed.   Constitutional:       Appearance: Normal appearance. He is ill-appearing.   HENT:      Head: Normocephalic.      Right Ear: Tympanic membrane normal.      Left Ear: Tympanic membrane normal.      Nose:      Right Sinus: Maxillary sinus tenderness present. No frontal sinus tenderness.      Left Sinus: Maxillary sinus tenderness present. No frontal sinus tenderness.      Mouth/Throat:      Lips: Pink.      Mouth: Mucous membranes are moist.      Pharynx: Uvula midline.

## 2024-12-10 ASSESSMENT — ENCOUNTER SYMPTOMS
TROUBLE SWALLOWING: 1
SINUS PRESSURE: 1

## 2025-01-13 ENCOUNTER — OFFICE VISIT (OUTPATIENT)
Age: 35
End: 2025-01-13

## 2025-01-13 VITALS
RESPIRATION RATE: 18 BRPM | WEIGHT: 230 LBS | TEMPERATURE: 98.4 F | OXYGEN SATURATION: 97 % | SYSTOLIC BLOOD PRESSURE: 128 MMHG | BODY MASS INDEX: 32.08 KG/M2 | HEART RATE: 78 BPM | DIASTOLIC BLOOD PRESSURE: 88 MMHG

## 2025-01-13 DIAGNOSIS — B34.9 VIRAL ILLNESS: ICD-10-CM

## 2025-01-13 DIAGNOSIS — R05.1 ACUTE COUGH: ICD-10-CM

## 2025-01-13 DIAGNOSIS — I10 ELEVATED BLOOD PRESSURE READING IN OFFICE WITH DIAGNOSIS OF HYPERTENSION: ICD-10-CM

## 2025-01-13 DIAGNOSIS — J02.9 SORE THROAT: Primary | ICD-10-CM

## 2025-01-13 LAB
INFLUENZA A ANTIGEN, POC: NEGATIVE
INFLUENZA B ANTIGEN, POC: NEGATIVE
Lab: NORMAL
PERFORMING INSTRUMENT: NORMAL
QC PASS/FAIL: NORMAL
S PYO AG THROAT QL: NORMAL
SARS-COV-2, POC: NORMAL

## 2025-01-13 RX ORDER — BENZONATATE 100 MG/1
100 CAPSULE ORAL 3 TIMES DAILY PRN
Qty: 20 CAPSULE | Refills: 0 | Status: SHIPPED | OUTPATIENT
Start: 2025-01-13

## 2025-01-13 ASSESSMENT — ENCOUNTER SYMPTOMS
SORE THROAT: 1
NAUSEA: 0
SINUS PRESSURE: 1
SHORTNESS OF BREATH: 0
COUGH: 1
TROUBLE SWALLOWING: 0
VOMITING: 0
ABDOMINAL PAIN: 0

## 2025-01-13 NOTE — PROGRESS NOTES
Carson Goldstein (:  1990) is a 34 y.o. male,New patient, here for evaluation of the following chief complaint(s):  Pharyngitis (Pt c/o sore throat, hurts to swallow, neck pain x 1 day)      ASSESSMENT/PLAN:    ICD-10-CM    1. Sore throat  J02.9 POCT rapid strep A      2. Viral illness  B34.9 POCT Influenza A/B Antigen     POCT COVID-19, Antigen      3. Acute cough  R05.1 benzonatate (TESSALON) 100 MG capsule      4. Elevated blood pressure reading in office with diagnosis of hypertension  I10 [facility]        Results for orders placed or performed in visit on 25   POCT COVID-19, Antigen   Result Value Ref Range    SARS-COV-2, POC Not-Detected Not Detected    Lot Number [id number]     QC Pass/Fail pass     Performing Instrument BinaxNOW    POCT Influenza A/B Antigen   Result Value Ref Range    Inflenza A Ag negative     Influenza B Ag negative    POCT rapid strep A   Result Value Ref Range    Strep A Ag None Detected None Detected      Advised he repeat covid test in 24 hours if no improvement ( says he has a home covid test he could take)    Has not taken his BP medication today  Keep hydrated, tylenol or ibuprofen (if no contraindications) as needed if pain or fever..  follow up in  3-5 days if not better..  salt water gargle (before meals/bedtime if able) , cool liquids,   lozenges     Return sooner or go see PCP  if symptoms worse/feeling worse or has new symptoms or concerns       SUBJECTIVE/OBJECTIVE:  Patient presents with:  Pharyngitis: Pt c/o sore throat, hurts to swallow, neck pain x 1 day         History provided by:  Patient   used: No    Pharyngitis  Associated symptoms: congestion, cough, ear pain, myalgias and sore throat    Associated symptoms: no abdominal pain, no chest pain, no nausea, no shortness of breath and no vomiting        Vitals:    25 1152 25 1215   BP: (!) 166/91 128/88   Pulse: 78    Resp: 18    Temp: 98.4 °F

## 2025-01-13 NOTE — PATIENT INSTRUCTIONS
Keep hydrated, tylenol or ibuprofen (if no contraindications) as needed if pain or fever..  follow up in  3-5 days if not better..  salt water gargle (before meals/bedtime if able) , cool liquids,   lozenges     Return sooner or go see PCP  if symptoms worse/feeling worse or has new symptoms or concerns

## 2025-02-28 ENCOUNTER — OFFICE VISIT (OUTPATIENT)
Dept: FAMILY MEDICINE CLINIC | Age: 35
End: 2025-02-28
Payer: COMMERCIAL

## 2025-02-28 VITALS
RESPIRATION RATE: 16 BRPM | BODY MASS INDEX: 31.94 KG/M2 | DIASTOLIC BLOOD PRESSURE: 91 MMHG | OXYGEN SATURATION: 96 % | TEMPERATURE: 97.7 F | WEIGHT: 229 LBS | SYSTOLIC BLOOD PRESSURE: 161 MMHG | HEART RATE: 81 BPM

## 2025-02-28 DIAGNOSIS — J32.9 CHRONIC SINUSITIS, UNSPECIFIED LOCATION: Primary | ICD-10-CM

## 2025-02-28 DIAGNOSIS — K21.9 GASTROESOPHAGEAL REFLUX DISEASE, UNSPECIFIED WHETHER ESOPHAGITIS PRESENT: ICD-10-CM

## 2025-02-28 DIAGNOSIS — I10 ESSENTIAL HYPERTENSION: ICD-10-CM

## 2025-02-28 PROCEDURE — 3080F DIAST BP >= 90 MM HG: CPT | Performed by: SURGERY

## 2025-02-28 PROCEDURE — G2211 COMPLEX E/M VISIT ADD ON: HCPCS | Performed by: SURGERY

## 2025-02-28 PROCEDURE — 1036F TOBACCO NON-USER: CPT | Performed by: SURGERY

## 2025-02-28 PROCEDURE — 3077F SYST BP >= 140 MM HG: CPT | Performed by: SURGERY

## 2025-02-28 PROCEDURE — G8427 DOCREV CUR MEDS BY ELIG CLIN: HCPCS | Performed by: SURGERY

## 2025-02-28 PROCEDURE — 99214 OFFICE O/P EST MOD 30 MIN: CPT | Performed by: SURGERY

## 2025-02-28 PROCEDURE — G8417 CALC BMI ABV UP PARAM F/U: HCPCS | Performed by: SURGERY

## 2025-02-28 RX ORDER — PANTOPRAZOLE SODIUM 20 MG/1
20 TABLET, DELAYED RELEASE ORAL DAILY
Qty: 90 TABLET | Refills: 3 | Status: SHIPPED | OUTPATIENT
Start: 2025-02-28

## 2025-02-28 RX ORDER — AMLODIPINE BESYLATE 5 MG/1
5 TABLET ORAL DAILY
Qty: 90 TABLET | Refills: 0 | Status: SHIPPED | OUTPATIENT
Start: 2025-02-28

## 2025-02-28 SDOH — ECONOMIC STABILITY: FOOD INSECURITY: WITHIN THE PAST 12 MONTHS, YOU WORRIED THAT YOUR FOOD WOULD RUN OUT BEFORE YOU GOT MONEY TO BUY MORE.: NEVER TRUE

## 2025-02-28 SDOH — ECONOMIC STABILITY: FOOD INSECURITY: WITHIN THE PAST 12 MONTHS, THE FOOD YOU BOUGHT JUST DIDN'T LAST AND YOU DIDN'T HAVE MONEY TO GET MORE.: NEVER TRUE

## 2025-02-28 ASSESSMENT — PATIENT HEALTH QUESTIONNAIRE - PHQ9
SUM OF ALL RESPONSES TO PHQ QUESTIONS 1-9: 0
1. LITTLE INTEREST OR PLEASURE IN DOING THINGS: NOT AT ALL
SUM OF ALL RESPONSES TO PHQ QUESTIONS 1-9: 0
2. FEELING DOWN, DEPRESSED OR HOPELESS: NOT AT ALL
SUM OF ALL RESPONSES TO PHQ QUESTIONS 1-9: 0
SUM OF ALL RESPONSES TO PHQ QUESTIONS 1-9: 0

## 2025-02-28 NOTE — PROGRESS NOTES
2/28/2025    History of Present Illness      This is a 34 y.o. male   Chief Complaint   Patient presents with    GI Problem     Ongoing stomach issues     Blood Pressure Check    Hemorrhoids     X 3-4 days - ongoing blood in toilet - hurts off & on   .    Will f/u with GI as previously discussed         Patient Active Problem List   Diagnosis    Essential hypertension    Intractable migraine without aura and without status migrainosus    Smoker    Wrist fracture, closed, left, initial encounter    Open wrist fracture, left, initial encounter    Gastroesophageal reflux disease       Current Outpatient Medications   Medication Sig Dispense Refill    amLODIPine (NORVASC) 5 MG tablet Take 1 tablet by mouth daily 90 tablet 0    pantoprazole (PROTONIX) 20 MG tablet Take 1 tablet by mouth daily 90 tablet 3    hydrocortisone 1 % ointment       albuterol sulfate HFA (PROVENTIL;VENTOLIN;PROAIR) 108 (90 Base) MCG/ACT inhaler Inhale 2 puffs into the lungs every 4 hours as needed      famotidine (PEPCID) 20 MG tablet Take 1 tablet by mouth nightly as needed (heart burn) Do not take with pantoprazole 60 tablet 3    fluticasone (FLONASE) 50 MCG/ACT nasal spray 1 spray by Each Nostril route daily (Patient taking differently: 1 spray by Each Nostril route as needed) 16 g 0    losartan-hydroCHLOROthiazide (HYZAAR) 100-12.5 MG per tablet TAKE 1 TABLET BY MOUTH DAILY 90 tablet 1    SUMAtriptan (IMITREX) 50 MG tablet TAKE 1 TABLET BY MOUTH AT ONSET OF HEADACHE; MAY REPEAT 1 TABLET IN 2 HOURS IF NEEDED. 9 tablet 2    medical marijuana Inhale 1 each into the lungs as needed (migraines).      mupirocin (BACTROBAN) 2 % ointment Dissolve a thin strip of ointment in your irrigation bottle and irrigate. Irrigate twice per day. 30 g 5    doxycycline hyclate (VIBRA-TABS) 100 MG tablet Take 1 tablet by mouth 2 times daily for 10 days 20 tablet 0     No current facility-administered medications for this visit.       Allergies   Allergen Reactions

## 2025-03-24 ENCOUNTER — TELEPHONE (OUTPATIENT)
Dept: FAMILY MEDICINE CLINIC | Age: 35
End: 2025-03-24

## 2025-03-24 NOTE — TELEPHONE ENCOUNTER
Patient called  He has been seeing Dr. Samuel Garcia for GI  Patient asking for a new referral. He is not happy with Dr. Garcia    Seems like he is having blood clot every other BM.    Hoping to get in quick somewhere

## 2025-04-01 ENCOUNTER — OFFICE VISIT (OUTPATIENT)
Dept: ENT CLINIC | Age: 35
End: 2025-04-01
Payer: COMMERCIAL

## 2025-04-01 VITALS
HEART RATE: 62 BPM | WEIGHT: 228 LBS | HEIGHT: 71 IN | SYSTOLIC BLOOD PRESSURE: 154 MMHG | DIASTOLIC BLOOD PRESSURE: 94 MMHG | OXYGEN SATURATION: 98 % | BODY MASS INDEX: 31.92 KG/M2

## 2025-04-01 DIAGNOSIS — J34.89 NASAL CRUSTING: ICD-10-CM

## 2025-04-01 DIAGNOSIS — J31.0 CHRONIC RHINITIS: ICD-10-CM

## 2025-04-01 DIAGNOSIS — J34.2 DEVIATED NASAL SEPTUM: ICD-10-CM

## 2025-04-01 DIAGNOSIS — R04.0 EPISTAXIS: ICD-10-CM

## 2025-04-01 DIAGNOSIS — J32.0 CHRONIC MAXILLARY SINUSITIS: Primary | ICD-10-CM

## 2025-04-01 PROCEDURE — 1036F TOBACCO NON-USER: CPT | Performed by: STUDENT IN AN ORGANIZED HEALTH CARE EDUCATION/TRAINING PROGRAM

## 2025-04-01 PROCEDURE — G8417 CALC BMI ABV UP PARAM F/U: HCPCS | Performed by: STUDENT IN AN ORGANIZED HEALTH CARE EDUCATION/TRAINING PROGRAM

## 2025-04-01 PROCEDURE — 99214 OFFICE O/P EST MOD 30 MIN: CPT | Performed by: STUDENT IN AN ORGANIZED HEALTH CARE EDUCATION/TRAINING PROGRAM

## 2025-04-01 PROCEDURE — 3080F DIAST BP >= 90 MM HG: CPT | Performed by: STUDENT IN AN ORGANIZED HEALTH CARE EDUCATION/TRAINING PROGRAM

## 2025-04-01 PROCEDURE — 3077F SYST BP >= 140 MM HG: CPT | Performed by: STUDENT IN AN ORGANIZED HEALTH CARE EDUCATION/TRAINING PROGRAM

## 2025-04-01 PROCEDURE — G8427 DOCREV CUR MEDS BY ELIG CLIN: HCPCS | Performed by: STUDENT IN AN ORGANIZED HEALTH CARE EDUCATION/TRAINING PROGRAM

## 2025-04-01 PROCEDURE — 31231 NASAL ENDOSCOPY DX: CPT | Performed by: STUDENT IN AN ORGANIZED HEALTH CARE EDUCATION/TRAINING PROGRAM

## 2025-04-01 RX ORDER — MUPIROCIN 20 MG/G
OINTMENT TOPICAL
Qty: 30 G | Refills: 5 | Status: SHIPPED | OUTPATIENT
Start: 2025-04-01

## 2025-04-01 RX ORDER — DOXYCYCLINE HYCLATE 100 MG
100 TABLET ORAL 2 TIMES DAILY
Qty: 20 TABLET | Refills: 0 | Status: SHIPPED | OUTPATIENT
Start: 2025-04-01 | End: 2025-04-11

## 2025-04-01 NOTE — PROGRESS NOTES
[facility]  DIVISION OF OTOLARYNGOLOGY- HEAD & NECK SURGERY  CONSULT      Carson Goldstein (:  1990) is a 34 y.o. male, here for evaluation of the following chief complaint(s):  Sinus Problem      ASSESSMENT/PLAN:  1. Chronic maxillary sinusitis  -     CT SINUS FOR IMAGE GUIDANCE; Future  2. Deviated nasal septum  3. Nasal crusting  4. Chronic rhinitis  5. Epistaxis         This is a very pleasant 34 y.o. male here today for evaluation of the the above-noted complaints.      Assessment & Plan  1. Postnasal drip/chronic rhinitis/suspected chronic sinusitis/nasal crusting  - Persistent postnasal drip with associated throat burning and pain for approximately 2 years  - Examination revealed areas of redness without active bleeding  - CT scan of the sinuses to evaluate for underlying chronic sinusitis and infection  - Start saline irrigations with Bactroban ointment for bleeding, crusting, and nasal discharge  - Prescribe a 10-day course of antibiotics.  Risk, benefits alternatives discussed with the patient.    2. Deviated septum  - Likely resulting from previous reconstructive surgery  - Contributes to obstruction of right nasal passageway  - Consider surgical intervention if medical treatment does not alleviate symptoms    3. Suspected sleep apnea  - Reports symptoms suggestive of sleep apnea, including episodes of stopped breathing during sleep and the need to catch breath  - Further evaluation for sleep apnea recommended  -Will discuss sleep study at follow-up visit    Follow-up  - Patient will follow up in a few weeks    PROCEDURE  Nasal endoscopy was performed today.    Medical Decision Making:  The following items were considered in medical decision making:  Independent review of images  Review / order clinical lab tests  Review / order radiology tests  Decision to obtain old records  Review and summation of old records as accessed through [facility] if

## 2025-04-17 ENCOUNTER — OFFICE VISIT (OUTPATIENT)
Dept: FAMILY MEDICINE CLINIC | Age: 35
End: 2025-04-17
Payer: COMMERCIAL

## 2025-04-17 VITALS
OXYGEN SATURATION: 95 % | SYSTOLIC BLOOD PRESSURE: 161 MMHG | HEART RATE: 76 BPM | WEIGHT: 230.8 LBS | BODY MASS INDEX: 32.19 KG/M2 | RESPIRATION RATE: 16 BRPM | DIASTOLIC BLOOD PRESSURE: 84 MMHG | TEMPERATURE: 98.9 F

## 2025-04-17 DIAGNOSIS — K44.9 HIATAL HERNIA: ICD-10-CM

## 2025-04-17 DIAGNOSIS — R10.13 EPIGASTRIC PAIN: Primary | ICD-10-CM

## 2025-04-17 DIAGNOSIS — K21.9 GASTROESOPHAGEAL REFLUX DISEASE, UNSPECIFIED WHETHER ESOPHAGITIS PRESENT: ICD-10-CM

## 2025-04-17 PROCEDURE — 3077F SYST BP >= 140 MM HG: CPT | Performed by: SURGERY

## 2025-04-17 PROCEDURE — 3079F DIAST BP 80-89 MM HG: CPT | Performed by: SURGERY

## 2025-04-17 PROCEDURE — G8427 DOCREV CUR MEDS BY ELIG CLIN: HCPCS | Performed by: SURGERY

## 2025-04-17 PROCEDURE — G8417 CALC BMI ABV UP PARAM F/U: HCPCS | Performed by: SURGERY

## 2025-04-17 PROCEDURE — 99213 OFFICE O/P EST LOW 20 MIN: CPT | Performed by: SURGERY

## 2025-04-17 PROCEDURE — 4004F PT TOBACCO SCREEN RCVD TLK: CPT | Performed by: SURGERY

## 2025-04-17 RX ORDER — SUCRALFATE ORAL 1 G/10ML
1 SUSPENSION ORAL 4 TIMES DAILY
Qty: 1200 ML | Refills: 3 | Status: SHIPPED | OUTPATIENT
Start: 2025-04-17

## 2025-04-17 RX ORDER — DICYCLOMINE HCL 20 MG
TABLET ORAL
COMMUNITY
Start: 2025-03-04

## 2025-04-17 ASSESSMENT — PATIENT HEALTH QUESTIONNAIRE - PHQ9
SUM OF ALL RESPONSES TO PHQ QUESTIONS 1-9: 0
2. FEELING DOWN, DEPRESSED OR HOPELESS: NOT AT ALL
SUM OF ALL RESPONSES TO PHQ QUESTIONS 1-9: 0
1. LITTLE INTEREST OR PLEASURE IN DOING THINGS: NOT AT ALL
SUM OF ALL RESPONSES TO PHQ QUESTIONS 1-9: 0
SUM OF ALL RESPONSES TO PHQ QUESTIONS 1-9: 0

## 2025-04-17 NOTE — PROGRESS NOTES
abdomen: Partially obstructive hiatal hernia measuring 4 cm   - Ultrasound of the abdomen: 11/2024, Normal common bile duct of 5 mm and an unremarkable gallbladder with no fluids or stones    Diagnostic Testing   - EGD: 4 cm hiatal hernia, normal esophagus, stomach, and duodenum. Biopsies were normal        Mary Ann Aparicio DO

## 2025-04-28 ENCOUNTER — TELEPHONE (OUTPATIENT)
Dept: BARIATRICS/WEIGHT MGMT | Age: 35
End: 2025-04-28

## 2025-04-29 NOTE — TELEPHONE ENCOUNTER
Pt had EGD on 9/20/24.    Stated hiatal hernia, would you like any recent imaging    Please advise  
Thank you  
IV discontinued, cath removed intact

## 2025-04-30 ENCOUNTER — OFFICE VISIT (OUTPATIENT)
Dept: BARIATRICS/WEIGHT MGMT | Age: 35
End: 2025-04-30

## 2025-04-30 VITALS
SYSTOLIC BLOOD PRESSURE: 168 MMHG | DIASTOLIC BLOOD PRESSURE: 96 MMHG | HEART RATE: 59 BPM | WEIGHT: 229 LBS | HEIGHT: 71 IN | BODY MASS INDEX: 32.06 KG/M2

## 2025-04-30 DIAGNOSIS — R10.13 ABDOMINAL PAIN, EPIGASTRIC: ICD-10-CM

## 2025-04-30 DIAGNOSIS — K92.0 HEMATEMESIS WITH NAUSEA: ICD-10-CM

## 2025-04-30 DIAGNOSIS — K44.9 HIATAL HERNIA WITH GERD: Primary | ICD-10-CM

## 2025-04-30 DIAGNOSIS — K21.9 HIATAL HERNIA WITH GERD: Primary | ICD-10-CM

## 2025-04-30 NOTE — PROGRESS NOTES
[facility]   General & Laparoscopic Surgery  [employer]       HPI:  Carson Goldstein is a very pleasant 34 y.o. male  who is referred for consultation by Mary Ann Robert with regards severe reflux disease.       It started several years ago, patient is currently on Protonix, and Pepcid daily for reflux, and Carafate daily and has previously been switched from multiple other PPI's. Patient does smokes marijuana 1 to 2 times daily, does not use tobacco anymore and is making strides to quit marijuana use. Patient does watch diet carefully, The heartburn is associated with occasional dysphagia. Does have some bloating, post nasal drip, coughing up blood due to ulcerations, and abdominal pain.   Patient denies vomiting, fevers, chills, chest pain, shortness of breath or any weight loss.    Not currently on blood thinners, or chronic steroids, no past abdominal operations.     Past Medical History:   Diagnosis Date    Acid reflux     Hematemesis 03/23/2024    Hypertension     Migraines      Past Surgical History:   Procedure Laterality Date    ARM SURGERY Left 07/05/2024    REMOVAL OF SYMPTOMATIC HARDWARE LEFT WRIST AND FOREARM, EXPLORATION OF EXTENSOR TENDON WITH DEBRIDEMENT AS INDICATED performed by Gurvinder Barnett MD at [facility] OR    COLONOSCOPY N/A 09/20/2024    COLONOSCOPY BIOPSY performed by Quinton Garcia DO at [facility] ENDOSCOPY    COLONOSCOPY N/A 09/20/2024    COLONOSCOPY POLYPECTOMY BIOPSY performed by Quinton Garcia DO at [facility] ENDOSCOPY    FOREARM SURGERY Left 10/22/2023    RADIUS AND ULNA OPEN REDUCTION INTERNAL FIXATION, IRRIGATION AND DEBRIDEMENT OPEN FRACTURE LEFT ARM performed by Johan Payan MD at [facility] OR    FRACTURE SURGERY      NOSE SURGERY      reconstruction    UPPER GASTROINTESTINAL ENDOSCOPY N/A 09/20/2024    ESOPHAGOGASTRODUODENOSCOPY BIOPSY performed by Quinton Garcia DO at [facility] ENDOSCOPY     Family History   Problem Relation Age of

## 2025-05-01 ENCOUNTER — PREP FOR PROCEDURE (OUTPATIENT)
Dept: BARIATRICS/WEIGHT MGMT | Age: 35
End: 2025-05-01

## 2025-05-01 ENCOUNTER — TELEPHONE (OUTPATIENT)
Dept: BARIATRICS/WEIGHT MGMT | Age: 35
End: 2025-05-01

## 2025-05-01 DIAGNOSIS — R04.2 HEMOPTYSIS: Primary | ICD-10-CM

## 2025-05-01 PROBLEM — K43.9 VENTRAL HERNIA: Status: ACTIVE | Noted: 2025-05-01

## 2025-05-01 LAB
DEPRECATED RDW RBC AUTO: 13.9 % (ref 12.4–15.4)
HCT VFR BLD AUTO: 47.5 % (ref 40.5–52.5)
HGB BLD-MCNC: 16.1 G/DL (ref 13.5–17.5)
MCH RBC QN AUTO: 29.4 PG (ref 26–34)
MCHC RBC AUTO-ENTMCNC: 33.9 G/DL (ref 31–36)
MCV RBC AUTO: 86.7 FL (ref 80–100)
PLATELET # BLD AUTO: 434 K/UL (ref 135–450)
PMV BLD AUTO: 8.4 FL (ref 5–10.5)
RBC # BLD AUTO: 5.48 M/UL (ref 4.2–5.9)
WBC # BLD AUTO: 10.7 K/UL (ref 4–11)

## 2025-05-01 RX ORDER — SODIUM CHLORIDE 9 MG/ML
INJECTION, SOLUTION INTRAVENOUS CONTINUOUS
Status: CANCELLED | OUTPATIENT
Start: 2025-05-01

## 2025-05-01 RX ORDER — SODIUM CHLORIDE 0.9 % (FLUSH) 0.9 %
5-40 SYRINGE (ML) INJECTION EVERY 12 HOURS SCHEDULED
Status: CANCELLED | OUTPATIENT
Start: 2025-05-01

## 2025-05-01 RX ORDER — HEPARIN SODIUM 5000 [USP'U]/ML
5000 INJECTION, SOLUTION INTRAVENOUS; SUBCUTANEOUS ONCE
Status: CANCELLED | OUTPATIENT
Start: 2025-05-01 | End: 2025-05-01

## 2025-05-01 RX ORDER — SODIUM CHLORIDE 9 MG/ML
INJECTION, SOLUTION INTRAVENOUS PRN
Status: CANCELLED | OUTPATIENT
Start: 2025-05-01

## 2025-05-01 RX ORDER — SODIUM CHLORIDE 0.9 % (FLUSH) 0.9 %
5-40 SYRINGE (ML) INJECTION PRN
Status: CANCELLED | OUTPATIENT
Start: 2025-05-01

## 2025-05-01 NOTE — TELEPHONE ENCOUNTER
Referral to [facility] Hernández for hemoptysis per provider request. Notified pt with a voicemail and Dr. Riojas's phone number.

## 2025-05-02 ENCOUNTER — TELEPHONE (OUTPATIENT)
Age: 35
End: 2025-05-02

## 2025-05-02 NOTE — TELEPHONE ENCOUNTER
Attempted to call patient, no answer  Lvm to call back       Will try to schedule patient for 5/7 @874

## 2025-05-02 NOTE — TELEPHONE ENCOUNTER
Dr. Cannon called Dr. Riojas and stated the patient is coughing up blood.     When can we get him into the office ?

## 2025-05-02 NOTE — TELEPHONE ENCOUNTER
Dr. Dowell just agreed to cover the ICU for me on 5/7 in the morning (and possibly later) because my wife is having surgery.  Does he have other times?

## 2025-05-05 ENCOUNTER — TELEPHONE (OUTPATIENT)
Dept: BARIATRICS/WEIGHT MGMT | Age: 35
End: 2025-05-05

## 2025-05-05 NOTE — TELEPHONE ENCOUNTER
Reference #: [id number]     Description: Outpatient Elective     Place Of Service: [address] [facility]     Submitting Provider: Ady Cannon     Requesting/Ordering Provider: Robson Leonard/Osteopath () ETHAN     Servicing/Rendering Provider: [facility]     Facility: [facility]     Member Information   Member Name: Carson Mooney Id: [id number]     YOB: 1990     Gender: Male     Service Event   Diagnosis Code: K44.9 Diaphragmatic hernia without obstruction or gangrene; K21.9 Gastro-esophageal reflux disease without esophagitis; K43.9 Ventral hernia without obstruction or gangrene   Procedure: 68483 Laparoscopy, surgical, repair of paraesophageal hernia, includes fundoplasty, when performed; with implantation of mesh   Line #1   Requested Received Date: 5/5/2025 1:04:01 PM Requested Units: 1   Start Date of Service: 6/17/2025 Authorized Units: 0   End Date of Service: 6/18/2025 Status: Pending      Service Event   Diagnosis Code: K44.9 Diaphragmatic hernia without obstruction or gangrene; K21.9 Gastro-esophageal reflux disease without esophagitis; K43.9 Ventral hernia without obstruction or gangrene   Procedure: 99484 Repair of anterior abdominal hernia(s) (ie, epigastric, incisional, ventral, umbilical, spigelian), any approach (ie, open, laparoscopic, robotic), initial, including implanta   Line #1   Requested Received Date: 5/5/2025 1:04:01 PM Requested Units: 1   Start Date of Service: 6/17/2025 Authorized Units: 0   End Date of Service: 6/18/2025 Status: Pending

## 2025-05-06 ENCOUNTER — PREP FOR PROCEDURE (OUTPATIENT)
Dept: ENT CLINIC | Age: 35
End: 2025-05-06

## 2025-05-06 ENCOUNTER — HOSPITAL ENCOUNTER (OUTPATIENT)
Dept: CT IMAGING | Age: 35
Discharge: HOME | End: 2025-05-06
Attending: STUDENT IN AN ORGANIZED HEALTH CARE EDUCATION/TRAINING PROGRAM
Payer: COMMERCIAL

## 2025-05-06 ENCOUNTER — OFFICE VISIT (OUTPATIENT)
Dept: ENT CLINIC | Age: 35
End: 2025-05-06
Payer: COMMERCIAL

## 2025-05-06 VITALS
SYSTOLIC BLOOD PRESSURE: 148 MMHG | WEIGHT: 233 LBS | HEIGHT: 71 IN | HEART RATE: 66 BPM | BODY MASS INDEX: 32.62 KG/M2 | OXYGEN SATURATION: 98 % | DIASTOLIC BLOOD PRESSURE: 95 MMHG

## 2025-05-06 DIAGNOSIS — J34.3 HYPERTROPHY OF BOTH INFERIOR NASAL TURBINATES: ICD-10-CM

## 2025-05-06 DIAGNOSIS — J35.02 CHRONIC ADENOIDITIS: ICD-10-CM

## 2025-05-06 DIAGNOSIS — J31.0 CHRONIC RHINITIS: ICD-10-CM

## 2025-05-06 DIAGNOSIS — J34.89 CONCHA BULLOSA: ICD-10-CM

## 2025-05-06 DIAGNOSIS — J32.0 CHRONIC MAXILLARY SINUSITIS: Primary | ICD-10-CM

## 2025-05-06 DIAGNOSIS — J34.89 REFRACTORY OBSTRUCTION OF NASAL AIRWAY: ICD-10-CM

## 2025-05-06 DIAGNOSIS — J34.89 NASAL CRUSTING: ICD-10-CM

## 2025-05-06 DIAGNOSIS — J35.2 ADENOID HYPERTROPHY: ICD-10-CM

## 2025-05-06 DIAGNOSIS — J32.0 CHRONIC MAXILLARY SINUSITIS: ICD-10-CM

## 2025-05-06 DIAGNOSIS — J35.03 CHRONIC ADENOTONSILLITIS: ICD-10-CM

## 2025-05-06 DIAGNOSIS — J34.89 NASAL OBSTRUCTION: ICD-10-CM

## 2025-05-06 PROCEDURE — 4004F PT TOBACCO SCREEN RCVD TLK: CPT | Performed by: STUDENT IN AN ORGANIZED HEALTH CARE EDUCATION/TRAINING PROGRAM

## 2025-05-06 PROCEDURE — 3077F SYST BP >= 140 MM HG: CPT | Performed by: STUDENT IN AN ORGANIZED HEALTH CARE EDUCATION/TRAINING PROGRAM

## 2025-05-06 PROCEDURE — 3080F DIAST BP >= 90 MM HG: CPT | Performed by: STUDENT IN AN ORGANIZED HEALTH CARE EDUCATION/TRAINING PROGRAM

## 2025-05-06 PROCEDURE — G8417 CALC BMI ABV UP PARAM F/U: HCPCS | Performed by: STUDENT IN AN ORGANIZED HEALTH CARE EDUCATION/TRAINING PROGRAM

## 2025-05-06 PROCEDURE — 99214 OFFICE O/P EST MOD 30 MIN: CPT | Performed by: STUDENT IN AN ORGANIZED HEALTH CARE EDUCATION/TRAINING PROGRAM

## 2025-05-06 PROCEDURE — G8427 DOCREV CUR MEDS BY ELIG CLIN: HCPCS | Performed by: STUDENT IN AN ORGANIZED HEALTH CARE EDUCATION/TRAINING PROGRAM

## 2025-05-06 PROCEDURE — 70486 CT MAXILLOFACIAL W/O DYE: CPT

## 2025-05-06 RX ORDER — SODIUM CHLORIDE 0.9 % (FLUSH) 0.9 %
5-40 SYRINGE (ML) INJECTION EVERY 12 HOURS SCHEDULED
Status: CANCELLED | OUTPATIENT
Start: 2025-05-06

## 2025-05-06 RX ORDER — SODIUM CHLORIDE 9 MG/ML
INJECTION, SOLUTION INTRAVENOUS PRN
Status: CANCELLED | OUTPATIENT
Start: 2025-05-06

## 2025-05-06 RX ORDER — SODIUM CHLORIDE 0.9 % (FLUSH) 0.9 %
5-40 SYRINGE (ML) INJECTION PRN
Status: CANCELLED | OUTPATIENT
Start: 2025-05-06

## 2025-05-06 NOTE — PROGRESS NOTES
[facility]  DIVISION OF OTOLARYNGOLOGY- HEAD & NECK SURGERY  CONSULT      Carson Goldstein (:  1990) is a 34 y.o. male, here for evaluation of the following chief complaint(s):  Results (CT)      ASSESSMENT/PLAN:  1. Chronic maxillary sinusitis  2. Nasal crusting  3. Chronic rhinitis  4. Hypertrophy of both inferior nasal turbinates  5. Alvin bullosa  6. Nasal obstruction  7. Chronic adenoiditis  8. Adenoid hypertrophy         This is a very pleasant 34 y.o. male here today for evaluation of the the above-noted complaints.      Assessment & Plan  We obtained a posttreatment CT scan of his sinuses which shows evidence of inferior turbinate hypertrophy, obstructing alvin bullosa, chronic right maxillary sinusitis with inspissated mucus and polyp.  On nasal endoscopy, he has persistent mucoid drainage from the right maxillary ostium, nasal obstruction due to inferior turbinate hypertrophy predominantly on the left, obstructing alvin bullosa as well as chronically inflamed adenoid tissue.    The patient has tried appropriate medical management including greater than 12 weeks of intranasal corticosteroid sprays, multiple rounds of oral antibiotics, antibiotic saline irrigations and is still symptomatic..  We discussed treatment options in detail.  We discussed that he would be a candidate for turbinate reduction, right maxillary antrostomy with tissue removal, alvin bullosa removal, adenoidectomy and cryoablation of the posterior nasal nerves.  We discussed expected outcomes.    -Risks and benefits of septoplasty/turbinate reduction including pain, inflammation, infection, hemorrhage, cosmesis (including nasal valve collapse or saddle nose deformity), worsened nasal airway obstruction, hyposmia/anosmia, numbness to the teeth or gums, injury to the septum including septal perforation, need for further surgery  -Risks of adenoidectomy including hemorrhage and VPI  -Risks of sinus surgery include

## 2025-05-07 NOTE — TELEPHONE ENCOUNTER
Left message for patient to call our office or the [facility] office number that was highlighted on his referral sheet given in the office.

## 2025-05-07 NOTE — TELEPHONE ENCOUNTER
I have attempted to reach patient 3x via phone to schedule appt and sent a [employer] message with no response

## 2025-05-13 ENCOUNTER — TELEPHONE (OUTPATIENT)
Dept: ENT CLINIC | Age: 35
End: 2025-05-13

## 2025-05-13 ENCOUNTER — HOSPITAL ENCOUNTER (OUTPATIENT)
Age: 35
Discharge: HOME | End: 2025-05-13
Attending: SURGERY
Payer: COMMERCIAL

## 2025-05-13 DIAGNOSIS — R10.13 ABDOMINAL PAIN, EPIGASTRIC: ICD-10-CM

## 2025-05-13 DIAGNOSIS — K21.9 HIATAL HERNIA WITH GERD: ICD-10-CM

## 2025-05-13 DIAGNOSIS — K92.0 HEMATEMESIS WITH NAUSEA: ICD-10-CM

## 2025-05-13 DIAGNOSIS — K44.9 HIATAL HERNIA WITH GERD: ICD-10-CM

## 2025-05-13 PROCEDURE — 74240 X-RAY XM UPR GI TRC 1CNTRST: CPT

## 2025-05-13 PROCEDURE — 6370000000 HC RX 637 (ALT 250 FOR IP): Performed by: RADIOLOGY

## 2025-05-13 PROCEDURE — 2500000003 HC RX 250 WO HCPCS: Performed by: RADIOLOGY

## 2025-05-13 RX ADMIN — BARIUM SULFATE 140 ML: 980 POWDER, FOR SUSPENSION ORAL at 08:10

## 2025-05-13 RX ADMIN — ANTACID/ANTIFLATULENT 1 EACH: 380; 550; 10; 10 GRANULE, EFFERVESCENT ORAL at 08:10

## 2025-05-13 RX ADMIN — BARIUM SULFATE 140 ML: 960 POWDER, FOR SUSPENSION ORAL at 08:10

## 2025-05-13 NOTE — TELEPHONE ENCOUNTER
CPT code 68543 adenoidectomy was added to surgery the code was on the surgery sheet not written just confirming this procedure.

## 2025-05-13 NOTE — TELEPHONE ENCOUNTER
Left message for pt that we had received the UGI results that showed no hiatal hernia, GERD or esophagitis present. Pt advised provider is happy to see him for follow up regarding his symptoms in the office and to schedule at his convenience. Pt notified provider doesn't think he'd benefit from the surgery scheduled, and it has now been cancelled.

## 2025-05-15 RX ORDER — OMEPRAZOLE 40 MG/1
40 CAPSULE, DELAYED RELEASE ORAL 2 TIMES DAILY
COMMUNITY
Start: 2025-05-01

## 2025-05-15 NOTE — PROGRESS NOTES
[facility] PRE-OPERATIVE INSTRUCTIONS    Day of Procedure:    5/30/25            Arrival time:   0905             Surgery time: 1105    Take the following medications with a sip of water:  Follow your MD/Surgeons pre-procedure instructions regarding your medications     Do not eat or drink anything after 12:00 midnight prior to your surgery.  This includes water, chewing gum, mints and ice chips.   You may brush your teeth and gargle the morning of your surgery, but do not swallow the water.     Please see your family doctor/pediatrician for a history and physical and/or concerning medications.   Bring any test results/reports from your physicians office.   If you are under the care of a heart doctor or specialist doctor, please be aware that you may be asked to see them for clearance.    You may be asked to stop blood thinners such as Coumadin, Plavix, Fragmin, Lovenox, etc., or any anti-inflammatories such as:  Aspirin, Ibuprofen, Advil, Naproxen prior to your surgery.    We also ask that you stop any over the counter medications such as fish oil, vitamin E, glucosamine, garlic, Multivitamins, COQ 10, etc.    We ask that you do not smoke 24 hours prior to surgery.  We ask that you do not  drink any alcoholic beverages 24 hours prior to surgery     You must make arrangements for a responsible adult to take you home after your surgery.    For your safety, you will not be allowed to leave alone or drive yourself home.  Your surgery will be cancelled if you do not have a ride home.     Also for your safety, you must have someone stay with you the first 24 hours after your surgery.     A parent or legal guardian must accompany a child scheduled for surgery and plan to stay at the hospital until the child is discharged.    Please do not bring other children with you.    For your comfort, please wear simple loose fitting clothing to the hospital.  Please do not bring valuables.    Do not wear any make-up on

## 2025-05-27 ENCOUNTER — TELEPHONE (OUTPATIENT)
Dept: ENT CLINIC | Age: 35
End: 2025-05-27

## 2025-05-27 NOTE — TELEPHONE ENCOUNTER
Called and left a message to call the office need to tell him that there was a time change for his surgery he needs to be there at 6:30 and surgery will be at 8:30

## 2025-05-29 ENCOUNTER — ANESTHESIA EVENT (OUTPATIENT)
Dept: OPERATING ROOM | Age: 35
End: 2025-05-29
Payer: COMMERCIAL

## 2025-05-29 NOTE — DISCHARGE INSTRUCTIONS
SINUS SURGERY / SEPTOPLASTY / TURBINATE REDUCTION     Post Operative Instructions   [facility] [place] [facility] number-[phone]    The Surgery Itself   Sinus surgery and/or Septoplasty and turbinate reduction involves general anesthesia. Patients may be sedated for several hours after surgery and may remain sleepy for the better part of the day. Nausea and vomiting is occasionally seen, and usually resolves by the evening of surgery - even without additional medications. Almost all patients can go home the day of surgery.     After Surgery  You may have plastic splints in your nose following surgery; this will make breathing through your nose difficult. A humidifier or vaporizer can be used in the bedroom to prevent throat pain with mouth breathing.   Bloody nasal drainage is normal after this surgery for 5-7 days, usually decreasing in volume with each day that passes. Drainage will flow from the front of the nose and down the back of the throat. Make sure you spit out blood drainage that drips down the back of your throat to prevent nausea/vomiting. You will have a nasal drip pad/sling with gauze to catch drainage from the front of your nose. The dressing may need to be changed frequently during the first 24 hours following surgery. In case of profuse nasal bleeding, you may apply ice to the bridge of the nose and pinch the nose just above the tip and hold for 10 minutes; if profuse bleeding continues, contact the doctor's office.   You may notice facial pressure and fullness similar to a mild sinus infection. This is more common if splints are in place.   Frequent hot showers, breathing in steam from a pot of boiling water, or simply sniffing a small amount of water through your nose will help break up congestion and clear any clot or mucus that builds up within the nose after surgery. Do not pull at the splints, gauze or sutures in your nose after surgery.    It is more comfortable to sleep with extra pillows or in a  recliner for the first few days after surgery until the drainage begins to resolve.   Do not blow your nose for 2 weeks after surgery.   Avoid lifting > 10 lbs. and no vigorous exercise for 2 weeks after surgery.   Sense of smell and taste are often diminished for several weeks after surgery. There may be some tenderness or numbness in your upper front teeth, which is normal after surgery. You may express old clot, discolored mucus or very large nasal crusts from your nose for up to 3-4 weeks after surgery; depending on how frequently and how effectively you irrigate your nose with the saltwater spray.     Medications     You should take 200mg of ibuprofen combined with 500mg of acetaminophen every 6 hours for pain. This will control most of the pain. Only use the narcotic pain medication for breakthrough pain. You can take this with or without the tylenol and ibuprofen.   Pain medication should be used for pain as prescribed. Pain and pressure in the nose is expected after surgery - the nasal splints cause most of this. As the surgical site heals, pain will resolve over the course of a week. Pain medications can cause nausea, which can be prevented if you take them with food or milk.   Take your antibiotics and any other medications as prescribed  Do not use nasal steroid sprays until instructed to do so at a follow up appointment.  You can purchase this nasal spray for use after surgery:   Afrin can be used up to 2 times a day (best before bed) to reduce bloody drainage from the nose for the first few days after surgery. DO NOT USE FOR MORE THAN 3 DAYS  Take all of your routine medications as prescribed, unless told otherwise by your doctor.       SINUS IRRIGATIONS INSTRUCTIONS    Started the day after sinus surgery use sinus irrigations at least 3 times per day (full bottle).   Fill the bottle with distilled water and mixed the salt bicarbonate packet.  An instructional video can be seen at:

## 2025-05-30 ENCOUNTER — HOSPITAL ENCOUNTER (OUTPATIENT)
Age: 35
Setting detail: OUTPATIENT SURGERY
Discharge: HOME | End: 2025-05-30
Attending: STUDENT IN AN ORGANIZED HEALTH CARE EDUCATION/TRAINING PROGRAM | Admitting: STUDENT IN AN ORGANIZED HEALTH CARE EDUCATION/TRAINING PROGRAM
Payer: COMMERCIAL

## 2025-05-30 ENCOUNTER — ANESTHESIA (OUTPATIENT)
Dept: OPERATING ROOM | Age: 35
End: 2025-05-30
Payer: COMMERCIAL

## 2025-05-30 VITALS
WEIGHT: 230 LBS | TEMPERATURE: 97 F | OXYGEN SATURATION: 96 % | HEIGHT: 71 IN | RESPIRATION RATE: 16 BRPM | DIASTOLIC BLOOD PRESSURE: 95 MMHG | SYSTOLIC BLOOD PRESSURE: 150 MMHG | BODY MASS INDEX: 32.2 KG/M2 | HEART RATE: 81 BPM

## 2025-05-30 DIAGNOSIS — J32.0 CHRONIC MAXILLARY SINUSITIS: ICD-10-CM

## 2025-05-30 DIAGNOSIS — J34.3 HYPERTROPHY OF BOTH INFERIOR NASAL TURBINATES: ICD-10-CM

## 2025-05-30 DIAGNOSIS — J35.2 ADENOID HYPERTROPHY: ICD-10-CM

## 2025-05-30 DIAGNOSIS — G89.18 POST-OP PAIN: Primary | ICD-10-CM

## 2025-05-30 DIAGNOSIS — J31.0 CHRONIC RHINITIS: ICD-10-CM

## 2025-05-30 DIAGNOSIS — J35.03 CHRONIC ADENOTONSILLITIS: ICD-10-CM

## 2025-05-30 DIAGNOSIS — J34.89 REFRACTORY OBSTRUCTION OF NASAL AIRWAY: ICD-10-CM

## 2025-05-30 DIAGNOSIS — J34.89 CONCHA BULLOSA: ICD-10-CM

## 2025-05-30 PROBLEM — J34.2 NASAL SEPTAL DEVIATION: Status: ACTIVE | Noted: 2025-05-30

## 2025-05-30 PROCEDURE — 2580000003 HC RX 258: Performed by: ANESTHESIOLOGY

## 2025-05-30 PROCEDURE — 31240 NSL/SNS NDSC CNCH BULL RESCJ: CPT | Performed by: STUDENT IN AN ORGANIZED HEALTH CARE EDUCATION/TRAINING PROGRAM

## 2025-05-30 PROCEDURE — 3700000001 HC ADD 15 MINUTES (ANESTHESIA): Performed by: STUDENT IN AN ORGANIZED HEALTH CARE EDUCATION/TRAINING PROGRAM

## 2025-05-30 PROCEDURE — 3600000012 HC SURGERY LEVEL 2 ADDTL 15MIN: Performed by: STUDENT IN AN ORGANIZED HEALTH CARE EDUCATION/TRAINING PROGRAM

## 2025-05-30 PROCEDURE — 30520 REPAIR OF NASAL SEPTUM: CPT | Performed by: STUDENT IN AN ORGANIZED HEALTH CARE EDUCATION/TRAINING PROGRAM

## 2025-05-30 PROCEDURE — 88304 TISSUE EXAM BY PATHOLOGIST: CPT

## 2025-05-30 PROCEDURE — 6360000002 HC RX W HCPCS: Performed by: STUDENT IN AN ORGANIZED HEALTH CARE EDUCATION/TRAINING PROGRAM

## 2025-05-30 PROCEDURE — 30140 RESECT INFERIOR TURBINATE: CPT | Performed by: STUDENT IN AN ORGANIZED HEALTH CARE EDUCATION/TRAINING PROGRAM

## 2025-05-30 PROCEDURE — 3700000000 HC ANESTHESIA ATTENDED CARE: Performed by: STUDENT IN AN ORGANIZED HEALTH CARE EDUCATION/TRAINING PROGRAM

## 2025-05-30 PROCEDURE — 7100000010 HC PHASE II RECOVERY - FIRST 15 MIN: Performed by: STUDENT IN AN ORGANIZED HEALTH CARE EDUCATION/TRAINING PROGRAM

## 2025-05-30 PROCEDURE — 42831 REMOVAL OF ADENOIDS: CPT | Performed by: STUDENT IN AN ORGANIZED HEALTH CARE EDUCATION/TRAINING PROGRAM

## 2025-05-30 PROCEDURE — 2720000010 HC SURG SUPPLY STERILE: Performed by: STUDENT IN AN ORGANIZED HEALTH CARE EDUCATION/TRAINING PROGRAM

## 2025-05-30 PROCEDURE — 2709999900 HC NON-CHARGEABLE SUPPLY: Performed by: STUDENT IN AN ORGANIZED HEALTH CARE EDUCATION/TRAINING PROGRAM

## 2025-05-30 PROCEDURE — 31267 ENDOSCOPY MAXILLARY SINUS: CPT | Performed by: STUDENT IN AN ORGANIZED HEALTH CARE EDUCATION/TRAINING PROGRAM

## 2025-05-30 PROCEDURE — 7100000001 HC PACU RECOVERY - ADDTL 15 MIN: Performed by: STUDENT IN AN ORGANIZED HEALTH CARE EDUCATION/TRAINING PROGRAM

## 2025-05-30 PROCEDURE — 6370000000 HC RX 637 (ALT 250 FOR IP): Performed by: STUDENT IN AN ORGANIZED HEALTH CARE EDUCATION/TRAINING PROGRAM

## 2025-05-30 PROCEDURE — 3600000002 HC SURGERY LEVEL 2 BASE: Performed by: STUDENT IN AN ORGANIZED HEALTH CARE EDUCATION/TRAINING PROGRAM

## 2025-05-30 PROCEDURE — 7100000000 HC PACU RECOVERY - FIRST 15 MIN: Performed by: STUDENT IN AN ORGANIZED HEALTH CARE EDUCATION/TRAINING PROGRAM

## 2025-05-30 PROCEDURE — 61782 SCAN PROC CRANIAL EXTRA: CPT | Performed by: STUDENT IN AN ORGANIZED HEALTH CARE EDUCATION/TRAINING PROGRAM

## 2025-05-30 PROCEDURE — 2500000003 HC RX 250 WO HCPCS

## 2025-05-30 PROCEDURE — C2618 PROBE/NEEDLE, CRYO: HCPCS | Performed by: STUDENT IN AN ORGANIZED HEALTH CARE EDUCATION/TRAINING PROGRAM

## 2025-05-30 PROCEDURE — 31243 NSL/SINUS NDSC CRYOABLTJ PNN: CPT | Performed by: STUDENT IN AN ORGANIZED HEALTH CARE EDUCATION/TRAINING PROGRAM

## 2025-05-30 PROCEDURE — 88305 TISSUE EXAM BY PATHOLOGIST: CPT

## 2025-05-30 PROCEDURE — 6370000000 HC RX 637 (ALT 250 FOR IP): Performed by: ANESTHESIOLOGY

## 2025-05-30 PROCEDURE — 6360000002 HC RX W HCPCS

## 2025-05-30 PROCEDURE — 7100000011 HC PHASE II RECOVERY - ADDTL 15 MIN: Performed by: STUDENT IN AN ORGANIZED HEALTH CARE EDUCATION/TRAINING PROGRAM

## 2025-05-30 PROCEDURE — 2500000003 HC RX 250 WO HCPCS: Performed by: STUDENT IN AN ORGANIZED HEALTH CARE EDUCATION/TRAINING PROGRAM

## 2025-05-30 PROCEDURE — 6360000002 HC RX W HCPCS: Performed by: ANESTHESIOLOGY

## 2025-05-30 RX ORDER — MUPIROCIN 20 MG/G
OINTMENT TOPICAL
Status: DISCONTINUED | OUTPATIENT
Start: 2025-05-30 | End: 2025-05-30 | Stop reason: ALTCHOICE

## 2025-05-30 RX ORDER — FENTANYL CITRATE 50 UG/ML
INJECTION, SOLUTION INTRAMUSCULAR; INTRAVENOUS
Status: DISCONTINUED | OUTPATIENT
Start: 2025-05-30 | End: 2025-05-30 | Stop reason: SDUPTHER

## 2025-05-30 RX ORDER — SODIUM CHLORIDE 9 MG/ML
INJECTION, SOLUTION INTRAVENOUS PRN
Status: DISCONTINUED | OUTPATIENT
Start: 2025-05-30 | End: 2025-05-30 | Stop reason: HOSPADM

## 2025-05-30 RX ORDER — MIDAZOLAM HYDROCHLORIDE 1 MG/ML
INJECTION, SOLUTION INTRAMUSCULAR; INTRAVENOUS
Status: DISCONTINUED | OUTPATIENT
Start: 2025-05-30 | End: 2025-05-30 | Stop reason: SDUPTHER

## 2025-05-30 RX ORDER — PROPOFOL 10 MG/ML
INJECTION, EMULSION INTRAVENOUS
Status: DISCONTINUED | OUTPATIENT
Start: 2025-05-30 | End: 2025-05-30 | Stop reason: SDUPTHER

## 2025-05-30 RX ORDER — FENTANYL CITRATE 50 UG/ML
50 INJECTION, SOLUTION INTRAMUSCULAR; INTRAVENOUS EVERY 5 MIN PRN
Status: COMPLETED | OUTPATIENT
Start: 2025-05-30 | End: 2025-05-30

## 2025-05-30 RX ORDER — ONDANSETRON 2 MG/ML
INJECTION INTRAMUSCULAR; INTRAVENOUS
Status: DISCONTINUED | OUTPATIENT
Start: 2025-05-30 | End: 2025-05-30 | Stop reason: SDUPTHER

## 2025-05-30 RX ORDER — LIDOCAINE HYDROCHLORIDE 20 MG/ML
INJECTION, SOLUTION EPIDURAL; INFILTRATION; INTRACAUDAL; PERINEURAL
Status: DISCONTINUED | OUTPATIENT
Start: 2025-05-30 | End: 2025-05-30 | Stop reason: SDUPTHER

## 2025-05-30 RX ORDER — DOXYCYCLINE HYCLATE 100 MG
100 TABLET ORAL 2 TIMES DAILY
Qty: 14 TABLET | Refills: 0 | Status: SHIPPED | OUTPATIENT
Start: 2025-05-30 | End: 2025-06-06

## 2025-05-30 RX ORDER — HYDROCODONE BITARTRATE AND ACETAMINOPHEN 5; 325 MG/1; MG/1
1 TABLET ORAL EVERY 4 HOURS PRN
Qty: 18 TABLET | Refills: 0 | Status: SHIPPED | OUTPATIENT
Start: 2025-05-30 | End: 2025-06-02

## 2025-05-30 RX ORDER — DEXAMETHASONE SODIUM PHOSPHATE 4 MG/ML
INJECTION, SOLUTION INTRA-ARTICULAR; INTRALESIONAL; INTRAMUSCULAR; INTRAVENOUS; SOFT TISSUE
Status: DISCONTINUED | OUTPATIENT
Start: 2025-05-30 | End: 2025-05-30 | Stop reason: SDUPTHER

## 2025-05-30 RX ORDER — ONDANSETRON 2 MG/ML
4 INJECTION INTRAMUSCULAR; INTRAVENOUS
Status: COMPLETED | OUTPATIENT
Start: 2025-05-30 | End: 2025-05-30

## 2025-05-30 RX ORDER — SODIUM CHLORIDE 0.9 % (FLUSH) 0.9 %
5-40 SYRINGE (ML) INJECTION EVERY 12 HOURS SCHEDULED
Status: DISCONTINUED | OUTPATIENT
Start: 2025-05-30 | End: 2025-05-30 | Stop reason: HOSPADM

## 2025-05-30 RX ORDER — SODIUM CHLORIDE 0.9 % (FLUSH) 0.9 %
5-40 SYRINGE (ML) INJECTION PRN
Status: DISCONTINUED | OUTPATIENT
Start: 2025-05-30 | End: 2025-05-30 | Stop reason: SDUPTHER

## 2025-05-30 RX ORDER — GLYCOPYRROLATE 0.2 MG/ML
INJECTION INTRAMUSCULAR; INTRAVENOUS
Status: DISCONTINUED | OUTPATIENT
Start: 2025-05-30 | End: 2025-05-30 | Stop reason: SDUPTHER

## 2025-05-30 RX ORDER — NALOXONE HYDROCHLORIDE 0.4 MG/ML
INJECTION, SOLUTION INTRAMUSCULAR; INTRAVENOUS; SUBCUTANEOUS PRN
Status: DISCONTINUED | OUTPATIENT
Start: 2025-05-30 | End: 2025-05-30 | Stop reason: HOSPADM

## 2025-05-30 RX ORDER — OXYMETAZOLINE HYDROCHLORIDE 0.05 G/100ML
SPRAY NASAL
Qty: 1 EACH | Refills: 3 | Status: SHIPPED | OUTPATIENT
Start: 2025-05-30

## 2025-05-30 RX ORDER — MEPERIDINE HYDROCHLORIDE 25 MG/ML
12.5 INJECTION INTRAMUSCULAR; INTRAVENOUS; SUBCUTANEOUS EVERY 5 MIN PRN
Status: DISCONTINUED | OUTPATIENT
Start: 2025-05-30 | End: 2025-05-30 | Stop reason: HOSPADM

## 2025-05-30 RX ORDER — OXYCODONE HYDROCHLORIDE 10 MG/1
10 TABLET ORAL PRN
Status: COMPLETED | OUTPATIENT
Start: 2025-05-30 | End: 2025-05-30

## 2025-05-30 RX ORDER — FENTANYL CITRATE 50 UG/ML
25 INJECTION, SOLUTION INTRAMUSCULAR; INTRAVENOUS EVERY 5 MIN PRN
Status: DISCONTINUED | OUTPATIENT
Start: 2025-05-30 | End: 2025-05-30 | Stop reason: HOSPADM

## 2025-05-30 RX ORDER — SODIUM CHLORIDE 9 MG/ML
INJECTION, SOLUTION INTRAVENOUS PRN
Status: DISCONTINUED | OUTPATIENT
Start: 2025-05-30 | End: 2025-05-30 | Stop reason: SDUPTHER

## 2025-05-30 RX ORDER — PHENYLEPHRINE HCL IN 0.9% NACL 1 MG/10 ML
SYRINGE (ML) INTRAVENOUS
Status: DISCONTINUED | OUTPATIENT
Start: 2025-05-30 | End: 2025-05-30 | Stop reason: SDUPTHER

## 2025-05-30 RX ORDER — ROCURONIUM BROMIDE 10 MG/ML
INJECTION, SOLUTION INTRAVENOUS
Status: DISCONTINUED | OUTPATIENT
Start: 2025-05-30 | End: 2025-05-30 | Stop reason: SDUPTHER

## 2025-05-30 RX ORDER — EPHEDRINE SULFATE/0.9% NACL/PF 25 MG/5 ML
SYRINGE (ML) INTRAVENOUS
Status: DISCONTINUED | OUTPATIENT
Start: 2025-05-30 | End: 2025-05-30 | Stop reason: SDUPTHER

## 2025-05-30 RX ORDER — LIDOCAINE HYDROCHLORIDE AND EPINEPHRINE 10; 10 MG/ML; UG/ML
INJECTION, SOLUTION INFILTRATION; PERINEURAL
Status: DISCONTINUED | OUTPATIENT
Start: 2025-05-30 | End: 2025-05-30 | Stop reason: ALTCHOICE

## 2025-05-30 RX ORDER — MAGNESIUM HYDROXIDE 1200 MG/15ML
LIQUID ORAL CONTINUOUS PRN
Status: COMPLETED | OUTPATIENT
Start: 2025-05-30 | End: 2025-05-30

## 2025-05-30 RX ORDER — SODIUM CHLORIDE 0.9 % (FLUSH) 0.9 %
5-40 SYRINGE (ML) INJECTION PRN
Status: DISCONTINUED | OUTPATIENT
Start: 2025-05-30 | End: 2025-05-30 | Stop reason: HOSPADM

## 2025-05-30 RX ORDER — SODIUM CHLORIDE 0.9 % (FLUSH) 0.9 %
5-40 SYRINGE (ML) INJECTION EVERY 12 HOURS SCHEDULED
Status: DISCONTINUED | OUTPATIENT
Start: 2025-05-30 | End: 2025-05-30 | Stop reason: SDUPTHER

## 2025-05-30 RX ORDER — OXYCODONE HYDROCHLORIDE 5 MG/1
5 TABLET ORAL PRN
Status: COMPLETED | OUTPATIENT
Start: 2025-05-30 | End: 2025-05-30

## 2025-05-30 RX ADMIN — FENTANYL CITRATE 50 MCG: 50 INJECTION, SOLUTION INTRAMUSCULAR; INTRAVENOUS at 09:24

## 2025-05-30 RX ADMIN — FENTANYL CITRATE 50 MCG: 50 INJECTION INTRAMUSCULAR; INTRAVENOUS at 11:20

## 2025-05-30 RX ADMIN — FENTANYL CITRATE 50 MCG: 50 INJECTION INTRAMUSCULAR; INTRAVENOUS at 11:27

## 2025-05-30 RX ADMIN — GLYCOPYRROLATE 0.2 MG: 0.2 INJECTION INTRAMUSCULAR; INTRAVENOUS at 09:25

## 2025-05-30 RX ADMIN — ONDANSETRON 4 MG: 2 INJECTION, SOLUTION INTRAMUSCULAR; INTRAVENOUS at 11:24

## 2025-05-30 RX ADMIN — ONDANSETRON 4 MG: 2 INJECTION INTRAMUSCULAR; INTRAVENOUS at 09:44

## 2025-05-30 RX ADMIN — Medication 100 MCG: at 09:16

## 2025-05-30 RX ADMIN — SODIUM CHLORIDE: 9 INJECTION, SOLUTION INTRAVENOUS at 09:00

## 2025-05-30 RX ADMIN — Medication 100 MCG: at 09:28

## 2025-05-30 RX ADMIN — OXYCODONE HYDROCHLORIDE 10 MG: 10 TABLET ORAL at 12:24

## 2025-05-30 RX ADMIN — LIDOCAINE HYDROCHLORIDE 100 MG: 20 INJECTION, SOLUTION EPIDURAL; INFILTRATION; INTRACAUDAL; PERINEURAL at 09:06

## 2025-05-30 RX ADMIN — SODIUM CHLORIDE: 9 INJECTION, SOLUTION INTRAVENOUS at 10:37

## 2025-05-30 RX ADMIN — Medication 0.25 MG: at 11:01

## 2025-05-30 RX ADMIN — Medication 100 MCG: at 09:18

## 2025-05-30 RX ADMIN — MIDAZOLAM HYDROCHLORIDE 2 MG: 1 INJECTION, SOLUTION INTRAMUSCULAR; INTRAVENOUS at 09:00

## 2025-05-30 RX ADMIN — DEXAMETHASONE SODIUM PHOSPHATE 4 MG: 4 INJECTION, SOLUTION INTRA-ARTICULAR; INTRALESIONAL; INTRAMUSCULAR; INTRAVENOUS; SOFT TISSUE at 10:28

## 2025-05-30 RX ADMIN — PROPOFOL 300 MG: 10 INJECTION, EMULSION INTRAVENOUS at 09:06

## 2025-05-30 RX ADMIN — Medication 0.25 MG: at 11:08

## 2025-05-30 RX ADMIN — Medication 100 MCG: at 09:31

## 2025-05-30 RX ADMIN — Medication 5 MG: at 09:34

## 2025-05-30 RX ADMIN — Medication 0.5 MG: at 10:54

## 2025-05-30 RX ADMIN — ROCURONIUM BROMIDE 50 MG: 10 INJECTION, SOLUTION INTRAVENOUS at 09:06

## 2025-05-30 RX ADMIN — FENTANYL CITRATE 50 MCG: 50 INJECTION, SOLUTION INTRAMUSCULAR; INTRAVENOUS at 09:06

## 2025-05-30 RX ADMIN — FENTANYL CITRATE 50 MCG: 50 INJECTION INTRAMUSCULAR; INTRAVENOUS at 11:51

## 2025-05-30 RX ADMIN — ROCURONIUM BROMIDE 20 MG: 10 INJECTION, SOLUTION INTRAVENOUS at 09:43

## 2025-05-30 RX ADMIN — DEXAMETHASONE SODIUM PHOSPHATE 8 MG: 4 INJECTION, SOLUTION INTRA-ARTICULAR; INTRALESIONAL; INTRAMUSCULAR; INTRAVENOUS; SOFT TISSUE at 09:12

## 2025-05-30 RX ADMIN — FENTANYL CITRATE 50 MCG: 50 INJECTION INTRAMUSCULAR; INTRAVENOUS at 11:41

## 2025-05-30 ASSESSMENT — PAIN DESCRIPTION - ORIENTATION
ORIENTATION: INNER

## 2025-05-30 ASSESSMENT — PAIN DESCRIPTION - FREQUENCY
FREQUENCY: CONTINUOUS

## 2025-05-30 ASSESSMENT — PAIN SCALES - GENERAL
PAINLEVEL_OUTOF10: 0
PAINLEVEL_OUTOF10: 5
PAINLEVEL_OUTOF10: 7
PAINLEVEL_OUTOF10: 5
PAINLEVEL_OUTOF10: 7
PAINLEVEL_OUTOF10: 5
PAINLEVEL_OUTOF10: 7
PAINLEVEL_OUTOF10: 5

## 2025-05-30 ASSESSMENT — PAIN DESCRIPTION - LOCATION
LOCATION: NOSE

## 2025-05-30 ASSESSMENT — PAIN DESCRIPTION - PAIN TYPE
TYPE: SURGICAL PAIN

## 2025-05-30 ASSESSMENT — PAIN - FUNCTIONAL ASSESSMENT
PAIN_FUNCTIONAL_ASSESSMENT: PREVENTS OR INTERFERES SOME ACTIVE ACTIVITIES AND ADLS
PAIN_FUNCTIONAL_ASSESSMENT: NONE - DENIES PAIN
PAIN_FUNCTIONAL_ASSESSMENT: ACTIVITIES ARE NOT PREVENTED
PAIN_FUNCTIONAL_ASSESSMENT: PREVENTS OR INTERFERES SOME ACTIVE ACTIVITIES AND ADLS
PAIN_FUNCTIONAL_ASSESSMENT: PREVENTS OR INTERFERES SOME ACTIVE ACTIVITIES AND ADLS

## 2025-05-30 ASSESSMENT — PAIN DESCRIPTION - ONSET
ONSET: ON-GOING

## 2025-05-30 ASSESSMENT — PAIN DESCRIPTION - DESCRIPTORS
DESCRIPTORS: BURNING

## 2025-05-30 ASSESSMENT — ENCOUNTER SYMPTOMS: SHORTNESS OF BREATH: 0

## 2025-05-30 NOTE — PROGRESS NOTES
1113 pt arrived from OR, alert, vitals stable on room air. Mustache dressing present- serosanguinous drainage noted. Lewis splints reported.

## 2025-05-30 NOTE — ANESTHESIA PRE PROCEDURE
[facility] Department of Anesthesiology  Pre-Anesthesia Evaluation/Consultation       Name:  Carson Goldstein  : 1990  Age:  35 y.o.                                           MRN:  [id number]  Date: 2025           Surgeon: Surgeon(s):  Gurvinder Kinney MD    Procedure: Procedure(s):  Right maxillary antrostomy with tissue removal, removal of alvin bullosa, turbinate reduction cryoablation of posterior nasal nerves, image guidance.  BILATERAL ADENOIDECTOMY     Allergies   Allergen Reactions   • Coconut (Cocos Nucifera) Anaphylaxis and Rash   • Orange Oil Nausea And Vomiting     Patient Active Problem List   Diagnosis   • Essential hypertension   • Intractable migraine without aura and without status migrainosus   • Smoker   • Wrist fracture, closed, left, initial encounter   • Open wrist fracture, left, initial encounter   • Gastroesophageal reflux disease   • Ventral hernia   • Refractory obstruction of nasal airway   • Hypertrophy of both inferior nasal turbinates   • Chronic maxillary sinusitis   • Alvin bullosa   • Chronic rhinitis   • Chronic adenotonsillitis   • Adenoid hypertrophy     Past Medical History:   Diagnosis Date   • Acid reflux    • Hematemesis 2024   • Hypertension    • Migraines    • Wears glasses      Past Surgical History:   Procedure Laterality Date   • ARM SURGERY Left 2024    REMOVAL OF SYMPTOMATIC HARDWARE LEFT WRIST AND FOREARM, EXPLORATION OF EXTENSOR TENDON WITH DEBRIDEMENT AS INDICATED performed by Gurvinder Barnett MD at [facility] OR   • COLONOSCOPY N/A 2024    COLONOSCOPY BIOPSY performed by Quinton Garcia DO at [facility] ENDOSCOPY   • COLONOSCOPY N/A 2024    COLONOSCOPY POLYPECTOMY BIOPSY performed by Quinton Garcia DO at [facility] ENDOSCOPY   • FOREARM SURGERY Left 10/22/2023    RADIUS AND ULNA OPEN REDUCTION INTERNAL FIXATION, IRRIGATION AND DEBRIDEMENT OPEN FRACTURE LEFT ARM performed by Johan Payan MD at [facility] OR   • NOSE SURGERY

## 2025-05-30 NOTE — PROGRESS NOTES
Pain nausea treated with PRNs as ordered. Nausea improved. Mustache dressing changed, moderate serosanguinous drainage. Face ice pack applied. Ice chips given, nausea remains minimal.

## 2025-05-30 NOTE — H&P
[facility]  DIVISION OF OTOLARYNGOLOGY- HEAD & NECK SURGERY  CONSULT      Carson Goldstein (:  1990) is a 35 y.o. male, here for evaluation of the following chief complaint(s):  No chief complaint on file.      ASSESSMENT/PLAN:  1. Chronic maxillary sinusitis  2. Nasal crusting  3. Chronic rhinitis  4. Hypertrophy of both inferior nasal turbinates  5. Alvin bullosa  6. Nasal obstruction  7. Chronic adenoiditis  8. Adenoid hypertrophy         This is a very pleasant 35 y.o. male here today for evaluation of the the above-noted complaints.      Assessment & Plan  We obtained a posttreatment CT scan of his sinuses which shows evidence of inferior turbinate hypertrophy, obstructing alvin bullosa, chronic right maxillary sinusitis with inspissated mucus and polyp.  On nasal endoscopy, he has persistent mucoid drainage from the right maxillary ostium, nasal obstruction due to inferior turbinate hypertrophy predominantly on the left, obstructing alvin bullosa as well as chronically inflamed adenoid tissue.    The patient has tried appropriate medical management including greater than 12 weeks of intranasal corticosteroid sprays, multiple rounds of oral antibiotics, antibiotic saline irrigations and is still symptomatic..  We discussed treatment options in detail.  We discussed that he would be a candidate for turbinate reduction, right maxillary antrostomy with tissue removal, alvin bullosa removal, adenoidectomy and cryoablation of the posterior nasal nerves.  We discussed expected outcomes. We discussed possible septoplasty.     -Risks and benefits of septoplasty/turbinate reduction including pain, inflammation, infection, hemorrhage, cosmesis (including nasal valve collapse or saddle nose deformity), worsened nasal airway obstruction, hyposmia/anosmia, numbness to the teeth or gums, injury to the septum including septal perforation, need for further surgery  -Risks of adenoidectomy including hemorrhage and

## 2025-05-30 NOTE — OP NOTE
[facility]  DIVISION OF OTOLARYNGOLOGY- HEAD & NECK SURGERY  OPERATIVE REPORT    Patient Name: Carson Goldstein  YOB: 1990  Medical Record Number:  [id number]  Billing Number:  [id number]  Time: 0840    Pre Operative Diagnoses:   1. Chronic right maxillary sinusitis  2. Septal deviation  3. Inferior turbinate hypertrophy  4.  Nasal obstruction  5.  Alvin bullosa  6.  Chronic rhinitis  7.  Adenoid hypertrophy    Post Operative Diagnoses:    Same        Procedure:    1.  Right maxillary enterostomy with tissue removal  2.  Endoscopic septoplasty  3.  Resection of right sided obstructing alvin bullosa  4.  Cryoablation of posterior nasal nerves  5. Submucous resection of inferior turbinates   6. Stereotactic extradural image guidance   7.  Adenoidectomy    Surgeon: Gurvinder Kinney MD    OR Staff/ Assistant:  Circulator: Mady Acevedo RN; Marguerite Salinas RN  Relief Scrub: Jessica Álvarez  Scrub Person First: Octavia Haas    Anesthesia:  General anesthesia.     Findings:    Significant rightward septal deviation causing nasal obstruction.  In order to access the nasal passageway on the right side, a septoplasty was necessary.  Large right maxillary sinus polyp with a large amount of inspissated mucus and Louie cell.  No injury to the skull base or orbit      Indications:  This is a 35 y.o. male with with the above-noted medical issues.  Risks and benefits were discussed with the patient including alternate treatment options. Informed consent was obtained and the patient elected to proceed with the planned procedure.    DETAILS OF PROCEDURE(S):   The patient was brought to the operating room and placed supine on the operating room table.  After general anesthesia was obtained, epinephrine  soaked pledgets were placed into both nasal cavities.  The Fusion headset was placed and registered for use.  Prior to the procedure, the pre-op planning station was used to plan our approach.  Instruments

## 2025-05-30 NOTE — ANESTHESIA POSTPROCEDURE EVALUATION
Department of Anesthesiology  Postprocedure Note    Patient: Carson Goldstein  MRN: [id number]  YOB: 1990  Date of evaluation: 5/30/2025    Procedure Summary       Date: 05/30/25 Room / Location: [address]    Anesthesia Start: 0900 Anesthesia Stop: 1111    Procedures:       Right maxillary antrostomy with tissue removal, removal of alvin bullosa, turbinate reduction cryoablation of posterior nasal nerves, image guidance. (Bilateral)      BILATERAL ADENOIDECTOMY, SEPTOPLASTY (Bilateral) Diagnosis:       Refractory obstruction of nasal airway      Hypertrophy of both inferior nasal turbinates      Chronic maxillary sinusitis      Alvin bullosa      Chronic rhinitis      Chronic adenotonsillitis      Adenoid hypertrophy      (Refractory obstruction of nasal airway [J34.89])      (Hypertrophy of both inferior nasal turbinates [J34.3])      (Chronic maxillary sinusitis [J32.0])      (Alvin bullosa [J34.89])      (Chronic rhinitis [J31.0])      (Chronic adenotonsillitis [J35.03])      (Adenoid hypertrophy [J35.2])    Surgeons: Gurvinder Kinney MD Responsible Provider: Naga Soliman MD    Anesthesia Type: General ASA Status: 2            Anesthesia Type: General    Neeru Phase I: Neeru Score: 10    Neeru Phase II: Neeru Score: 10    Anesthesia Post Evaluation    Patient location during evaluation: PACU  Patient participation: complete - patient participated  Level of consciousness: awake and alert  Airway patency: patent  Nausea & Vomiting: no nausea and no vomiting  Cardiovascular status: hemodynamically stable  Respiratory status: acceptable  Hydration status: stable  Pain management: adequate    No notable events documented.

## 2025-06-02 DIAGNOSIS — J32.9 CHRONIC SINUSITIS, UNSPECIFIED LOCATION: Primary | ICD-10-CM

## 2025-06-02 RX ORDER — HYDROCODONE BITARTRATE AND ACETAMINOPHEN 5; 325 MG/1; MG/1
1 TABLET ORAL EVERY 6 HOURS PRN
Qty: 10 TABLET | Refills: 0 | Status: SHIPPED | OUTPATIENT
Start: 2025-06-02 | End: 2025-06-05

## 2025-06-05 ENCOUNTER — OFFICE VISIT (OUTPATIENT)
Dept: ENT CLINIC | Age: 35
End: 2025-06-05
Payer: COMMERCIAL

## 2025-06-05 VITALS
SYSTOLIC BLOOD PRESSURE: 147 MMHG | HEIGHT: 71 IN | BODY MASS INDEX: 32.09 KG/M2 | HEART RATE: 82 BPM | OXYGEN SATURATION: 98 % | DIASTOLIC BLOOD PRESSURE: 99 MMHG

## 2025-06-05 DIAGNOSIS — J34.89 NASAL CRUSTING: ICD-10-CM

## 2025-06-05 DIAGNOSIS — J32.0 CHRONIC MAXILLARY SINUSITIS: Primary | ICD-10-CM

## 2025-06-05 DIAGNOSIS — J31.0 CHRONIC RHINITIS: ICD-10-CM

## 2025-06-05 PROCEDURE — 31231 NASAL ENDOSCOPY DX: CPT | Performed by: STUDENT IN AN ORGANIZED HEALTH CARE EDUCATION/TRAINING PROGRAM

## 2025-06-05 NOTE — PROGRESS NOTES
[facility]  DIVISION OF OTOLARYNGOLOGY- HEAD & NECK SURGERY        Carson Goldstein (:  1990) is a 35 y.o. male, here for evaluation of the following chief complaint(s):  Post-Op Check (Sinus surgery 25)      ASSESSMENT/PLAN:  1. Chronic maxillary sinusitis  2. Nasal crusting  3. Chronic rhinitis           This is a very pleasant 35 y.o. male here today for evaluation of the the above-noted complaints.      Assessment & Plan  The patient is status post adenoidectomy, right sided alvin bullosa resection, cryoablation of posterior nasal nerves hright maxillary antrostomy, inferior turbinate reduction and septoplasty on 2025.  Adenoid tissue consistent with benign inflammatory process    -Finish perioperative medications  - Sinus precautions for 1 more week  -Saline irrigations for nasal crusting  - Follow-up in 1 month      Medical Decision Making:  The following items were considered in medical decision making:  Independent review of images  Review / order clinical lab tests  Review / order radiology tests  Decision to obtain old records  Review and summation of old records as accessed through [employer] if applicable    SUBJECTIVE/OBJECTIVE:  HPI    History of Present Illness  The patient is a 34-year-old male who presents for postnasal drip.    Postnasal Drip  - Severe postnasal drip since discontinuing drug use  - Persistent throat discomfort characterized by a burning sensation and pain  - Sensation of obstruction in right nostril, while left nostril feels excessively open  - Occasional loss of smell and taste  - Numbness in the roof of his mouth  - Consulted an ENT specialist at [facility] approximately 4 to 6 months ago, who attributed symptoms to gastrointestinal issues  - Used Flonase for a couple of months, initially promoting healing but subsequently leading to bleeding and expulsion of white skin from his nose  - Experienced bleeding from his nose yesterday following a sneeze,

## 2025-06-16 ENCOUNTER — TELEPHONE (OUTPATIENT)
Dept: ENT CLINIC | Age: 35
End: 2025-06-16

## 2025-06-16 ENCOUNTER — OFFICE VISIT (OUTPATIENT)
Dept: URGENT CARE | Age: 35
End: 2025-06-16

## 2025-06-16 VITALS
TEMPERATURE: 98.5 F | SYSTOLIC BLOOD PRESSURE: 132 MMHG | HEART RATE: 94 BPM | DIASTOLIC BLOOD PRESSURE: 94 MMHG | OXYGEN SATURATION: 97 % | BODY MASS INDEX: 31.81 KG/M2 | WEIGHT: 228 LBS

## 2025-06-16 DIAGNOSIS — J03.90 TONSILLITIS: ICD-10-CM

## 2025-06-16 DIAGNOSIS — J00 NASOPHARYNGITIS: Primary | ICD-10-CM

## 2025-06-16 DIAGNOSIS — J02.9 SORE THROAT: ICD-10-CM

## 2025-06-16 DIAGNOSIS — I10 UNCONTROLLED HYPERTENSION: ICD-10-CM

## 2025-06-16 DIAGNOSIS — R09.81 NASAL CONGESTION: ICD-10-CM

## 2025-06-16 LAB — STREPTOCOCCUS A RNA: NORMAL

## 2025-06-16 RX ORDER — DEXTROMETHORPHAN HBR AND PYRILAMINE MALEATE 30; 30 MG/1; MG/1
1 TABLET ORAL 4 TIMES DAILY PRN
Qty: 40 TABLET | Refills: 0 | Status: SHIPPED | OUTPATIENT
Start: 2025-06-16

## 2025-06-16 RX ORDER — ESOMEPRAZOLE MAGNESIUM 40 MG/1
40 CAPSULE, DELAYED RELEASE ORAL
COMMUNITY
Start: 2025-05-19

## 2025-06-16 RX ORDER — GUAIFENESIN 600 MG/1
600 TABLET, EXTENDED RELEASE ORAL 2 TIMES DAILY
Qty: 20 TABLET | Refills: 0 | Status: SHIPPED | OUTPATIENT
Start: 2025-06-16 | End: 2025-06-26

## 2025-06-16 RX ORDER — AMOXICILLIN 500 MG/1
500 CAPSULE ORAL 2 TIMES DAILY
Qty: 14 CAPSULE | Refills: 0 | Status: SHIPPED | OUTPATIENT
Start: 2025-06-16 | End: 2025-06-23

## 2025-06-16 ASSESSMENT — ENCOUNTER SYMPTOMS
SINUS PRESSURE: 1
DIARRHEA: 0
ABDOMINAL PAIN: 0
WHEEZING: 0
VOMITING: 0
SINUS PAIN: 0
RHINORRHEA: 0
CHEST TIGHTNESS: 0
SORE THROAT: 1
COUGH: 0
VOICE CHANGE: 0
SHORTNESS OF BREATH: 0
NAUSEA: 0

## 2025-06-16 ASSESSMENT — VISUAL ACUITY: OU: 1

## 2025-06-16 NOTE — TELEPHONE ENCOUNTER
Spoke to the pt and told him that Dr. Kinney sent him in medication and that he should try to hydrate a little more, try some cold and flu medication over the counter and rotate tylenol and ibuprofen

## 2025-06-16 NOTE — TELEPHONE ENCOUNTER
Patient had surgery on June 30th, since Friday his throat is swollen,red and painful . Please advise

## 2025-06-16 NOTE — PATIENT INSTRUCTIONS
Carson,    Thank you for trusting [facility] with your care. Your decision to come to us means a lot and we are honored to be part of your healthcare journey. We value your trust and hope your experience with us was positive and met your expectations.    We're always looking for ways to improve, and your feedback is incredibly important to us. You will receive a text or email soon asking you how your visit went. for If you could take a moment to share your thoughts, it would mean the world to us. Your input helps us better serve you and others in the community.     Thank you again for choosing us. We're grateful for the opportunity to care for you and your loved ones. We hope to see you again - though we always wish you health and wellness!    Warm regards,    [facility]    Lai Kramer PA-C, Janessa (Registration), and Megha (Medical Assistant)         Results for orders placed or performed in visit on 06/16/25   POCT Rapid Strep A DNA (Alere i)   Result Value Ref Range    Streptococcus A RNA neg        [place] DMT prescribed for cough and congestion relief.  If the pharmacy does not have this medication in stock, you can use over-the-counter Coricidin for similar relief.  Guaifenesin (Mucinex) prescribed for expectorant therapy.    Recommend OTC treatment for symptoms:  acetaminophen (Tylenol) for fevers and pain relief.  antihistamines (Claritin, Zyrtec, Allegra, or Xyzal) and nasal steroid sprays (such as Flonase) to help with nasal congestion and runny nose.  guaifenesin (Mucinex) can help with thinning out mucus which can help with chest congestion or with relieving persistent sinus pressure  throat sprays (Cepacol, chloraseptic) for throat pain.  dextromethorphan (Robitussin, Delsym), throat lozenges, and increased water intake for cough.  honey (1-2 teaspoons every hour) for relief of throat irritation and coughing fits.  warm teas, humidifiers, nasal lavages, and    Problem: Patient Centered Care  Goal: Patient preferences are identified and integrated in the patient's plan of care  Description: Interventions:  - What would you like us to know as we care for you? I am from home and speak [place].   - Provide timely, complete, and accurate information to patient/family  - Incorporate patient and family knowledge, values, beliefs, and cultural backgrounds into the planning and delivery of care  - Encourage patient/family to participate in care and decision-making at the level they choose  - Honor patient and family perspectives and choices  Outcome: Progressing   Patient alert and oriented x 4. Tylenol IV given for complaints of left hip pain. Diet advanced to Full liquid for dinner after CT abd/ pelvis resulted. Patient advised to eat small amount at a time, Dorys denies abdominal pain, no c/o nausea. Patient reports 2 small BMs, TPN  and IV Abx has been infusing as ordered. Patient has been frequently ambulating in the halls, tolerating activity well, fall precautions in place.

## 2025-06-16 NOTE — PROGRESS NOTES
light.   Neck:      Trachea: Trachea and phonation normal.   Cardiovascular:      Rate and Rhythm: Normal rate and regular rhythm.      Heart sounds: Normal heart sounds.   Pulmonary:      Effort: Pulmonary effort is normal. No tachypnea or respiratory distress.      Breath sounds: Normal breath sounds. No stridor. No wheezing, rhonchi or rales.   Chest:      Chest wall: No tenderness.   Musculoskeletal:      Cervical back: Full passive range of motion without pain, normal range of motion and neck supple. No rigidity or tenderness. No pain with movement or muscular tenderness. Normal range of motion.   Lymphadenopathy:      Head:      Right side of head: No submental, submandibular, tonsillar, preauricular, posterior auricular or occipital adenopathy.      Left side of head: No submental, submandibular, tonsillar, preauricular, posterior auricular or occipital adenopathy.      Cervical: No cervical adenopathy.   Skin:     General: Skin is warm and dry.   Neurological:      General: No focal deficit present.      Mental Status: He is alert and oriented to person, place, and time.      Cranial Nerves: Cranial nerves 2-12 are intact. No dysarthria or facial asymmetry.      Sensory: Sensation is intact.      Motor: Motor function is intact. No weakness or tremor.      Coordination: Coordination is intact. Coordination normal.      Gait: Gait is intact.   Psychiatric:         Behavior: Behavior is cooperative.         PROCEDURES:  Unless otherwise noted below, none     Procedures    RESULTS:  Results for orders placed or performed in visit on 06/16/25   POCT Rapid Strep A DNA (Alere i)   Result Value Ref Range    Streptococcus A RNA neg          An electronic signature was used to authenticate this note.  --Lai Kramer PA-C      This chart was generated in part by using Dragon Dictation system and may contain errors related to that system including errors in grammar, punctuation, and spelling, as well as words

## 2025-06-26 ENCOUNTER — PATIENT MESSAGE (OUTPATIENT)
Dept: FAMILY MEDICINE CLINIC | Age: 35
End: 2025-06-26

## 2025-07-15 ENCOUNTER — OFFICE VISIT (OUTPATIENT)
Dept: FAMILY MEDICINE CLINIC | Age: 35
End: 2025-07-15
Payer: COMMERCIAL

## 2025-07-15 VITALS
OXYGEN SATURATION: 97 % | WEIGHT: 227 LBS | HEART RATE: 83 BPM | RESPIRATION RATE: 17 BRPM | BODY MASS INDEX: 31.67 KG/M2 | DIASTOLIC BLOOD PRESSURE: 112 MMHG | SYSTOLIC BLOOD PRESSURE: 169 MMHG | TEMPERATURE: 97.6 F

## 2025-07-15 DIAGNOSIS — I16.0 HYPERTENSIVE URGENCY: Primary | ICD-10-CM

## 2025-07-15 DIAGNOSIS — I10 ESSENTIAL HYPERTENSION: ICD-10-CM

## 2025-07-15 PROCEDURE — 3077F SYST BP >= 140 MM HG: CPT | Performed by: SURGERY

## 2025-07-15 PROCEDURE — G2211 COMPLEX E/M VISIT ADD ON: HCPCS | Performed by: SURGERY

## 2025-07-15 PROCEDURE — G8427 DOCREV CUR MEDS BY ELIG CLIN: HCPCS | Performed by: SURGERY

## 2025-07-15 PROCEDURE — G8417 CALC BMI ABV UP PARAM F/U: HCPCS | Performed by: SURGERY

## 2025-07-15 PROCEDURE — 3080F DIAST BP >= 90 MM HG: CPT | Performed by: SURGERY

## 2025-07-15 PROCEDURE — 99214 OFFICE O/P EST MOD 30 MIN: CPT | Performed by: SURGERY

## 2025-07-15 PROCEDURE — 1036F TOBACCO NON-USER: CPT | Performed by: SURGERY

## 2025-07-15 RX ORDER — CLONIDINE HYDROCHLORIDE 0.2 MG/1
0.2 TABLET ORAL 2 TIMES DAILY
Qty: 60 TABLET | Refills: 3 | Status: SHIPPED | OUTPATIENT
Start: 2025-07-15

## 2025-07-15 RX ORDER — VALSARTAN 160 MG/1
160 TABLET ORAL DAILY
Qty: 90 TABLET | Refills: 1 | Status: SHIPPED | OUTPATIENT
Start: 2025-07-15

## 2025-07-17 ENCOUNTER — PATIENT MESSAGE (OUTPATIENT)
Dept: FAMILY MEDICINE CLINIC | Age: 35
End: 2025-07-17

## 2025-07-17 DIAGNOSIS — I10 PRIMARY HYPERTENSION: Primary | ICD-10-CM

## 2025-07-17 RX ORDER — NEBULIZER AND COMPRESSOR
1 EACH MISCELLANEOUS DAILY
Qty: 1 KIT | Refills: 0 | Status: SHIPPED | OUTPATIENT
Start: 2025-07-17

## 2025-07-25 ENCOUNTER — OFFICE VISIT (OUTPATIENT)
Dept: FAMILY MEDICINE CLINIC | Age: 35
End: 2025-07-25
Payer: COMMERCIAL

## 2025-07-25 VITALS
TEMPERATURE: 97.8 F | HEART RATE: 87 BPM | BODY MASS INDEX: 32.04 KG/M2 | DIASTOLIC BLOOD PRESSURE: 88 MMHG | RESPIRATION RATE: 16 BRPM | SYSTOLIC BLOOD PRESSURE: 136 MMHG | OXYGEN SATURATION: 98 % | WEIGHT: 229.6 LBS

## 2025-07-25 DIAGNOSIS — F52.4 ACQUIRED GENERALIZED PREMATURE EJACULATION: ICD-10-CM

## 2025-07-25 DIAGNOSIS — E78.5 HYPERLIPIDEMIA, UNSPECIFIED HYPERLIPIDEMIA TYPE: ICD-10-CM

## 2025-07-25 DIAGNOSIS — F41.1 GENERALIZED ANXIETY DISORDER: Primary | ICD-10-CM

## 2025-07-25 LAB
BILIRUBIN, POC: NEGATIVE
BLOOD URINE, POC: NEGATIVE
CLARITY, POC: NORMAL
COLOR, POC: NORMAL
GLUCOSE URINE, POC: NEGATIVE MG/DL
KETONES, POC: NEGATIVE MG/DL
LEUKOCYTE EST, POC: NEGATIVE
NITRITE, POC: NEGATIVE
PH, POC: 5.5
PROTEIN, POC: NEGATIVE MG/DL
SPECIFIC GRAVITY, POC: >=1.03
UROBILINOGEN, POC: 0.2 MG/DL

## 2025-07-25 PROCEDURE — G2211 COMPLEX E/M VISIT ADD ON: HCPCS | Performed by: SURGERY

## 2025-07-25 PROCEDURE — 81002 URINALYSIS NONAUTO W/O SCOPE: CPT | Performed by: SURGERY

## 2025-07-25 PROCEDURE — G8417 CALC BMI ABV UP PARAM F/U: HCPCS | Performed by: SURGERY

## 2025-07-25 PROCEDURE — 3075F SYST BP GE 130 - 139MM HG: CPT | Performed by: SURGERY

## 2025-07-25 PROCEDURE — 99214 OFFICE O/P EST MOD 30 MIN: CPT | Performed by: SURGERY

## 2025-07-25 PROCEDURE — 3079F DIAST BP 80-89 MM HG: CPT | Performed by: SURGERY

## 2025-07-25 PROCEDURE — 1036F TOBACCO NON-USER: CPT | Performed by: SURGERY

## 2025-07-25 PROCEDURE — G8427 DOCREV CUR MEDS BY ELIG CLIN: HCPCS | Performed by: SURGERY

## 2025-07-25 RX ORDER — ESCITALOPRAM OXALATE 10 MG/1
10 TABLET ORAL DAILY
Qty: 30 TABLET | Refills: 0 | Status: SHIPPED | OUTPATIENT
Start: 2025-07-25

## 2025-07-25 NOTE — PROGRESS NOTES
7/25/2025    History of Present Illness  The patient presents for evaluation of blood pressure, premature ejaculation, anxiety, and stress.    He has been monitoring his blood pressure at home using a cuff he purchased from [employer]. His readings have been inconsistent, with systolic values ranging from 150s to 100s and diastolic values around 90. He notes that his blood pressure sometimes remains elevated even after taking clonidine, but on one occasion, it dropped to 120/50 post-medication. He took his last dose of clonidine at 7:00 AM today and believes it may not have fully taken effect yet.    He reports experiencing significant anxiety and stress. He often feels nervous, anxious, or on edge, but does not report excessive worrying. He struggles with relaxation and frequently feels restless, irritable, and uneasy. He has previously tried Klonopin and hydroxyzine for his anxiety. He also tried BuSpar, which provided relief for about 30 minutes. He is open to trying Lexapro, but expresses reluctance towards antipsychotics due to negative experiences with them in the past. He was previously prescribed amitriptyline for migraines during his youth, which he believes worsened his condition.    This is a 35 y.o. male   Chief Complaint   Patient presents with    Follow-up    Hypertension   .    Changed BP meds to valsartan and clonidine         Patient Active Problem List   Diagnosis    Essential hypertension    Intractable migraine without aura and without status migrainosus    Smoker    Wrist fracture, closed, left, initial encounter    Open wrist fracture, left, initial encounter    Gastroesophageal reflux disease    Ventral hernia    Refractory obstruction of nasal airway    Hypertrophy of both inferior nasal turbinates    Chronic maxillary sinusitis    Maylin bullosa    Chronic rhinitis    Chronic adenotonsillitis    Adenoid hypertrophy    Post-op pain    Nasal septal deviation       Current Outpatient Medications

## 2025-07-26 LAB
ALBUMIN SERPL-MCNC: 4.9 G/DL (ref 3.4–5)
ALBUMIN/GLOB SERPL: 2.2 {RATIO} (ref 1.1–2.2)
ALP SERPL-CCNC: 69 U/L (ref 40–129)
ALT SERPL-CCNC: 42 U/L (ref 10–40)
ANION GAP SERPL CALCULATED.3IONS-SCNC: 12 MMOL/L (ref 3–16)
AST SERPL-CCNC: 24 U/L (ref 15–37)
BILIRUB SERPL-MCNC: 0.4 MG/DL (ref 0–1)
BUN SERPL-MCNC: 16 MG/DL (ref 7–20)
CALCIUM SERPL-MCNC: 9.5 MG/DL (ref 8.3–10.6)
CHLORIDE SERPL-SCNC: 104 MMOL/L (ref 99–110)
CHOLEST SERPL-MCNC: 242 MG/DL (ref 0–199)
CO2 SERPL-SCNC: 25 MMOL/L (ref 21–32)
CREAT SERPL-MCNC: 1.1 MG/DL (ref 0.9–1.3)
GFR SERPLBLD CREATININE-BSD FMLA CKD-EPI: >90 ML/MIN/{1.73_M2}
GLUCOSE SERPL-MCNC: 101 MG/DL (ref 70–99)
HDLC SERPL-MCNC: 30 MG/DL (ref 40–60)
LDLC SERPL CALC-MCNC: 161 MG/DL
POTASSIUM SERPL-SCNC: 4.8 MMOL/L (ref 3.5–5.1)
PROT SERPL-MCNC: 7.1 G/DL (ref 6.4–8.2)
SODIUM SERPL-SCNC: 141 MMOL/L (ref 136–145)
TRIGL SERPL-MCNC: 257 MG/DL (ref 0–150)
TSH SERPL DL<=0.005 MIU/L-ACNC: 1.26 UIU/ML (ref 0.27–4.2)
VLDLC SERPL CALC-MCNC: 51 MG/DL

## 2025-08-01 NOTE — PROGRESS NOTES
7/15/2025    History of Present Illness  The patient presents for evaluation of elevated blood pressure.    He has been experiencing elevated blood pressure, which he believes is not being effectively managed by his current medication. He monitors his blood pressure at home intermittently. During a dental visit approximately 2.5 weeks ago, his blood pressure was recorded as 170 systolic while on his blood pressure medication. He reports feeling lightheaded, experiencing headaches, and occasional nausea. His vision becomes blurry when he opens his eyes but refocuses after a short period. He has not had any recent illnesses. He has reduced his use of medical marijuana. He has previously tried lisinopril and clonidine for his blood pressure, both of which were beneficial.    He has been having ejaculation issues for a while.    This is a 35 y.o. male   Chief Complaint   Patient presents with    Other     Premature ejaculation    Headache     Lightheaded since this morning. 179/120 at dentist this morning    .    Headache       Patient Active Problem List   Diagnosis    Essential hypertension    Intractable migraine without aura and without status migrainosus    Smoker    Wrist fracture, closed, left, initial encounter    Open wrist fracture, left, initial encounter    Gastroesophageal reflux disease    Ventral hernia    Refractory obstruction of nasal airway    Hypertrophy of both inferior nasal turbinates    Chronic maxillary sinusitis    Maylin bullosa    Chronic rhinitis    Chronic adenotonsillitis    Adenoid hypertrophy    Post-op pain    Nasal septal deviation       Current Outpatient Medications   Medication Sig Dispense Refill    cloNIDine (CATAPRES) 0.2 MG tablet Take 1 tablet by mouth 2 times daily 60 tablet 3    valsartan (DIOVAN) 160 MG tablet Take 1 tablet by mouth daily 90 tablet 1    omeprazole (PRILOSEC) 40 MG delayed release capsule Take 1 capsule by mouth 2 times daily      SUMAtriptan (IMITREX) 50 MG

## 2025-08-14 DIAGNOSIS — F41.1 GENERALIZED ANXIETY DISORDER: ICD-10-CM

## 2025-08-14 RX ORDER — ESCITALOPRAM OXALATE 10 MG/1
10 TABLET ORAL DAILY
Qty: 90 TABLET | Refills: 0 | Status: SHIPPED | OUTPATIENT
Start: 2025-08-14

## 2025-08-26 ENCOUNTER — OFFICE VISIT (OUTPATIENT)
Dept: FAMILY MEDICINE CLINIC | Age: 35
End: 2025-08-26
Payer: COMMERCIAL

## 2025-08-26 VITALS
RESPIRATION RATE: 15 BRPM | WEIGHT: 235.8 LBS | DIASTOLIC BLOOD PRESSURE: 88 MMHG | OXYGEN SATURATION: 96 % | HEART RATE: 86 BPM | SYSTOLIC BLOOD PRESSURE: 136 MMHG | TEMPERATURE: 97.2 F | BODY MASS INDEX: 32.9 KG/M2

## 2025-08-26 DIAGNOSIS — F41.1 GENERALIZED ANXIETY DISORDER: Primary | ICD-10-CM

## 2025-08-26 PROCEDURE — 3075F SYST BP GE 130 - 139MM HG: CPT | Performed by: SURGERY

## 2025-08-26 PROCEDURE — G8417 CALC BMI ABV UP PARAM F/U: HCPCS | Performed by: SURGERY

## 2025-08-26 PROCEDURE — 1036F TOBACCO NON-USER: CPT | Performed by: SURGERY

## 2025-08-26 PROCEDURE — 3079F DIAST BP 80-89 MM HG: CPT | Performed by: SURGERY

## 2025-08-26 PROCEDURE — G8427 DOCREV CUR MEDS BY ELIG CLIN: HCPCS | Performed by: SURGERY

## 2025-08-26 PROCEDURE — 99214 OFFICE O/P EST MOD 30 MIN: CPT | Performed by: SURGERY

## 2025-08-26 RX ORDER — FAMOTIDINE 20 MG/1
TABLET, FILM COATED ORAL
COMMUNITY
Start: 2025-08-17

## 2025-08-26 RX ORDER — VENLAFAXINE HYDROCHLORIDE 37.5 MG/1
37.5 CAPSULE, EXTENDED RELEASE ORAL DAILY
Qty: 30 CAPSULE | Refills: 3 | Status: SHIPPED | OUTPATIENT
Start: 2025-08-26

## 2025-08-26 RX ORDER — DICYCLOMINE HCL 20 MG
TABLET ORAL
COMMUNITY
Start: 2025-07-25

## 2025-08-26 ASSESSMENT — PATIENT HEALTH QUESTIONNAIRE - PHQ9
SUM OF ALL RESPONSES TO PHQ QUESTIONS 1-9: 0
2. FEELING DOWN, DEPRESSED OR HOPELESS: NOT AT ALL
SUM OF ALL RESPONSES TO PHQ QUESTIONS 1-9: 0
SUM OF ALL RESPONSES TO PHQ QUESTIONS 1-9: 0
1. LITTLE INTEREST OR PLEASURE IN DOING THINGS: NOT AT ALL
SUM OF ALL RESPONSES TO PHQ QUESTIONS 1-9: 0

## 2025-09-02 ENCOUNTER — TELEPHONE (OUTPATIENT)
Dept: FAMILY MEDICINE CLINIC | Age: 35
End: 2025-09-02

## (undated) DEVICE — FORCEPS BX L240CM JAW DIA2.8MM L CAP W/ NDL MIC MESH TOOTH

## (undated) DEVICE — TRAY,IRRIGATION,BULBSYRINGE,60ML,CSR,PVP: Brand: MEDLINE

## (undated) DEVICE — GAUZE,SPONGE,4"X4",16PLY,XRAY,STRL,LF: Brand: MEDLINE

## (undated) DEVICE — 3M™ STERI-DRAPE™ INSTRUMENT POUCH 1018: Brand: STERI-DRAPE™

## (undated) DEVICE — SOLUTION IRRIG 1000ML 09% SOD CHL USP PIC PLAS CONTAINER

## (undated) DEVICE — ELECTRODE NDL L2.8IN COAT LO PWR SET EDGE

## (undated) DEVICE — 4-PORT MANIFOLD: Brand: NEPTUNE 2

## (undated) DEVICE — SUTURE PDS II + SZ 5-0 L30IN ABSRB VLT RB-2 L13MM 1/2 CIR PDP148H

## (undated) DEVICE — TOWEL,OR,DSP,ST,BLUE,STD,4/PK,20PK/CS: Brand: MEDLINE

## (undated) DEVICE — GLOVE ORTHO 7 1/2   MSG9475

## (undated) DEVICE — BLADE 1884080EM TRICUT 4MMX13CM M4 ROHS: Brand: FUSION®

## (undated) DEVICE — BLADE OPHTH 180DEG CUT SURF BLU STR SHRP DBL BVL GRINDLESS

## (undated) DEVICE — SYRINGE IRRIG 60ML SFT PLIABLE BLB EZ TO GRP 1 HND USE W/

## (undated) DEVICE — SYRINGE MED 30ML STD CLR PLAS LUERLOCK TIP N CTRL DISP

## (undated) DEVICE — COVER LT HNDL BLU PLAS

## (undated) DEVICE — TRANSFER SET 3": Brand: MEDLINE INDUSTRIES, INC.

## (undated) DEVICE — 3M™ IOBAN™ 2 ANTIMICROBIAL INCISE DRAPE 6640EZ: Brand: IOBAN™ 2

## (undated) DEVICE — PADDING CAST W4INXL4YD HIGHLY ABSRB THAN COT EZ APPL

## (undated) DEVICE — DRILL BIT, AO, SCALED: Brand: VARIAX

## (undated) DEVICE — TOWEL,STOP FLAG GOLD N-W: Brand: MEDLINE

## (undated) DEVICE — GOWN SIRUS NONREIN XL W/TWL: Brand: MEDLINE INDUSTRIES, INC.

## (undated) DEVICE — C-ARM PACK: Brand: C-ARM COVER

## (undated) DEVICE — GARMENT,MEDLINE,DVT,INT,CALF,MED, GEN2: Brand: MEDLINE

## (undated) DEVICE — CATHETER IV 18 GAX125 IN WNG INTROCAN SAFETY

## (undated) DEVICE — COVER,TABLE,HEAVY DUTY,77"X90",STRL: Brand: MEDLINE

## (undated) DEVICE — MERCY HEALTH WEST TURNOVER: Brand: MEDLINE INDUSTRIES, INC.

## (undated) DEVICE — PENROSE DRAIN 12" X 1/2": Brand: CARDINAL HEALTH

## (undated) DEVICE — PADDING UNDERCAST W4INXL4YD 100% COT CRIMPED FINISH WBRL II

## (undated) DEVICE — BNDG,ELSTC,MATRIX,STRL,4"X5YD,LF,HOOK&LP: Brand: MEDLINE

## (undated) DEVICE — SPLINT 1524075 DOYLE BIVALVE SET C-FLEX: Brand: C-FLEX®

## (undated) DEVICE — SYRINGE MED 10ML LUERLOCK TIP W/O SFTY DISP

## (undated) DEVICE — BASIC SINGLE BASIN 1-LF: Brand: MEDLINE INDUSTRIES, INC.

## (undated) DEVICE — CONMED SCOPE SAVER BITE BLOCK, 20X27 MM: Brand: SCOPE SAVER

## (undated) DEVICE — SUTURE VCRL + SZ 3-0 L18IN ABSRB UD SH 1/2 CIR TAPERCUT NDL VCP864D

## (undated) DEVICE — SUTURE ABSORBABLE MONOFILAMENT 4-0 SC-1 18 IN PLN GUT 1824H

## (undated) DEVICE — BANDAGE COMPR W1INXL5YD BGE E ADH TENSOPLAST

## (undated) DEVICE — INSTRUMENT TRACKER 9733533XOM ENT 1PK

## (undated) DEVICE — STANDARD HYPODERMIC NEEDLE,POLYPROPYLENE HUB: Brand: MONOJECT

## (undated) DEVICE — SPLINT PLSTR OF PARIS W4XL15IN EXTRA FAST SET GYPS S

## (undated) DEVICE — PADDING CAST CRIMPED FINISH STD 4 YDX3 IN COTTON WBRL II

## (undated) DEVICE — SUTURE ETHILON SZ 5-0 L18IN NONABSORBABLE BLK L19MM PS-2 3/8 1666G

## (undated) DEVICE — SUTURE FIBERWIRE SZ 2-0 L18IN BLU L26.5MM 1/2 CIR TAPR NDL AR7242

## (undated) DEVICE — BONE SCREW, FULLY THREADED, T8
Type: IMPLANTABLE DEVICE | Site: ARM | Status: NON-FUNCTIONAL
Brand: VARIAX

## (undated) DEVICE — SURGICAL SUCTION CONNECTING TUBE WITH MALE CONNECTOR AND SUCTION CLAMP, 2 FT. LONG (.6 M), 5 MM I.D.: Brand: CONMED

## (undated) DEVICE — TUBING, SUCTION, 1/4" X 12', STRAIGHT: Brand: MEDLINE

## (undated) DEVICE — WRAP COMPR W2INXL5YD TAN SELF ADH COBAN

## (undated) DEVICE — UNDERGLOVE SURG SZ 8 BLU LTX FREE SYN POLYISOPRENE POLYMER

## (undated) DEVICE — GLOVE SURG SZ 8 L12IN FNGR THK79MIL GRN LTX FREE

## (undated) DEVICE — COVER PIN SM YEL ARTC FIX CORETRAK SIDEKCK

## (undated) DEVICE — SYRINGE MED 50ML LUERLOCK TIP

## (undated) DEVICE — BANDAGE,GAUZE,CONFORMING,3"X75",STRL,LF: Brand: MEDLINE

## (undated) DEVICE — GOWN,SIRUS,POLYRNF,BRTHSLV,XLN/XL,20/CS: Brand: MEDLINE

## (undated) DEVICE — ENDOSCOPIC KIT 2 12 FT OP4 DE2 GWN SYR

## (undated) DEVICE — NEPTUNE E-SEP SMOKE EVACUATION PENCIL, COATED, 70MM BLADE, PUSH BUTTON SWITCH: Brand: NEPTUNE E-SEP

## (undated) DEVICE — SUTURE PDS + SZ 4 0 L27IN ABSRB VLT L17MM RB 1 1 2 CIR PDP304H

## (undated) DEVICE — HAND PK

## (undated) DEVICE — SUTURE NONABSORBABLE MONOFILAMENT 5-0 C-1 1X24 IN PROLENE 8725H

## (undated) DEVICE — ELECTRODE PT RET AD L9FT HI MOIST COND ADH HYDRGEL CORDED

## (undated) DEVICE — BONE SCREW
Type: IMPLANTABLE DEVICE | Site: ARM | Status: NON-FUNCTIONAL
Brand: VARIAX

## (undated) DEVICE — SUTURE FIBERWIRE SZ 2 W/ TAPERED NEEDLE BLUE L38IN NONABSORB BLU L26.5MM 1/2 CIRCLE AR7200

## (undated) DEVICE — TUBING 1895522 5PK STRAIGHTSHOT TO XPS: Brand: STRAIGHTSHOT®

## (undated) DEVICE — DRESSING PETRO W3XL8IN N ADH KNIT CELOS ACETT ADPTC

## (undated) DEVICE — LOCKING SCREW, FULLY THREADED,T8
Type: IMPLANTABLE DEVICE | Site: ARM | Status: NON-FUNCTIONAL
Brand: VARIAX

## (undated) DEVICE — SHEET,DRAPE,53X77,STERILE: Brand: MEDLINE

## (undated) DEVICE — CATHETER URETH 12FR L16IN RED RUB INTMIT ROB MOD BARDX

## (undated) DEVICE — SLING ARM L L17 1/2IN D8.5IN COT POLY DLX W/ SHLDR PD

## (undated) DEVICE — SOLUTION IV 1000ML 0.9% SOD CHL

## (undated) DEVICE — CAUTERY ES L0.5IN FN TIP HI TEMP ACCU-TEMP

## (undated) DEVICE — KIT,ANTI FOG,W/SPONGE & FLUID,SOFT PACK: Brand: MEDLINE

## (undated) DEVICE — SPLINT NSL W0.6XL2IN INTNSL CHITOSAN POLYMER HYDRATED

## (undated) DEVICE — SPONGE,NEURO,0.5"X3",XR,STRL,LF,10/PK: Brand: MEDLINE

## (undated) DEVICE — DRILL, AO, SCALED: Brand: VARIAX

## (undated) DEVICE — BANDAGE COMPR M W4INXL10YD WHT BGE VELC E MTRX HK AND LOOP

## (undated) DEVICE — BLADE,CARBON-STEEL,15,STRL,DISPOSABLE,TB: Brand: MEDLINE

## (undated) DEVICE — BLADE ES ELASTOMERIC COAT INSUL DURABLE BEND UPTO 90DEG

## (undated) DEVICE — HAND: Brand: MEDLINE INDUSTRIES, INC.

## (undated) DEVICE — DRAPE THER FLUID WARMING 66X44 IN FLAT SLUSH DBL DISC ORS

## (undated) DEVICE — BLADE 1882940 5PK INFERIOR TURB 2.9MM

## (undated) DEVICE — PATIENT TRACKER 9734887XOM NON-INVASIVE

## (undated) DEVICE — BLADE 1882916 RAD60 3PK SINUS 2.9MM: Brand: RAD®

## (undated) DEVICE — TONSIL SPONGES: Brand: DEROYAL

## (undated) DEVICE — SUTURE PROL SZ 6-0 L18IN NONABSORBABLE BLU L13MM C-1 3/8 8718H

## (undated) DEVICE — Z DISCONTINUED USE 2881376 SUTURE ETHILON SZ 4-0 L18IN NONABSORBABLE BLK L19MM PS-2 3/8 1667H

## (undated) DEVICE — ELECTRODE,ECG,STRESS,FOAM,3PK: Brand: MEDLINE

## (undated) DEVICE — CONTAINER,SPECIMEN,PNEU TUBE,3OZ,OR STRL: Brand: MEDLINE

## (undated) DEVICE — DEVICE CRYOTHERAPY W/ CRYO CANSTR CLARIFIX GEN II

## (undated) DEVICE — CANNULA NSL AD TBNG L7FT PVC STR NONFLARED PRNG O2 DEL W STD

## (undated) DEVICE — ENT: Brand: MEDLINE INDUSTRIES, INC.

## (undated) DEVICE — UNTHREADED GUIDE WIRE: Brand: FIXOS